# Patient Record
Sex: FEMALE | Race: WHITE | NOT HISPANIC OR LATINO | Employment: UNEMPLOYED | URBAN - METROPOLITAN AREA
[De-identification: names, ages, dates, MRNs, and addresses within clinical notes are randomized per-mention and may not be internally consistent; named-entity substitution may affect disease eponyms.]

---

## 2020-12-10 ENCOUNTER — TELEPHONE (OUTPATIENT)
Dept: OBSTETRICS AND GYNECOLOGY | Facility: CLINIC | Age: 40
End: 2020-12-10

## 2020-12-10 DIAGNOSIS — R10.32 ABDOMINAL PAIN, LLQ: Primary | ICD-10-CM

## 2020-12-10 NOTE — TELEPHONE ENCOUNTER
----- Message from Crista Robison sent at 12/10/2020  4:24 PM CST -----  Contact: pt  Type:  Sooner Apoointment Request    Caller is requesting a sooner appointment.  Caller declined first available appointment listed below.  Caller will not accept being placed on the waitlist and is requesting a message be sent to doctor.  Name of Caller:Margareth  When is the first available appointment?  Symptoms:pelvic pain  Would the patient rather a call back or a response via [facility]? call  Best Call Back Number:[phone]  Additional Information:

## 2020-12-11 ENCOUNTER — OFFICE VISIT (OUTPATIENT)
Dept: OBSTETRICS AND GYNECOLOGY | Facility: CLINIC | Age: 40
End: 2020-12-11
Payer: COMMERCIAL

## 2020-12-11 VITALS
BODY MASS INDEX: 23.56 KG/M2 | DIASTOLIC BLOOD PRESSURE: 60 MMHG | HEART RATE: 60 BPM | WEIGHT: 138 LBS | HEIGHT: 64 IN | SYSTOLIC BLOOD PRESSURE: 110 MMHG

## 2020-12-11 DIAGNOSIS — R10.31 ABDOMINAL PAIN, RLQ: ICD-10-CM

## 2020-12-11 DIAGNOSIS — N95.1 MENOPAUSAL SYMPTOMS: ICD-10-CM

## 2020-12-11 DIAGNOSIS — Z00.00 LABORATORY EXAM ORDERED AS PART OF ROUTINE GENERAL MEDICAL EXAMINATION: ICD-10-CM

## 2020-12-11 DIAGNOSIS — R39.89 BLADDER PAIN: Primary | ICD-10-CM

## 2020-12-11 LAB
AMORPH URATE CRY URNS QL MICRO: ABNORMAL
BACTERIA #/AREA URNS HPF: ABNORMAL /[HPF]
BILIRUB UR QL STRIP: NEGATIVE
CLARITY UR: CLEAR
COLOR UR: YELLOW
EPITHELIAL CELLS: ABNORMAL
GLUCOSE (UA): NEGATIVE MG/DL
KETONES UR QL STRIP: NEGATIVE MG/DL
LEUKOCYTE ESTERASE UR QL STRIP: NEGATIVE
MUCOUS THREADS URNS QL MICRO: NEGATIVE
NITRITE UR QL STRIP: NEGATIVE
OCCULT BLOOD: NEGATIVE
PH, URINE: 8 (ref 5–7.5)
PROT UR QL STRIP: NEGATIVE MG/DL
RBC/HPF: NEGATIVE
SP GR UR STRIP: 1.01 (ref 1–1.03)
UROBILINOGEN, URINE: NORMAL E.U./DL (ref 0–1)
WBC/HPF: NEGATIVE

## 2020-12-11 PROCEDURE — 99214 PR OFFICE/OUTPT VISIT, EST, LEVL IV, 30-39 MIN: ICD-10-PCS | Mod: 25,S$GLB,, | Performed by: OBSTETRICS & GYNECOLOGY

## 2020-12-11 PROCEDURE — 81002 PR URINALYSIS NONAUTO W/O SCOPE: ICD-10-PCS | Mod: S$GLB,,, | Performed by: OBSTETRICS & GYNECOLOGY

## 2020-12-11 PROCEDURE — 1125F PR PAIN SEVERITY QUANTIFIED, PAIN PRESENT: ICD-10-PCS | Mod: S$GLB,,, | Performed by: OBSTETRICS & GYNECOLOGY

## 2020-12-11 PROCEDURE — 81002 URINALYSIS NONAUTO W/O SCOPE: CPT | Mod: S$GLB,,, | Performed by: OBSTETRICS & GYNECOLOGY

## 2020-12-11 PROCEDURE — 3008F PR BODY MASS INDEX (BMI) DOCUMENTED: ICD-10-PCS | Mod: CPTII,S$GLB,, | Performed by: OBSTETRICS & GYNECOLOGY

## 2020-12-11 PROCEDURE — 3008F BODY MASS INDEX DOCD: CPT | Mod: CPTII,S$GLB,, | Performed by: OBSTETRICS & GYNECOLOGY

## 2020-12-11 PROCEDURE — 99214 OFFICE O/P EST MOD 30 MIN: CPT | Mod: 25,S$GLB,, | Performed by: OBSTETRICS & GYNECOLOGY

## 2020-12-11 PROCEDURE — 1125F AMNT PAIN NOTED PAIN PRSNT: CPT | Mod: S$GLB,,, | Performed by: OBSTETRICS & GYNECOLOGY

## 2020-12-11 RX ORDER — ALPRAZOLAM 0.5 MG/1
TABLET ORAL
COMMUNITY
Start: 2020-12-09 | End: 2022-02-22

## 2020-12-11 RX ORDER — CYCLOBENZAPRINE HCL 10 MG
TABLET ORAL
COMMUNITY
Start: 2020-10-30 | End: 2022-02-22

## 2020-12-11 RX ORDER — DULOXETIN HYDROCHLORIDE 60 MG/1
CAPSULE, DELAYED RELEASE ORAL
COMMUNITY
Start: 2020-10-30 | End: 2022-02-22

## 2020-12-11 RX ORDER — DOCUSATE SODIUM 100 MG/1
CAPSULE, LIQUID FILLED ORAL
COMMUNITY
End: 2022-02-22

## 2020-12-11 RX ORDER — HYDROCODONE BITARTRATE AND ACETAMINOPHEN 5; 325 MG/1; MG/1
1 TABLET ORAL EVERY 6 HOURS PRN
Qty: 20 TABLET | Refills: 0 | Status: SHIPPED | OUTPATIENT
Start: 2020-12-11 | End: 2022-02-22

## 2020-12-11 RX ORDER — NABUMETONE 500 MG/1
TABLET, FILM COATED ORAL
Qty: 30 TABLET | Refills: 2 | Status: SHIPPED | OUTPATIENT
Start: 2020-12-11 | End: 2022-02-22

## 2020-12-11 RX ORDER — ZOLPIDEM TARTRATE 5 MG/1
TABLET ORAL
COMMUNITY
Start: 2020-10-30 | End: 2022-02-22
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
## 2020-12-13 NOTE — PROGRESS NOTES
Please notify pt that her labwork only shows vaginal contamination but not evidence of bladder infection. Is she feeling any better with the Relafen and pain medication?

## 2020-12-14 ENCOUNTER — TELEPHONE (OUTPATIENT)
Dept: OBSTETRICS AND GYNECOLOGY | Facility: CLINIC | Age: 40
End: 2020-12-14

## 2020-12-14 DIAGNOSIS — R10.31 ABDOMINAL PAIN, RLQ: Primary | ICD-10-CM

## 2020-12-14 LAB
ABS NRBC COUNT: 0 X 10 3/UL (ref 0–0.01)
ABSOLUTE BASOPHIL: 0.05 X 10 3/UL (ref 0–0.22)
ABSOLUTE EOSINOPHIL: 0.09 X 10 3/UL (ref 0.04–0.54)
ABSOLUTE IMMATURE GRAN: 0.01 X 10 3/UL (ref 0–0.04)
ABSOLUTE LYMPHOCYTE: 1.07 X 10 3/UL (ref 0.86–4.75)
ABSOLUTE MONOCYTE: 0.39 X 10 3/UL (ref 0.22–1.08)
ALBUMIN SERPL-MCNC: 4.7 G/DL (ref 3.5–5.2)
ALBUMIN/GLOB SERPL ELPH: 2.2 {RATIO} (ref 1–2.7)
ALP ISOS SERPL LEV INH-CCNC: 26 U/L (ref 35–105)
ALT (SGPT): 14 U/L (ref 0–33)
ANION GAP SERPL CALC-SCNC: 8 MMOL/L (ref 8–17)
AST SERPL-CCNC: 19 U/L (ref 0–32)
BASOPHILS NFR BLD: 1 % (ref 0.2–1.2)
BILIRUBIN, TOTAL: 0.72 MG/DL (ref 0–1.2)
BUN/CREAT SERPL: 20.5 (ref 6–20)
CALCIUM SERPL-MCNC: 9 MG/DL (ref 8.6–10.2)
CARBON DIOXIDE, CO2: 26 MMOL/L (ref 22–29)
CHLORIDE: 107 MMOL/L (ref 98–107)
CHOLEST SERPL-MSCNC: 135 MG/DL (ref 100–200)
CREAT SERPL-MCNC: 0.79 MG/DL (ref 0.5–0.9)
EOSINOPHIL NFR BLD: 1.8 % (ref 0.7–7)
FSH: 3.77 MIU/ML
GFR ESTIMATION: 80.61
GLOBULIN: 2.1 G/DL (ref 1.5–4.5)
GLUCOSE: 83 MG/DL (ref 74–106)
HCT VFR BLD AUTO: 39.2 % (ref 37–47)
HDLC SERPL-MCNC: 63 MG/DL
HGB BLD-MCNC: 13 G/DL (ref 12–16)
IMMATURE GRANULOCYTES: 0.2 % (ref 0–0.5)
LDL/HDL RATIO: 1 (ref 1–3)
LDLC SERPL CALC-MCNC: 61.2 MG/DL (ref 0–100)
LH: 2.39 MIU/ML
LYMPHOCYTES NFR BLD: 21.4 % (ref 19.3–53.1)
MCH RBC QN AUTO: 32.1 PG (ref 27–32)
MCHC RBC AUTO-ENTMCNC: 33.2 G/DL (ref 32–36)
MCV RBC AUTO: 96.8 FL (ref 82–100)
MONOCYTES NFR BLD: 7.8 % (ref 4.7–12.5)
NEUTROPHILS # BLD AUTO: 3.4 X 10 3/UL (ref 2.15–7.56)
NEUTROPHILS NFR BLD: 67.8 %
NUCLEATED RED BLOOD CELLS: 0 /100 WBC (ref 0–0.2)
PLATELET # BLD AUTO: 243 X 10 3/UL (ref 135–400)
POTASSIUM: 4.4 MMOL/L (ref 3.5–5.1)
PROT SNV-MCNC: 6.8 G/DL (ref 6.4–8.3)
RBC # BLD AUTO: 4.05 X 10 6/UL (ref 4.2–5.4)
RDW-SD: 42.3 FL (ref 37–54)
SODIUM: 141 MMOL/L (ref 136–145)
T4, FREE: 1.28 NG/DL (ref 0.93–1.7)
TRIGL SERPL-MCNC: 54 MG/DL (ref 0–150)
TSH SERPL DL<=0.005 MIU/L-ACNC: 1.65 UIU/ML (ref 0.27–4.2)
UREA NITROGEN (BUN): 16.2 MG/DL (ref 6–20)
WBC # BLD: 5.01 X 10 3/UL (ref 4.3–10.8)

## 2020-12-14 NOTE — TELEPHONE ENCOUNTER
----- Message from Michelle Torres sent at 12/14/2020  2:51 PM CST -----  Contact: self  Requesting call back regarding pt states she's still in pain and hurting and wants to know if she can get an ultrasound. Also pt states the hydrocodone keeps her up at night and she cant rest. Please call back at [phone].

## 2020-12-15 ENCOUNTER — TELEPHONE (OUTPATIENT)
Dept: OBSTETRICS AND GYNECOLOGY | Facility: CLINIC | Age: 40
End: 2020-12-15

## 2020-12-15 ENCOUNTER — PROCEDURE VISIT (OUTPATIENT)
Dept: OBSTETRICS AND GYNECOLOGY | Facility: CLINIC | Age: 40
End: 2020-12-15
Payer: COMMERCIAL

## 2020-12-15 DIAGNOSIS — R10.32 ABDOMINAL PAIN, LLQ: ICD-10-CM

## 2020-12-15 PROCEDURE — 76830 PR  ECHOGRAPHY,TRANSVAGINAL: ICD-10-PCS | Mod: S$GLB,,, | Performed by: OBSTETRICS & GYNECOLOGY

## 2020-12-15 PROCEDURE — 76830 TRANSVAGINAL US NON-OB: CPT | Mod: S$GLB,,, | Performed by: OBSTETRICS & GYNECOLOGY

## 2020-12-15 NOTE — TELEPHONE ENCOUNTER
----- Message from Elsie Garvey MD sent at 12/14/2020  6:14 PM CST -----  Please notify patient of normal lab results. Hormone levels do not reflect menopause.

## 2020-12-15 NOTE — TELEPHONE ENCOUNTER
Spoke with patient. Two pt identifiers confirmed. Notified patient of results. Patient verbalized understanding.     Pt is asking for something different to be sent out for pain since the Norco keeps her up at night. Pt is aware that Dr Garvey is out of the office today and we'll call her back tomorrow regarding this.    ROSAS Corona

## 2020-12-16 DIAGNOSIS — R10.31 ABDOMINAL PAIN, RLQ: Primary | ICD-10-CM

## 2020-12-16 RX ORDER — TRAMADOL HYDROCHLORIDE 50 MG/1
50 TABLET ORAL EVERY 6 HOURS PRN
Qty: 20 TABLET | Refills: 0 | Status: SHIPPED | OUTPATIENT
Start: 2020-12-16 | End: 2021-12-16

## 2020-12-17 ENCOUNTER — TELEPHONE (OUTPATIENT)
Dept: OBSTETRICS AND GYNECOLOGY | Facility: CLINIC | Age: 40
End: 2020-12-17

## 2020-12-17 NOTE — TELEPHONE ENCOUNTER
----- Message from Elsie Garvey MD sent at 12/16/2020  6:05 PM CST -----  Please notify patient of normal ultrasound result and prescription sent

## 2020-12-22 ENCOUNTER — OFFICE VISIT (OUTPATIENT)
Dept: UROLOGY | Facility: CLINIC | Age: 40
End: 2020-12-22
Payer: COMMERCIAL

## 2020-12-22 VITALS
BODY MASS INDEX: 23.56 KG/M2 | RESPIRATION RATE: 18 BRPM | SYSTOLIC BLOOD PRESSURE: 130 MMHG | HEART RATE: 78 BPM | WEIGHT: 138 LBS | DIASTOLIC BLOOD PRESSURE: 76 MMHG | HEIGHT: 64 IN

## 2020-12-22 DIAGNOSIS — R35.0 URINARY FREQUENCY: ICD-10-CM

## 2020-12-22 DIAGNOSIS — R39.15 URINARY URGENCY: ICD-10-CM

## 2020-12-22 DIAGNOSIS — R39.89 BLADDER PAIN: Primary | ICD-10-CM

## 2020-12-22 LAB
CALCIUM BLD-MCNC: POSITIVE MG/DL
COV PREGNANCY STATUS: NORMAL
COVID-19 AB, IGM: NEGATIVE
PATIENT EMPLOYED IN HEALTHCARE: NO
PATIENT HAS SYMPTOMS FOR CONDITION OF INTEREST: NO
PATIENT HOSPITALIZED BC COND: NO
PATIENT IN CONGREGATE CARE: NO

## 2020-12-22 PROCEDURE — 3008F BODY MASS INDEX DOCD: CPT | Mod: CPTII,S$GLB,, | Performed by: NURSE PRACTITIONER

## 2020-12-22 PROCEDURE — 3008F PR BODY MASS INDEX (BMI) DOCUMENTED: ICD-10-PCS | Mod: CPTII,S$GLB,, | Performed by: NURSE PRACTITIONER

## 2020-12-22 PROCEDURE — 99204 PR OFFICE/OUTPT VISIT, NEW, LEVL IV, 45-59 MIN: ICD-10-PCS | Mod: S$GLB,,, | Performed by: NURSE PRACTITIONER

## 2020-12-22 PROCEDURE — 99204 OFFICE O/P NEW MOD 45 MIN: CPT | Mod: S$GLB,,, | Performed by: NURSE PRACTITIONER

## 2020-12-22 NOTE — PROGRESS NOTES
Subjective:       Patient ID: Margareth Echeverria is a 40 y.o. female.    Chief Complaint: Bladder Pain (New pt to est for constant bladder pain)      HPI: 40-year-old female, new patient, referred for bladder pain.  Patient states for approximately 1 month she has been having bladder pain/pressure and pelvic pain/pressure.  Patient denies pain or burning urination.  However patient states she does have urinary frequency and urgency.  States at times he will have decreased output.  Patient states her pain can be severe at times.  Patient states he will have pain with intercourse.  Patient states pain wakes her up at night.  Patient does have occasional leakage with cough or sneezing.  Patient has undergone a CT of the abdomen and pelvis which showed no acute abdominal abnormality.  Patient is ready had a pelvic exam which a she states is normal.  Patient also states she had a EGD which was normal.  Patient does have a history of irritable bowel.  She also has a history of endometriosis.  Patient denies any changes with food or stress.  No other urinary complaints.  All other health problems appear stable at this time.       Past Medical History:   Past Medical History:   Diagnosis Date    AV block     Fibromyalgia     IBS (irritable bowel syndrome)     Trigeminal neuralgia        Past Surgical Historical:   Past Surgical History:   Procedure Laterality Date    APPENDECTOMY      AUGMENTATION OF BREAST      x 2    CHOLECYSTECTOMY      FULGURATION OF ENDOMETRIOSIS  07/05/2016    chromopertubation    FULGURATION OF ENDOMETRIOSIS  10/05/2018    right ovarian cyst drainage and bilateral salpingectomy    INGUINAL HERNIA REPAIR Bilateral     LAPAROSCOPIC CHOLECYSTECTOMY      LAPAROSCOPY  12/08/2019    with exam under anesthesia    LAPAROSCOPY-ASSISTED VAGINAL HYSTERECTOMY  11/11/2019    with right oophorectomy        Medications:   Medication List with Changes/Refills   Current Medications    ALPRAZOLAM (XANAX)  0.5 MG TABLET        CYCLOBENZAPRINE (FLEXERIL) 10 MG TABLET    TK 1 T PO  TID    DOCUSATE SODIUM (COLACE) 100 MG CAPSULE    Stool Softener 100 mg capsule   TK 1 C PO Q 12 H    DULOXETINE (CYMBALTA) 60 MG CAPSULE    TK ONE C PO  BID    HYDROCODONE-ACETAMINOPHEN (NORCO) 5-325 MG PER TABLET    Take 1 tablet by mouth every 6 (six) hours as needed for Pain.    METHEN-M.BLUE-S.PHOS-PHSAL-HYO (URIBEL) 118-10-40.8-36 MG CAP    Take 1 capsule by mouth every 6 (six) hours as needed.    NABUMETONE (RELAFEN) 500 MG TABLET    Take 2 tablets by mouth and then in 8 hours start 1 tablet twice a day    TRAMADOL (ULTRAM) 50 MG TABLET    Take 1 tablet (50 mg total) by mouth every 6 (six) hours as needed for Pain.    ZOLPIDEM (AMBIEN) 5 MG TAB    TK 1 T PO  HS        Past Social History:   Social History     Socioeconomic History    Marital status:      Spouse name: Not on file    Number of children: Not on file    Years of education: Not on file    Highest education level: Not on file   Occupational History    Not on file   Social Needs    Financial resource strain: Not on file    Food insecurity     Worry: Not on file     Inability: Not on file    Transportation needs     Medical: Not on file     Non-medical: Not on file   Tobacco Use    Smoking status: Former Smoker     Types: Cigarettes     Quit date: 2011     Years since quittin.0    Smokeless tobacco: Never Used   Substance and Sexual Activity    Alcohol use: Yes     Frequency: 2-4 times a month    Drug use: Never    Sexual activity: Yes     Partners: Male     Birth control/protection: See Surgical Hx     Comment: Hyst   Lifestyle    Physical activity     Days per week: Not on file     Minutes per session: Not on file    Stress: Not on file   Relationships    Social connections     Talks on phone: Not on file     Gets together: Not on file     Attends Shinto service: Not on file     Active member of club or organization: Not on file      "Attends meetings of clubs or organizations: Not on file     Relationship status: Not on file   Other Topics Concern    Not on file   Social History Narrative    Not on file       Allergies:   Review of patient's allergies indicates:   Allergen Reactions    Tramadol Other (See Comments)     C/o "aggitated and edgy" with this med.        Family History:   Family History   Problem Relation Age of Onset    Lung cancer Paternal Grandmother     No Known Problems Father     No Known Problems Mother     Breast cancer Maternal Cousin         Review of Systems:  Review of Systems   Constitutional: Negative for activity change and appetite change.   HENT: Negative for congestion and dental problem.    Respiratory: Negative for chest tightness and shortness of breath.    Cardiovascular: Negative for chest pain.   Gastrointestinal: Negative for abdominal distention and abdominal pain.   Genitourinary: Positive for decreased urine volume, frequency, pelvic pain and urgency. Negative for difficulty urinating, dyspareunia, dysuria, enuresis, flank pain, genital sores and hematuria.   Musculoskeletal: Negative for back pain and neck pain.   Allergic/Immunologic: Negative for immunocompromised state.   Neurological: Negative for dizziness.   Hematological: Negative for adenopathy.   Psychiatric/Behavioral: Negative for agitation, behavioral problems and confusion.       Physical Exam:  Physical Exam  Vitals signs and nursing note reviewed.   Constitutional:       Appearance: She is well-developed.   HENT:      Head: Normocephalic.   Eyes:      Pupils: Pupils are equal, round, and reactive to light.   Neck:      Musculoskeletal: Normal range of motion and neck supple.   Cardiovascular:      Rate and Rhythm: Normal rate and regular rhythm.      Heart sounds: Normal heart sounds.   Pulmonary:      Effort: Pulmonary effort is normal.      Breath sounds: Normal breath sounds.   Abdominal:      General: Bowel sounds are normal.      " Palpations: Abdomen is soft.   Musculoskeletal: Normal range of motion.   Skin:     General: Skin is warm and dry.   Neurological:      Mental Status: She is alert and oriented to person, place, and time.   Psychiatric:         Behavior: Behavior normal.       Urinalysis:  Trace ketones, otherwise normal.    Assessment/Plan:   Bladder pain/frequency/urgency:  Patient very well may have interstitial cystitis.  Will provide patient with IC diet.  Will schedule patient for a cysto with hydrodistention and biopsy.    Follow-up to be arranged.  Problem List Items Addressed This Visit     None      Visit Diagnoses     Bladder pain    -  Primary    Relevant Orders    POCT Urinalysis (w/Micro Option)    Urinary frequency        Urinary urgency

## 2020-12-22 NOTE — PROGRESS NOTES
Patient seen chart reviewed case discussed with Alexander.  Patient sounds like she has classic IC.  Will start on IC diet and schedule cysto hydrodistension with biopsy

## 2020-12-28 ENCOUNTER — OUTSIDE PLACE OF SERVICE (OUTPATIENT)
Dept: UROLOGY | Facility: CLINIC | Age: 40
End: 2020-12-28
Payer: COMMERCIAL

## 2020-12-28 PROCEDURE — 52204 PR CYSTOURETHROSCOPY,BIOPSY: ICD-10-PCS | Mod: ,,, | Performed by: UROLOGY

## 2020-12-28 PROCEDURE — 52204 CYSTOSCOPY W/BIOPSY(S): CPT | Mod: ,,, | Performed by: UROLOGY

## 2020-12-30 ENCOUNTER — TELEPHONE (OUTPATIENT)
Dept: UROLOGY | Facility: CLINIC | Age: 40
End: 2020-12-30

## 2020-12-31 LAB — SPECIMEN TO PATHOLOGY: NORMAL

## 2021-01-05 ENCOUNTER — OFFICE VISIT (OUTPATIENT)
Dept: UROLOGY | Facility: CLINIC | Age: 41
End: 2021-01-05
Payer: COMMERCIAL

## 2021-01-05 VITALS — SYSTOLIC BLOOD PRESSURE: 131 MMHG | RESPIRATION RATE: 16 BRPM | DIASTOLIC BLOOD PRESSURE: 78 MMHG

## 2021-01-05 DIAGNOSIS — N30.10 INTERSTITIAL CYSTITIS: Primary | ICD-10-CM

## 2021-01-05 PROCEDURE — 99213 PR OFFICE/OUTPT VISIT, EST, LEVL III, 20-29 MIN: ICD-10-PCS | Mod: S$GLB,,, | Performed by: NURSE PRACTITIONER

## 2021-01-05 PROCEDURE — 99213 OFFICE O/P EST LOW 20 MIN: CPT | Mod: S$GLB,,, | Performed by: NURSE PRACTITIONER

## 2021-01-08 ENCOUNTER — TELEPHONE (OUTPATIENT)
Dept: UROLOGY | Facility: CLINIC | Age: 41
End: 2021-01-08

## 2021-05-12 ENCOUNTER — PATIENT MESSAGE (OUTPATIENT)
Dept: RESEARCH | Facility: HOSPITAL | Age: 41
End: 2021-05-12

## 2021-09-28 ENCOUNTER — OFFICE VISIT (OUTPATIENT)
Dept: OBSTETRICS AND GYNECOLOGY | Facility: CLINIC | Age: 41
End: 2021-09-28
Payer: COMMERCIAL

## 2021-09-28 VITALS
HEIGHT: 64 IN | HEART RATE: 92 BPM | SYSTOLIC BLOOD PRESSURE: 100 MMHG | DIASTOLIC BLOOD PRESSURE: 81 MMHG | WEIGHT: 135 LBS | BODY MASS INDEX: 23.05 KG/M2

## 2021-09-28 DIAGNOSIS — N60.02 BILATERAL BREAST CYSTS: Primary | ICD-10-CM

## 2021-09-28 DIAGNOSIS — N60.01 BILATERAL BREAST CYSTS: Primary | ICD-10-CM

## 2021-09-28 PROCEDURE — 3079F DIAST BP 80-89 MM HG: CPT | Mod: CPTII,S$GLB,, | Performed by: OBSTETRICS & GYNECOLOGY

## 2021-09-28 PROCEDURE — 99213 PR OFFICE/OUTPT VISIT, EST, LEVL III, 20-29 MIN: ICD-10-PCS | Mod: S$GLB,,, | Performed by: OBSTETRICS & GYNECOLOGY

## 2021-09-28 PROCEDURE — 3074F SYST BP LT 130 MM HG: CPT | Mod: CPTII,S$GLB,, | Performed by: OBSTETRICS & GYNECOLOGY

## 2021-09-28 PROCEDURE — 1159F MED LIST DOCD IN RCRD: CPT | Mod: CPTII,S$GLB,, | Performed by: OBSTETRICS & GYNECOLOGY

## 2021-09-28 PROCEDURE — 3079F PR MOST RECENT DIASTOLIC BLOOD PRESSURE 80-89 MM HG: ICD-10-PCS | Mod: CPTII,S$GLB,, | Performed by: OBSTETRICS & GYNECOLOGY

## 2021-09-28 PROCEDURE — 3074F PR MOST RECENT SYSTOLIC BLOOD PRESSURE < 130 MM HG: ICD-10-PCS | Mod: CPTII,S$GLB,, | Performed by: OBSTETRICS & GYNECOLOGY

## 2021-09-28 PROCEDURE — 3008F BODY MASS INDEX DOCD: CPT | Mod: CPTII,S$GLB,, | Performed by: OBSTETRICS & GYNECOLOGY

## 2021-09-28 PROCEDURE — 1159F PR MEDICATION LIST DOCUMENTED IN MEDICAL RECORD: ICD-10-PCS | Mod: CPTII,S$GLB,, | Performed by: OBSTETRICS & GYNECOLOGY

## 2021-09-28 PROCEDURE — 3008F PR BODY MASS INDEX (BMI) DOCUMENTED: ICD-10-PCS | Mod: CPTII,S$GLB,, | Performed by: OBSTETRICS & GYNECOLOGY

## 2021-09-28 PROCEDURE — 99213 OFFICE O/P EST LOW 20 MIN: CPT | Mod: S$GLB,,, | Performed by: OBSTETRICS & GYNECOLOGY

## 2021-09-28 RX ORDER — DULOXETIN HYDROCHLORIDE 30 MG/1
CAPSULE, DELAYED RELEASE ORAL
COMMUNITY
Start: 2021-07-01 | End: 2022-08-24

## 2021-09-28 RX ORDER — HYDROCODONE BITARTRATE AND ACETAMINOPHEN 10; 325 MG/1; MG/1
TABLET ORAL
COMMUNITY
Start: 2021-09-27 | End: 2022-03-03

## 2021-09-28 RX ORDER — CELECOXIB 200 MG/1
CAPSULE ORAL
COMMUNITY
Start: 2021-09-27 | End: 2022-02-22

## 2021-09-28 RX ORDER — NALDEMEDINE 0.2 MG/1
1 TABLET ORAL DAILY
COMMUNITY
Start: 2021-08-30 | End: 2022-02-22

## 2021-09-28 RX ORDER — CARIPRAZINE 1.5 MG/1
CAPSULE, GELATIN COATED ORAL
COMMUNITY
Start: 2021-08-30 | End: 2022-02-22

## 2022-02-22 ENCOUNTER — TELEPHONE (OUTPATIENT)
Dept: OBSTETRICS AND GYNECOLOGY | Facility: CLINIC | Age: 42
End: 2022-02-22

## 2022-02-22 ENCOUNTER — PROCEDURE VISIT (OUTPATIENT)
Dept: OBSTETRICS AND GYNECOLOGY | Facility: CLINIC | Age: 42
End: 2022-02-22
Payer: COMMERCIAL

## 2022-02-22 ENCOUNTER — OFFICE VISIT (OUTPATIENT)
Dept: OBSTETRICS AND GYNECOLOGY | Facility: CLINIC | Age: 42
End: 2022-02-22
Payer: COMMERCIAL

## 2022-02-22 VITALS
BODY MASS INDEX: 23.56 KG/M2 | SYSTOLIC BLOOD PRESSURE: 115 MMHG | DIASTOLIC BLOOD PRESSURE: 75 MMHG | HEIGHT: 64 IN | HEART RATE: 83 BPM | WEIGHT: 138 LBS

## 2022-02-22 DIAGNOSIS — R10.2 PELVIC PAIN: ICD-10-CM

## 2022-02-22 DIAGNOSIS — R39.89 BLADDER PAIN: ICD-10-CM

## 2022-02-22 DIAGNOSIS — Z12.31 SCREENING MAMMOGRAM, ENCOUNTER FOR: ICD-10-CM

## 2022-02-22 DIAGNOSIS — Z01.419 WELL WOMAN EXAM WITH ROUTINE GYNECOLOGICAL EXAM: Primary | ICD-10-CM

## 2022-02-22 DIAGNOSIS — N95.1 MENOPAUSAL SYMPTOMS: ICD-10-CM

## 2022-02-22 LAB
AMORPH URATE CRY URNS QL MICRO: NEGATIVE
BACTERIA #/AREA URNS HPF: ABNORMAL /[HPF]
BILIRUB UR QL STRIP: NEGATIVE
CLARITY UR: ABNORMAL
COLOR UR: YELLOW
E2: 139 PG/ML
EPITHELIAL CELLS: ABNORMAL
FREE TESTOSTERONE: 0.08 NG/DL (ref 0–1)
GLUCOSE (UA): NEGATIVE MG/DL
KETONES UR QL STRIP: ABNORMAL MG/DL
LEUKOCYTE ESTERASE UR QL STRIP: ABNORMAL
MUCOUS THREADS URNS QL MICRO: ABNORMAL
NITRITE UR QL STRIP: NEGATIVE
OCCULT BLOOD: NEGATIVE
PH, URINE: 7 (ref 5–7.5)
PROT UR QL STRIP: NEGATIVE MG/DL
RBC/HPF: ABNORMAL
SP GR UR STRIP: 1.01 (ref 1–1.03)
TSH SERPL DL<=0.005 MIU/L-ACNC: 1.1 UIU/ML (ref 0.27–4.2)
UROBILINOGEN, URINE: NORMAL E.U./DL (ref 0–1)
WBC/HPF: ABNORMAL

## 2022-02-22 PROCEDURE — 76830 TRANSVAGINAL US NON-OB: CPT | Mod: S$GLB,,, | Performed by: OBSTETRICS & GYNECOLOGY

## 2022-02-22 PROCEDURE — 1159F PR MEDICATION LIST DOCUMENTED IN MEDICAL RECORD: ICD-10-PCS | Mod: CPTII,S$GLB,, | Performed by: OBSTETRICS & GYNECOLOGY

## 2022-02-22 PROCEDURE — 3008F BODY MASS INDEX DOCD: CPT | Mod: CPTII,S$GLB,, | Performed by: OBSTETRICS & GYNECOLOGY

## 2022-02-22 PROCEDURE — 99396 PR PREVENTIVE VISIT,EST,40-64: ICD-10-PCS | Mod: 25,S$GLB,, | Performed by: OBSTETRICS & GYNECOLOGY

## 2022-02-22 PROCEDURE — 3008F PR BODY MASS INDEX (BMI) DOCUMENTED: ICD-10-PCS | Mod: CPTII,S$GLB,, | Performed by: OBSTETRICS & GYNECOLOGY

## 2022-02-22 PROCEDURE — 3074F PR MOST RECENT SYSTOLIC BLOOD PRESSURE < 130 MM HG: ICD-10-PCS | Mod: CPTII,S$GLB,, | Performed by: OBSTETRICS & GYNECOLOGY

## 2022-02-22 PROCEDURE — 3078F PR MOST RECENT DIASTOLIC BLOOD PRESSURE < 80 MM HG: ICD-10-PCS | Mod: CPTII,S$GLB,, | Performed by: OBSTETRICS & GYNECOLOGY

## 2022-02-22 PROCEDURE — 3078F DIAST BP <80 MM HG: CPT | Mod: CPTII,S$GLB,, | Performed by: OBSTETRICS & GYNECOLOGY

## 2022-02-22 PROCEDURE — 99396 PREV VISIT EST AGE 40-64: CPT | Mod: 25,S$GLB,, | Performed by: OBSTETRICS & GYNECOLOGY

## 2022-02-22 PROCEDURE — 3074F SYST BP LT 130 MM HG: CPT | Mod: CPTII,S$GLB,, | Performed by: OBSTETRICS & GYNECOLOGY

## 2022-02-22 PROCEDURE — 1159F MED LIST DOCD IN RCRD: CPT | Mod: CPTII,S$GLB,, | Performed by: OBSTETRICS & GYNECOLOGY

## 2022-02-22 PROCEDURE — 76830 US OB/GYN PROCEDURE (VIEWPOINT): ICD-10-PCS | Mod: S$GLB,,, | Performed by: OBSTETRICS & GYNECOLOGY

## 2022-02-22 RX ORDER — OMEPRAZOLE 40 MG/1
40 CAPSULE, DELAYED RELEASE ORAL DAILY
COMMUNITY
Start: 2021-11-23 | End: 2022-08-24

## 2022-02-22 RX ORDER — MECLIZINE HYDROCHLORIDE 25 MG/1
25 TABLET ORAL 3 TIMES DAILY PRN
COMMUNITY
Start: 2021-11-03 | End: 2022-08-24

## 2022-02-22 NOTE — PROGRESS NOTES
Margareth Echeverria is a 42 y.o. female  who presents for a well woman exam.  No issues, problems, or complaints.  She notes low libido, low abdominal pain with intercourse as well as vaginal dyspareunia.     Past Medical History:   Diagnosis Date    Adult hypophosphatasia     AV block     Fibromyalgia     IBS (irritable bowel syndrome)     IC (interstitial cystitis)     Menopausal symptoms     Trigeminal neuralgia     left side of face     Past Surgical History:   Procedure Laterality Date    APPENDECTOMY      AUGMENTATION OF BREAST      x 2    CYSTOSCOPY WITH HYDRODISTENSION AND BIOPSY OF BLADDER  2020    FULGURATION OF ENDOMETRIOSIS  2016    chromopertubation    INGUINAL HERNIA REPAIR Bilateral     LAPAROSCOPIC CHOLECYSTECTOMY      LAPAROSCOPY  2019    with exam under anesthesia    LAPAROSCOPY-ASSISTED VAGINAL HYSTERECTOMY  2019    with right oophorectomy    REMOVAL OF BREAST IMPLANT  2021     OB History    Para Term  AB Living   1 1       1   SAB IAB Ectopic Multiple Live Births                  # Outcome Date GA Lbr Jose Armando/2nd Weight Sex Delivery Anes PTL Lv   1 Para               Family History   Problem Relation Age of Onset    Lung cancer Paternal Grandmother     No Known Problems Father     No Known Problems Mother     Breast cancer Maternal Cousin     No Known Problems Sister     No Known Problems Brother     No Known Problems Maternal Grandmother     No Known Problems Maternal Grandfather     No Known Problems Paternal Grandfather      Social History     Tobacco Use    Smoking status: Former Smoker     Types: Cigarettes     Quit date: 2011     Years since quitting: 10.2    Smokeless tobacco: Never Used   Substance Use Topics    Alcohol use: Yes    Drug use: Yes     Types: Marijuana       Current Outpatient Medications:     DULoxetine (CYMBALTA) 30 MG capsule, , Disp: , Rfl:     HYDROcodone-acetaminophen (NORCO)  mg per  "tablet, , Disp: , Rfl:     meclizine (ANTIVERT) 25 mg tablet, Take 25 mg by mouth 3 (three) times daily as needed., Disp: , Rfl:     omeprazole (PRILOSEC) 40 MG capsule, Take 40 mg by mouth once daily., Disp: , Rfl:    Review of patient's allergies indicates:   Allergen Reactions    Tramadol Other (See Comments)     C/o "aggitated and edgy" with this med.        ROS:  GENERAL: Denies weight gain or weight loss. Feeling well overall.   SKIN: Denies rash or lesions.   HEAD: Denies head injury or headache.   NODES: Denies enlarged lymph nodes.   CHEST: Denies shortness of breath.   CARDIOVASCULAR: Denies abdominal pain, constipation, diarrhea, nausea, vomiting or rectal bleeding.   URINARY: Denies frequency, dysuria, hematuria, or burning on urination.  REPRODUCTIVE: See HPI.   BREASTS: Denies pain, lumps, or nipple discharge.   HEMATOLOGIC: Denies easy bruisability or excessive bleeding.   MUSCULOSKELETAL: Denies joint pain or swelling.   NEUROLOGIC: Denies syncope or weakness.   PSYCHIATRIC: Denies depression, anxiety or mood swings.    PHYSICAL EXAM:    /75   Pulse 83   Ht 5' 4" (1.626 m)   Wt 62.6 kg (138 lb)   BMI 23.69 kg/m²    Body mass index is 23.69 kg/m².     APPEARANCE: Well nourished, well developed, in no acute distress.  AFFECT: WNL, alert and oriented x 3  SKIN: No acne or hirsutism  NECK: Neck symmetric without masses or thyromegaly  NODES: No inguinal, cervical, axillary, or femoral lymph node enlargement  CHEST: Good respiratory effect  ABDOMEN: Soft.  No tenderness or masses.  No hepatosplenomegaly.  No hernias.  BREASTS: Symmetrical, no skin changes or visible lesions.  No palpable masses, nipple discharge bilaterally.  PELVIC: Normal external genitalia without lesions.  Normal hair distribution.  Adequate perineal body, normal urethral meatus.  Urethra and bladder without tenderness or masses. Vagina moist and well rugated without lesions or discharge.  No significant cystocele or " rectocele.  Bimanual exam shows adnexa without masses or tenderness.    EXTREMITIES: No edema.     Well woman exam with routine gynecological exam  -     Liquid-based pap smear, screening    Screening mammogram, encounter for  -     Mammo Digital Screening Bilat w/ Harry; Future    Menopausal symptoms  -     Estradiol; Future; Expected date: 03/01/2022  -     Testosterone, Free; Future; Expected date: 03/01/2022  -     TSH; Future; Expected date: 03/01/2022    Bladder pain  -     Urinalysis, Reflex to Urine Culture Urine, Clean Catch; Future; Expected date: 02/22/2022    Pelvic pain  -     US OB/GYN Procedure ([facility]); Future             Patient was counseled today on A.C.S. Pap guidelines and recommendations for yearly pelvic exams, mammograms and monthly self breast exams; to see her PCP for other health maintenance.     Follow up in 1 year

## 2022-02-23 ENCOUNTER — PATIENT MESSAGE (OUTPATIENT)
Dept: OBSTETRICS AND GYNECOLOGY | Facility: CLINIC | Age: 42
End: 2022-02-23
Payer: COMMERCIAL

## 2022-02-23 DIAGNOSIS — N95.1 MENOPAUSAL SYMPTOMS: Primary | ICD-10-CM

## 2022-02-23 RX ORDER — TESTOSTERONE
POWDER (GRAM) MISCELLANEOUS
Qty: 30 G | Refills: 5 | Status: SHIPPED | OUTPATIENT
Start: 2022-02-23 | End: 2022-08-23 | Stop reason: DRUGHIGH

## 2022-02-24 ENCOUNTER — PATIENT MESSAGE (OUTPATIENT)
Dept: OBSTETRICS AND GYNECOLOGY | Facility: CLINIC | Age: 42
End: 2022-02-24

## 2022-02-24 ENCOUNTER — TELEPHONE (OUTPATIENT)
Dept: OBSTETRICS AND GYNECOLOGY | Facility: CLINIC | Age: 42
End: 2022-02-24
Payer: COMMERCIAL

## 2022-02-24 ENCOUNTER — CLINICAL SUPPORT (OUTPATIENT)
Dept: OBSTETRICS AND GYNECOLOGY | Facility: CLINIC | Age: 42
End: 2022-02-24
Payer: COMMERCIAL

## 2022-02-24 DIAGNOSIS — R10.2 PELVIC PAIN: Primary | ICD-10-CM

## 2022-02-24 PROCEDURE — 96372 THER/PROPH/DIAG INJ SC/IM: CPT | Mod: S$GLB,,, | Performed by: OBSTETRICS & GYNECOLOGY

## 2022-02-24 PROCEDURE — 96372 PR INJECTION,THERAP/PROPH/DIAG2ST, IM OR SUBCUT: ICD-10-PCS | Mod: S$GLB,,, | Performed by: OBSTETRICS & GYNECOLOGY

## 2022-02-24 RX ORDER — KETOROLAC TROMETHAMINE 30 MG/ML
60 INJECTION, SOLUTION INTRAMUSCULAR; INTRAVENOUS
Status: DISCONTINUED | OUTPATIENT
Start: 2022-02-24 | End: 2022-02-24

## 2022-02-24 RX ORDER — KETOROLAC TROMETHAMINE 30 MG/ML
60 INJECTION, SOLUTION INTRAMUSCULAR; INTRAVENOUS
Status: COMPLETED | OUTPATIENT
Start: 2022-02-24 | End: 2022-02-24

## 2022-02-24 RX ADMIN — KETOROLAC TROMETHAMINE 60 MG: 30 INJECTION, SOLUTION INTRAMUSCULAR; INTRAVENOUS at 03:02

## 2022-03-02 ENCOUNTER — PATIENT MESSAGE (OUTPATIENT)
Dept: OBSTETRICS AND GYNECOLOGY | Facility: CLINIC | Age: 42
End: 2022-03-02
Payer: COMMERCIAL

## 2022-03-02 DIAGNOSIS — N83.202 LEFT OVARIAN CYST: Primary | ICD-10-CM

## 2022-03-03 ENCOUNTER — PATIENT MESSAGE (OUTPATIENT)
Dept: OBSTETRICS AND GYNECOLOGY | Facility: CLINIC | Age: 42
End: 2022-03-03
Payer: COMMERCIAL

## 2022-03-03 DIAGNOSIS — N83.202 LEFT OVARIAN CYST: Primary | ICD-10-CM

## 2022-03-03 RX ORDER — OXYCODONE AND ACETAMINOPHEN 5; 325 MG/1; MG/1
1 TABLET ORAL EVERY 4 HOURS PRN
Qty: 30 TABLET | Refills: 0 | Status: SHIPPED | OUTPATIENT
Start: 2022-03-03 | End: 2022-08-23

## 2022-03-07 ENCOUNTER — PROCEDURE VISIT (OUTPATIENT)
Dept: OBSTETRICS AND GYNECOLOGY | Facility: CLINIC | Age: 42
End: 2022-03-07
Payer: COMMERCIAL

## 2022-03-07 ENCOUNTER — OFFICE VISIT (OUTPATIENT)
Dept: OBSTETRICS AND GYNECOLOGY | Facility: CLINIC | Age: 42
End: 2022-03-07

## 2022-03-07 VITALS
WEIGHT: 137.38 LBS | SYSTOLIC BLOOD PRESSURE: 118 MMHG | HEART RATE: 81 BPM | BODY MASS INDEX: 23.58 KG/M2 | DIASTOLIC BLOOD PRESSURE: 72 MMHG

## 2022-03-07 DIAGNOSIS — N83.202 LEFT OVARIAN CYST: ICD-10-CM

## 2022-03-07 DIAGNOSIS — N83.202 LEFT OVARIAN CYST: Primary | ICD-10-CM

## 2022-03-07 PROCEDURE — 3078F DIAST BP <80 MM HG: CPT | Mod: CPTII,S$GLB,, | Performed by: OBSTETRICS & GYNECOLOGY

## 2022-03-07 PROCEDURE — 3074F SYST BP LT 130 MM HG: CPT | Mod: CPTII,S$GLB,, | Performed by: OBSTETRICS & GYNECOLOGY

## 2022-03-07 PROCEDURE — 1159F MED LIST DOCD IN RCRD: CPT | Mod: CPTII,S$GLB,, | Performed by: OBSTETRICS & GYNECOLOGY

## 2022-03-07 PROCEDURE — 3074F PR MOST RECENT SYSTOLIC BLOOD PRESSURE < 130 MM HG: ICD-10-PCS | Mod: CPTII,S$GLB,, | Performed by: OBSTETRICS & GYNECOLOGY

## 2022-03-07 PROCEDURE — 3078F PR MOST RECENT DIASTOLIC BLOOD PRESSURE < 80 MM HG: ICD-10-PCS | Mod: CPTII,S$GLB,, | Performed by: OBSTETRICS & GYNECOLOGY

## 2022-03-07 PROCEDURE — 1159F PR MEDICATION LIST DOCUMENTED IN MEDICAL RECORD: ICD-10-PCS | Mod: CPTII,S$GLB,, | Performed by: OBSTETRICS & GYNECOLOGY

## 2022-03-07 PROCEDURE — 3008F PR BODY MASS INDEX (BMI) DOCUMENTED: ICD-10-PCS | Mod: CPTII,S$GLB,, | Performed by: OBSTETRICS & GYNECOLOGY

## 2022-03-07 PROCEDURE — 76830 US OB/GYN PROCEDURE (VIEWPOINT): ICD-10-PCS | Mod: S$GLB,,, | Performed by: OBSTETRICS & GYNECOLOGY

## 2022-03-07 PROCEDURE — 99213 PR OFFICE/OUTPT VISIT, EST, LEVL III, 20-29 MIN: ICD-10-PCS | Mod: 25,S$GLB,, | Performed by: OBSTETRICS & GYNECOLOGY

## 2022-03-07 PROCEDURE — 99213 OFFICE O/P EST LOW 20 MIN: CPT | Mod: 25,S$GLB,, | Performed by: OBSTETRICS & GYNECOLOGY

## 2022-03-07 PROCEDURE — 76830 TRANSVAGINAL US NON-OB: CPT | Mod: S$GLB,,, | Performed by: OBSTETRICS & GYNECOLOGY

## 2022-03-07 PROCEDURE — 3008F BODY MASS INDEX DOCD: CPT | Mod: CPTII,S$GLB,, | Performed by: OBSTETRICS & GYNECOLOGY

## 2022-03-07 RX ORDER — DULOXETIN HYDROCHLORIDE 60 MG/1
CAPSULE, DELAYED RELEASE ORAL
COMMUNITY
End: 2022-08-23

## 2022-03-07 RX ORDER — HYDROCODONE BITARTRATE AND ACETAMINOPHEN 5; 325 MG/1; MG/1
TABLET ORAL
COMMUNITY
End: 2022-08-23

## 2022-03-07 NOTE — PROGRESS NOTES
Chief Complaint: follow up of left ovarian cyst     HPI:      Margareth Echeverria is a 42 y.o. female  with a left ovarian cyst who had pain at the end of last week but improved this weekend with rest. She still has some soreness and vaginal pressure.  No LMP recorded. Patient has had a hysterectomy.    Past Medical History:   Diagnosis Date    Adult hypophosphatasia     AV block     Fibromyalgia     IBS (irritable bowel syndrome)     IC (interstitial cystitis)     Menopausal symptoms     Trigeminal neuralgia     left side of face      Past Surgical History:   Procedure Laterality Date    APPENDECTOMY      AUGMENTATION OF BREAST      x 2    CYSTOSCOPY WITH HYDRODISTENSION AND BIOPSY OF BLADDER  2020    FULGURATION OF ENDOMETRIOSIS  2016    chromopertubation    INGUINAL HERNIA REPAIR Bilateral     LAPAROSCOPIC CHOLECYSTECTOMY      LAPAROSCOPY  2019    with exam under anesthesia    LAPAROSCOPY-ASSISTED VAGINAL HYSTERECTOMY  2019    with right oophorectomy    REMOVAL OF BREAST IMPLANT  2021      Social History     Tobacco Use    Smoking status: Former Smoker     Types: Cigarettes     Quit date: 2011     Years since quitting: 10.2    Smokeless tobacco: Never Used   Substance Use Topics    Alcohol use: Yes    Drug use: Yes     Types: Marijuana      Current Outpatient Medications on File Prior to Visit   Medication Sig Dispense Refill    DULoxetine (CYMBALTA) 30 MG capsule       DULoxetine (CYMBALTA) 60 MG capsule 1 cap(s)      meclizine (ANTIVERT) 25 mg tablet Take 25 mg by mouth 3 (three) times daily as needed.      omeprazole (PRILOSEC) 40 MG capsule Take 40 mg by mouth once daily.      oxyCODONE-acetaminophen (PERCOCET) 5-325 mg per tablet Take 1 tablet by mouth every 4 (four) hours as needed for Pain. 30 tablet 0    testosterone, bulk, Powd Apply 4 clicks 1 ml daily, please convert to compound 30 g 5    HYDROcodone-acetaminophen (NORCO) 5-325 mg per  "tablet 1 tab(s)       No current facility-administered medications on file prior to visit.      Review of patient's allergies indicates:   Allergen Reactions    Tramadol Other (See Comments)     C/o "aggitated and edgy" with this med.          ROS:     GENERAL: Denies weight gain or weight loss. Feeling well overall.   SKIN: Denies rash or lesions.   NODES: Denies enlarged lymph nodes.   CARDIOVASCULAR: Denies palpitations or chest pain.   ABDOMEN: Denies abdominal pain, constipation, diarrhea, nausea, vomiting or rectal bleeding.   URINARY: Denies frequency, dysuria, hematuria, or burning on urination.  REPRODUCTIVE: See HPI.   BREASTS: Denies pain, lumps, or nipple discharge.   HEMATOLOGIC: Denies easy bruisability or excessive bleeding with the exception of menstrual cycles.  PSYCHIATRIC: Denies depression, anxiety or mood swings.   Physical Exam:      /72   Pulse 81   Wt 62.3 kg (137 lb 6.4 oz)   BMI 23.58 kg/m²   Body mass index is 23.58 kg/m².     ABDOMEN: Soft.  No tenderness or masses. No hepatoslenomegaly. No hernias.   PELVIC: Normal external genitalia without lesions.  Normal hair distribution.  Adequate perineal body, normal urethral meatus.  Urethra and bladder without tenderness or masses. Vagina moist and well rugated without lesions or discharge.  Cervix pink, without lesions, discharge or tenderness.  No significant cystocele or rectocele.  Bimanual exam shows uterus to be normal size, regular, mobile and nontender.  Adnexa without masses or tenderness.        Assessment/Plan:     Left ovarian cyst  -     US OB/GYN Procedure ([facility]); Future        "

## 2022-03-09 ENCOUNTER — TELEPHONE (OUTPATIENT)
Dept: OBSTETRICS AND GYNECOLOGY | Facility: CLINIC | Age: 42
End: 2022-03-09
Payer: COMMERCIAL

## 2022-07-13 ENCOUNTER — PATIENT MESSAGE (OUTPATIENT)
Dept: OBSTETRICS AND GYNECOLOGY | Facility: CLINIC | Age: 42
End: 2022-07-13
Payer: COMMERCIAL

## 2022-07-13 DIAGNOSIS — N95.1 MENOPAUSAL SYMPTOMS: ICD-10-CM

## 2022-07-13 DIAGNOSIS — R53.83 FATIGUE, UNSPECIFIED TYPE: ICD-10-CM

## 2022-07-13 DIAGNOSIS — Z00.00 LABORATORY EXAM ORDERED AS PART OF ROUTINE GENERAL MEDICAL EXAMINATION: Primary | ICD-10-CM

## 2022-07-13 NOTE — TELEPHONE ENCOUNTER
Please give problem appt and orders sent to [facility] for fasting labs.

## 2022-07-15 ENCOUNTER — TELEPHONE (OUTPATIENT)
Dept: OBSTETRICS AND GYNECOLOGY | Facility: CLINIC | Age: 42
End: 2022-07-15
Payer: COMMERCIAL

## 2022-07-15 NOTE — TELEPHONE ENCOUNTER
Left message for pt after identifying herself via VM  That lab orders have been sent per her request and to please call and schedule an appt

## 2022-07-15 NOTE — TELEPHONE ENCOUNTER
----- Message from Jair Sykes sent at 7/14/2022  4:49 PM CDT -----  Contact: Patient  Type:  Patient Returning Call    Who Left Message for Patient:   OLGA          Call back #  for patient   [phone]

## 2022-08-17 LAB
ABS NRBC COUNT: 0 X 10 3/UL (ref 0–0.01)
ABSOLUTE BASOPHIL: 0.05 X 10 3/UL (ref 0–0.22)
ABSOLUTE EOSINOPHIL: 0.06 X 10 3/UL (ref 0.04–0.54)
ABSOLUTE IMMATURE GRAN: 0.01 X 10 3/UL (ref 0–0.04)
ABSOLUTE LYMPHOCYTE: 1.43 X 10 3/UL (ref 0.86–4.75)
ABSOLUTE MONOCYTE: 0.48 X 10 3/UL (ref 0.22–1.08)
ALBUMIN SERPL-MCNC: 5.2 G/DL (ref 3.5–5.2)
ALBUMIN/GLOB SERPL ELPH: 1.9 {RATIO} (ref 1–2.7)
ALP ISOS SERPL LEV INH-CCNC: 35 U/L (ref 35–105)
ALT (SGPT): 15 U/L (ref 0–33)
ANION GAP SERPL CALC-SCNC: 9 MMOL/L (ref 8–17)
AST SERPL-CCNC: 18 U/L (ref 0–32)
BASOPHILS NFR BLD: 0.9 % (ref 0.2–1.2)
BILIRUBIN, TOTAL: 0.71 MG/DL (ref 0–1.2)
BUN/CREAT SERPL: 20.6 (ref 6–20)
CALCIUM SERPL-MCNC: 10.3 MG/DL (ref 8.6–10.2)
CARBON DIOXIDE, CO2: 28 MMOL/L (ref 22–29)
CHLORIDE: 104 MMOL/L (ref 98–107)
CHOLEST SERPL-MSCNC: 149 MG/DL (ref 100–200)
CREAT SERPL-MCNC: 0.8 MG/DL (ref 0.5–0.9)
E2: 170 PG/ML
EOSINOPHIL NFR BLD: 1.1 % (ref 0.7–7)
FREE TESTOSTERONE: 0.14 NG/DL (ref 0–1)
GFR ESTIMATION: 78.66
GLOBULIN: 2.7 G/DL (ref 1.5–4.5)
GLUCOSE: 98 MG/DL (ref 74–106)
HCT VFR BLD AUTO: 43.2 % (ref 37–47)
HDLC SERPL-MCNC: 60 MG/DL
HGB BLD-MCNC: 15.4 G/DL (ref 12–16)
IMMATURE GRANULOCYTES: 0.2 % (ref 0–0.5)
LDL/HDL RATIO: 1.3 (ref 1–3)
LDLC SERPL CALC-MCNC: 76.2 MG/DL (ref 0–100)
LYMPHOCYTES NFR BLD: 25.5 % (ref 19.3–53.1)
MCH RBC QN AUTO: 32.3 PG (ref 27–32)
MCHC RBC AUTO-ENTMCNC: 35.6 G/DL (ref 32–36)
MCV RBC AUTO: 90.6 FL (ref 82–100)
MONOCYTES NFR BLD: 8.6 % (ref 4.7–12.5)
NEUTROPHILS # BLD AUTO: 3.58 X 10 3/UL (ref 2.15–7.56)
NEUTROPHILS NFR BLD: 63.7 % (ref 34–71.1)
NUCLEATED RED BLOOD CELLS: 0 /100 WBC (ref 0–0.2)
PLATELET # BLD AUTO: 297 X 10 3/UL (ref 135–400)
POTASSIUM: 5.4 MMOL/L (ref 3.5–5.1)
PROGEST SERPL-MCNC: 10.8 NG/ML
PROT SNV-MCNC: 7.9 G/DL (ref 6.4–8.3)
RBC # BLD AUTO: 4.77 X 10 6/UL (ref 4.2–5.4)
RDW-SD: 38.4 FL (ref 37–54)
SODIUM: 141 MMOL/L (ref 136–145)
T4, FREE: 1.14 NG/DL (ref 0.93–1.7)
TRIGL SERPL-MCNC: 64 MG/DL (ref 0–150)
TSH SERPL DL<=0.005 MIU/L-ACNC: 1.07 UIU/ML (ref 0.27–4.2)
UREA NITROGEN (BUN): 16.5 MG/DL (ref 6–20)
VITAMIN D (25OHD): 39.2 NG/ML
WBC # BLD: 5.61 X 10 3/UL (ref 4.3–10.8)

## 2022-08-19 ENCOUNTER — PATIENT MESSAGE (OUTPATIENT)
Dept: OBSTETRICS AND GYNECOLOGY | Facility: CLINIC | Age: 42
End: 2022-08-19
Payer: COMMERCIAL

## 2022-08-22 ENCOUNTER — TELEPHONE (OUTPATIENT)
Dept: OBSTETRICS AND GYNECOLOGY | Facility: CLINIC | Age: 42
End: 2022-08-22
Payer: COMMERCIAL

## 2022-08-22 NOTE — TELEPHONE ENCOUNTER
----- Message from Gabriella Buck sent at 8/19/2022  3:42 PM CDT -----  Regarding: Return Call  Type:  Patient Returning Call    Who Called:Margareth   Who Left Message for Patient: nurse   Does the patient know what this is regarding?: labs  Would the patient rather a call back or a response via [facility]? Call back  Best Call Back Number: [phone] (home)     Additional Information:      SYLVIA Hilliard

## 2022-08-23 ENCOUNTER — TELEPHONE (OUTPATIENT)
Dept: OBSTETRICS AND GYNECOLOGY | Facility: CLINIC | Age: 42
End: 2022-08-23
Payer: COMMERCIAL

## 2022-08-23 DIAGNOSIS — N95.1 MENOPAUSAL SYMPTOMS: Primary | ICD-10-CM

## 2022-08-23 RX ORDER — TESTOSTERONE
POWDER (GRAM) MISCELLANEOUS
Qty: 30 G | Refills: 5 | Status: SHIPPED | OUTPATIENT
Start: 2022-08-23 | End: 2022-08-24

## 2022-08-23 NOTE — TELEPHONE ENCOUNTER
----- Message from Gabriella Buck sent at 8/23/2022  3:52 PM CDT -----  Regarding: Result  Contact: patient  Type:  Test Results    Who Called: Margareth   Name of Test (Lab/Mammo/Etc):  lab   Date of Test:  last week   Ordering Provider:  Dr Garvey   Where the test [facility] performed[facility]   Would the patient rather a call back or a response via [facility]?  Call back   Best Call Back Number:  [phone] (home)     Additional Information:       Thanks,  SJ

## 2022-08-23 NOTE — TELEPHONE ENCOUNTER
Please notify pt that her testosterone level is low and that prescription for higher strength [facility]. Her estrogen and progesterone levels are good.

## 2022-12-05 ENCOUNTER — PATIENT MESSAGE (OUTPATIENT)
Dept: OBSTETRICS AND GYNECOLOGY | Facility: CLINIC | Age: 42
End: 2022-12-05
Payer: COMMERCIAL

## 2022-12-06 ENCOUNTER — PATIENT MESSAGE (OUTPATIENT)
Dept: OBSTETRICS AND GYNECOLOGY | Facility: CLINIC | Age: 42
End: 2022-12-06
Payer: COMMERCIAL

## 2022-12-07 ENCOUNTER — OFFICE VISIT (OUTPATIENT)
Dept: OBSTETRICS AND GYNECOLOGY | Facility: CLINIC | Age: 42
End: 2022-12-07
Payer: COMMERCIAL

## 2022-12-07 VITALS
SYSTOLIC BLOOD PRESSURE: 130 MMHG | WEIGHT: 122.63 LBS | DIASTOLIC BLOOD PRESSURE: 80 MMHG | HEART RATE: 80 BPM | BODY MASS INDEX: 20.93 KG/M2 | HEIGHT: 64 IN

## 2022-12-07 DIAGNOSIS — M54.50 ACUTE RIGHT-SIDED LOW BACK PAIN WITHOUT SCIATICA: Primary | ICD-10-CM

## 2022-12-07 DIAGNOSIS — R10.2 PELVIC PAIN: ICD-10-CM

## 2022-12-07 PROCEDURE — 3079F DIAST BP 80-89 MM HG: CPT | Mod: CPTII,S$GLB,, | Performed by: OBSTETRICS & GYNECOLOGY

## 2022-12-07 PROCEDURE — 1159F PR MEDICATION LIST DOCUMENTED IN MEDICAL RECORD: ICD-10-PCS | Mod: CPTII,S$GLB,, | Performed by: OBSTETRICS & GYNECOLOGY

## 2022-12-07 PROCEDURE — 3079F PR MOST RECENT DIASTOLIC BLOOD PRESSURE 80-89 MM HG: ICD-10-PCS | Mod: CPTII,S$GLB,, | Performed by: OBSTETRICS & GYNECOLOGY

## 2022-12-07 PROCEDURE — 3008F PR BODY MASS INDEX (BMI) DOCUMENTED: ICD-10-PCS | Mod: CPTII,S$GLB,, | Performed by: OBSTETRICS & GYNECOLOGY

## 2022-12-07 PROCEDURE — 3008F BODY MASS INDEX DOCD: CPT | Mod: CPTII,S$GLB,, | Performed by: OBSTETRICS & GYNECOLOGY

## 2022-12-07 PROCEDURE — 3075F PR MOST RECENT SYSTOLIC BLOOD PRESS GE 130-139MM HG: ICD-10-PCS | Mod: CPTII,S$GLB,, | Performed by: OBSTETRICS & GYNECOLOGY

## 2022-12-07 PROCEDURE — 99213 OFFICE O/P EST LOW 20 MIN: CPT | Mod: S$GLB,,, | Performed by: OBSTETRICS & GYNECOLOGY

## 2022-12-07 PROCEDURE — 3075F SYST BP GE 130 - 139MM HG: CPT | Mod: CPTII,S$GLB,, | Performed by: OBSTETRICS & GYNECOLOGY

## 2022-12-07 PROCEDURE — 1159F MED LIST DOCD IN RCRD: CPT | Mod: CPTII,S$GLB,, | Performed by: OBSTETRICS & GYNECOLOGY

## 2022-12-07 PROCEDURE — 99213 PR OFFICE/OUTPT VISIT, EST, LEVL III, 20-29 MIN: ICD-10-PCS | Mod: S$GLB,,, | Performed by: OBSTETRICS & GYNECOLOGY

## 2022-12-07 RX ORDER — PROMETHAZINE HYDROCHLORIDE 25 MG/1
TABLET ORAL
COMMUNITY
Start: 2022-11-29 | End: 2024-04-02

## 2022-12-07 RX ORDER — ONDANSETRON 4 MG/1
TABLET, FILM COATED ORAL
COMMUNITY
Start: 2022-11-23 | End: 2024-01-23

## 2022-12-07 RX ORDER — HYDROCODONE BITARTRATE AND ACETAMINOPHEN 10; 325 MG/1; MG/1
TABLET ORAL
COMMUNITY
Start: 2022-11-19 | End: 2023-01-03 | Stop reason: ALTCHOICE

## 2022-12-07 RX ORDER — NALDEMEDINE 0.2 MG/1
TABLET ORAL
COMMUNITY
Start: 2022-10-26 | End: 2023-12-13

## 2022-12-07 RX ORDER — ATOGEPANT 60 MG/1
TABLET ORAL
COMMUNITY
End: 2023-01-03

## 2022-12-07 RX ORDER — CYCLOBENZAPRINE HCL 10 MG
10 TABLET ORAL 3 TIMES DAILY PRN
Qty: 30 TABLET | Refills: 0 | Status: SHIPPED | OUTPATIENT
Start: 2022-12-07 | End: 2022-12-17

## 2022-12-07 RX ORDER — NABUMETONE 500 MG/1
500 TABLET, FILM COATED ORAL 2 TIMES DAILY
Qty: 28 TABLET | Refills: 0 | Status: SHIPPED | OUTPATIENT
Start: 2022-12-07 | End: 2023-04-14

## 2022-12-07 RX ORDER — GABAPENTIN 100 MG/1
CAPSULE ORAL
COMMUNITY
Start: 2022-11-17 | End: 2023-01-03

## 2022-12-07 RX ORDER — DULOXETIN HYDROCHLORIDE 60 MG/1
30 CAPSULE, DELAYED RELEASE ORAL
COMMUNITY
Start: 2022-11-30 | End: 2023-04-14

## 2022-12-07 RX ORDER — TIZANIDINE 2 MG/1
TABLET ORAL
COMMUNITY
Start: 2022-12-07 | End: 2023-10-13 | Stop reason: SDUPTHER

## 2022-12-07 NOTE — PROGRESS NOTES
"  Chief Complaint: right sided pain     HPI:      Margareth Echeverria is a 42 y.o. female  who presents complaining of RLQ pain where her ovary used to be.  No LMP recorded. Patient has had a hysterectomy. She states this pain starts in her back and wraps around to the front and to her right leg as well.  It is worse with any movement.  Had back imaging done earlier this year prior to this acute episode.    Past Medical History:   Diagnosis Date    Adult hypophosphatasia     AV block     Fibromyalgia     IBS (irritable bowel syndrome)     IC (interstitial cystitis)     Occipital neuralgia     Trigeminal neuralgia     left side of face      Past Surgical History:   Procedure Laterality Date    APPENDECTOMY      AUGMENTATION OF BREAST      x 2    CYSTOSCOPY WITH HYDRODISTENSION AND BIOPSY OF BLADDER  2020    FULGURATION OF ENDOMETRIOSIS  2016    chromopertubation    INGUINAL HERNIA REPAIR Bilateral     LAPAROSCOPIC CHOLECYSTECTOMY      LAPAROSCOPY  2019    with exam under anesthesia    LAPAROSCOPY-ASSISTED VAGINAL HYSTERECTOMY  2019    with right oophorectomy    REMOVAL OF BREAST IMPLANT  2021      Social History     Tobacco Use    Smoking status: Former     Packs/day: 0.50     Years: 10.00     Pack years: 5.00     Types: Cigarettes     Quit date: 2011     Years since quitting: 10.9     Passive exposure: Never    Smokeless tobacco: Never   Substance Use Topics    Alcohol use: Yes    Drug use: Yes     Types: Marijuana     Comment: "medical Marijuana"      Current Outpatient Medications on File Prior to Visit   Medication Sig Dispense Refill    atogepant (QULIPTA) 60 mg Tab       DULoxetine (CYMBALTA) 60 MG capsule       gabapentin (NEURONTIN) 100 MG capsule       HYDROcodone-acetaminophen (NORCO)  mg per tablet       ondansetron (ZOFRAN) 4 MG tablet TAKE 1 TABLET BY MOUTH EVERY 6 HOURS FOR 10 DAYS      promethazine (PHENERGAN) 25 MG tablet       SYMPROIC 0.2 mg Tab       " "tiZANidine (ZANAFLEX) 2 MG tablet        No current facility-administered medications on file prior to visit.      Review of patient's allergies indicates:   Allergen Reactions    Opioids - morphine analogues Hives    Tramadol Other (See Comments)     C/o "aggitated and edgy" with this med.          ROS:     GENERAL: Denies weight gain or weight loss. Feeling well overall.   SKIN: Denies rash or lesions.   NODES: Denies enlarged lymph nodes.   CARDIOVASCULAR: Denies palpitations or chest pain.   ABDOMEN: Denies abdominal pain, constipation, diarrhea, nausea, vomiting or rectal bleeding.   URINARY: Denies frequency, dysuria, hematuria, or burning on urination.  REPRODUCTIVE: See HPI.   BREASTS: Denies pain, lumps, or nipple discharge.   HEMATOLOGIC: Denies easy bruisability or excessive bleeding with the exception of menstrual cycles.  PSYCHIATRIC: Denies depression, anxiety or mood swings.   Physical Exam:      /80 (BP Location: Left arm, Patient Position: Sitting, BP Method: Medium (Manual))   Pulse 80   Ht 5' 4" (1.626 m)   Wt 55.6 kg (122 lb 9.6 oz)   BMI 21.04 kg/m²   Body mass index is 21.04 kg/m².     ABDOMEN: Soft.  No masses. No hepatoslenomegaly. No hernias. Tenderness in all areas. + tenderness on right side of back with palpation  PELVIC: Normal external genitalia without lesions.  Normal hair distribution.  Adequate perineal body, normal urethral meatus.  Urethra and bladder without tenderness or masses. Vagina moist and well rugated without lesions or discharge.  Cervix pink, without lesions, discharge or tenderness.  No significant cystocele or rectocele.  Bimanual exam shows uterus to be normal size, regular, mobile. Adnexal areas without masses +bilateral tenderness      Assessment/Plan:     Acute right-sided low back pain without sciatica  -     cyclobenzaprine (FLEXERIL) 10 MG tablet; Take 1 tablet (10 mg total) by mouth 3 (three) times daily as needed for Muscle spasms.  Dispense: 30 " tablet; Refill: 0  -     nabumetone (RELAFEN) 500 MG tablet; Take 1 tablet (500 mg total) by mouth 2 (two) times daily.  Dispense: 28 tablet; Refill: 0    Pelvic pain  -     cyclobenzaprine (FLEXERIL) 10 MG tablet; Take 1 tablet (10 mg total) by mouth 3 (three) times daily as needed for Muscle spasms.  Dispense: 30 tablet; Refill: 0  -     nabumetone (RELAFEN) 500 MG tablet; Take 1 tablet (500 mg total) by mouth 2 (two) times daily.  Dispense: 28 tablet; Refill: 0    Will obtain results of back imaging and discussed referral for back evaluation

## 2022-12-16 ENCOUNTER — PATIENT MESSAGE (OUTPATIENT)
Dept: OBSTETRICS AND GYNECOLOGY | Facility: CLINIC | Age: 42
End: 2022-12-16
Payer: COMMERCIAL

## 2022-12-21 ENCOUNTER — PATIENT MESSAGE (OUTPATIENT)
Dept: OBSTETRICS AND GYNECOLOGY | Facility: CLINIC | Age: 42
End: 2022-12-21
Payer: COMMERCIAL

## 2022-12-26 ENCOUNTER — PATIENT MESSAGE (OUTPATIENT)
Dept: OBSTETRICS AND GYNECOLOGY | Facility: CLINIC | Age: 42
End: 2022-12-26
Payer: COMMERCIAL

## 2022-12-26 DIAGNOSIS — N83.202 LEFT OVARIAN CYST: Primary | ICD-10-CM

## 2022-12-27 ENCOUNTER — PATIENT MESSAGE (OUTPATIENT)
Dept: OBSTETRICS AND GYNECOLOGY | Facility: CLINIC | Age: 42
End: 2022-12-27
Payer: COMMERCIAL

## 2022-12-27 DIAGNOSIS — R10.2 PELVIC PAIN: Primary | ICD-10-CM

## 2022-12-27 NOTE — TELEPHONE ENCOUNTER
Please notify pt that US shows a very small left ovarian cyst which measures 1.8 cm.  I would agree with her taking a pack of pills and repeating US. Please schedule. Order entered.

## 2023-01-03 ENCOUNTER — PATIENT MESSAGE (OUTPATIENT)
Dept: OBSTETRICS AND GYNECOLOGY | Facility: CLINIC | Age: 43
End: 2023-01-03
Payer: COMMERCIAL

## 2023-01-03 ENCOUNTER — PROCEDURE VISIT (OUTPATIENT)
Dept: OBSTETRICS AND GYNECOLOGY | Facility: CLINIC | Age: 43
End: 2023-01-03
Payer: COMMERCIAL

## 2023-01-03 DIAGNOSIS — N83.202 LEFT OVARIAN CYST: ICD-10-CM

## 2023-01-03 DIAGNOSIS — R10.2 PELVIC PAIN: Primary | ICD-10-CM

## 2023-01-03 DIAGNOSIS — R10.2 PELVIC PAIN: ICD-10-CM

## 2023-01-03 DIAGNOSIS — N83.202 LEFT OVARIAN CYST: Primary | ICD-10-CM

## 2023-01-03 PROCEDURE — 76830 US OB/GYN PROCEDURE (VIEWPOINT): ICD-10-PCS | Mod: S$GLB,,, | Performed by: OBSTETRICS & GYNECOLOGY

## 2023-01-03 PROCEDURE — 76830 TRANSVAGINAL US NON-OB: CPT | Mod: S$GLB,,, | Performed by: OBSTETRICS & GYNECOLOGY

## 2023-01-03 RX ORDER — OXYCODONE AND ACETAMINOPHEN 5; 325 MG/1; MG/1
1 TABLET ORAL EVERY 4 HOURS PRN
Qty: 12 TABLET | Refills: 0 | Status: SHIPPED | OUTPATIENT
Start: 2023-01-03 | End: 2023-04-14

## 2023-01-04 ENCOUNTER — PATIENT MESSAGE (OUTPATIENT)
Dept: OBSTETRICS AND GYNECOLOGY | Facility: CLINIC | Age: 43
End: 2023-01-04
Payer: COMMERCIAL

## 2023-01-04 NOTE — TELEPHONE ENCOUNTER
Discussed with patient that she would have to discuss pain management with Dr. Anaya for her upcoming surgery as he prescribes her medications

## 2023-01-09 ENCOUNTER — OFFICE VISIT (OUTPATIENT)
Dept: OBSTETRICS AND GYNECOLOGY | Facility: CLINIC | Age: 43
End: 2023-01-09
Payer: COMMERCIAL

## 2023-01-09 VITALS — WEIGHT: 122.63 LBS | BODY MASS INDEX: 21.04 KG/M2

## 2023-01-09 DIAGNOSIS — Z01.818 PREOP EXAMINATION: Primary | ICD-10-CM

## 2023-01-09 LAB
APPEARANCE, UA: CLEAR
BASOPHILS NFR BLD: 0.9 % (ref 0–3)
BILIRUB UR QL STRIP: NEGATIVE MG/DL
COLOR UR: NORMAL
EOSINOPHIL NFR BLD: 1.1 % (ref 1–3)
ERYTHROCYTE [DISTWIDTH] IN BLOOD BY AUTOMATED COUNT: 11.9 % (ref 12.5–18)
GLUCOSE (UA): NORMAL MG/DL
HCT VFR BLD AUTO: 40.5 % (ref 37–47)
HGB BLD-MCNC: 15.1 G/DL (ref 12–16)
HGB UR QL STRIP: NEGATIVE /UL
KETONES UR QL STRIP: NEGATIVE MG/DL
LEUKOCYTE ESTERASE UR QL STRIP: NEGATIVE /UL
LYMPHOCYTES NFR BLD: 20.8 % (ref 25–40)
MCH RBC QN AUTO: 34.2 PG (ref 27–31.2)
MCHC RBC AUTO-ENTMCNC: 37.3 G/DL (ref 31.8–35.4)
MCV RBC AUTO: 91.6 FL (ref 80–97)
MONOCYTES NFR BLD: 6 % (ref 1–15)
NEUTROPHILS # BLD AUTO: 4.01 10*3/UL (ref 1.8–7.7)
NEUTROPHILS NFR BLD: 70.8 % (ref 37–80)
NITRITE UR QL STRIP: NEGATIVE
NUCLEATED RED BLOOD CELLS: 0 %
PH UR STRIP: 6 PH (ref 5–9)
PLATELETS: 320 10*3/UL (ref 142–424)
PROT UR QL STRIP: NEGATIVE MG/DL
RBC # BLD AUTO: 4.42 10*6/UL (ref 4.2–5.4)
RPR: NON REACTIVE
SP GR UR STRIP: 1.01 (ref 1–1.03)
SPECIMEN COLLECTION METHOD, URINE: NORMAL
UROBILINOGEN UR STRIP-ACNC: NORMAL MG/DL
WBC # BLD: 5.7 10*3/UL (ref 4.6–10.2)

## 2023-01-09 PROCEDURE — 99499 UNLISTED E&M SERVICE: CPT | Mod: S$GLB,,, | Performed by: OBSTETRICS & GYNECOLOGY

## 2023-01-09 PROCEDURE — 1159F MED LIST DOCD IN RCRD: CPT | Mod: CPTII,S$GLB,, | Performed by: OBSTETRICS & GYNECOLOGY

## 2023-01-09 PROCEDURE — 99499 NO LOS: ICD-10-PCS | Mod: S$GLB,,, | Performed by: OBSTETRICS & GYNECOLOGY

## 2023-01-09 PROCEDURE — 3008F BODY MASS INDEX DOCD: CPT | Mod: CPTII,S$GLB,, | Performed by: OBSTETRICS & GYNECOLOGY

## 2023-01-09 PROCEDURE — 1159F PR MEDICATION LIST DOCUMENTED IN MEDICAL RECORD: ICD-10-PCS | Mod: CPTII,S$GLB,, | Performed by: OBSTETRICS & GYNECOLOGY

## 2023-01-09 PROCEDURE — 3008F PR BODY MASS INDEX (BMI) DOCUMENTED: ICD-10-PCS | Mod: CPTII,S$GLB,, | Performed by: OBSTETRICS & GYNECOLOGY

## 2023-01-09 NOTE — PROGRESS NOTES
"Margareth Echeverria is a 43 y.o. female   For preoperative appointment for laparoscopic left oophorectomy   on 23   with indication: recurrent ovarian cysts, pelvic pain    Past Medical History:   Diagnosis Date    Adult hypophosphatasia     AV block     Fibromyalgia     IBS (irritable bowel syndrome)     IC (interstitial cystitis)     Occipital neuralgia     Trigeminal neuralgia     left side of face       Past Surgical History:   Procedure Laterality Date    APPENDECTOMY      AUGMENTATION OF BREAST      x 2    CYSTOSCOPY WITH HYDRODISTENSION AND BIOPSY OF BLADDER  2020    FULGURATION OF ENDOMETRIOSIS  2016    chromopertubation    INGUINAL HERNIA REPAIR Bilateral     LAPAROSCOPIC CHOLECYSTECTOMY      LAPAROSCOPY  2019    with exam under anesthesia    LAPAROSCOPY-ASSISTED VAGINAL HYSTERECTOMY  2019    with right oophorectomy    REMOVAL OF BREAST IMPLANT  2021       Social History     Socioeconomic History    Marital status:    Tobacco Use    Smoking status: Former     Packs/day: 0.50     Years: 10.00     Pack years: 5.00     Types: Cigarettes     Quit date: 2011     Years since quittin.0     Passive exposure: Never    Smokeless tobacco: Never   Substance and Sexual Activity    Alcohol use: Yes    Drug use: Yes     Types: Marijuana     Comment: "medical Marijuana"    Sexual activity: Yes     Partners: Male     Birth control/protection: See Surgical Hx     Comment: Hyst         Current Outpatient Medications:     DULoxetine (CYMBALTA) 60 MG capsule, , Disp: , Rfl:     ondansetron (ZOFRAN) 4 MG tablet, TAKE 1 TABLET BY MOUTH EVERY 6 HOURS FOR 10 DAYS, Disp: , Rfl:     promethazine (PHENERGAN) 25 MG tablet, , Disp: , Rfl:     SYMPROIC 0.2 mg Tab, , Disp: , Rfl:     tiZANidine (ZANAFLEX) 2 MG tablet, , Disp: , Rfl:     nabumetone (RELAFEN) 500 MG tablet, Take 1 tablet (500 mg total) by mouth 2 (two) times daily. (Patient not taking: Reported on 2023), Disp: 28 " "tablet, Rfl: 0    oxyCODONE-acetaminophen (PERCOCET) 5-325 mg per tablet, Take 1 tablet by mouth every 4 (four) hours as needed for Pain. (Patient not taking: Reported on 1/9/2023), Disp: 12 tablet, Rfl: 0     Review of patient's allergies indicates:   Allergen Reactions    Opioids - morphine analogues Hives    Tramadol Other (See Comments)     C/o "aggitated and edgy" with this med.       ROS:  GENERAL: Denies weight gain or weight loss. Feeling well overall.   SKIN: Denies rash or lesions.   NODES: Denies enlarged lymph nodes.   CHEST: Denies shortness of breath.   CARDIOVASCULAR: Denies palpitations or chest pain.   ABDOMEN: Denies abdominal pain, constipation, diarrhea, nausea, vomiting or rectal bleeding.   URINARY: Denies frequency, dysuria, hematuria, or burning on urination.  REPRODUCTIVE: See HPI.   BREASTS: Denies pain, lumps, or nipple discharge.   HEMATOLOGIC: Denies easy bruisability or excessive bleeding with the exception of menstrual cycles.  PSYCHIATRIC: Denies depression, anxiety or mood swings.     PHYSICAL EXAM:  Wt 55.6 kg (122 lb 9.6 oz)   BMI 21.04 kg/m²    Body mass index is 21.04 kg/m².     APPEARANCE: Well nourished, well developed, in no acute distress.  AFFECT: WNL, alert and oriented x 3  SKIN: No acne or hirsutism  NECK: Neck symmetric without masses or thyromegaly  NODES: No inguinal, cervical, axillary, or femoral lymph node enlargement  CHEST: Good respiratory effect  ABDOMEN: Soft.  No tenderness or masses.  No hepatosplenomegaly.  No hernias.  BREASTS: Symmetrical, no skin changes or visible lesions.  No palpable masses, nipple discharge bilaterally.  PELVIC: Normal external genitalia without lesions.  Normal hair distribution.  Adequate perineal body, normal urethral meatus. Urethra and bladder without tenderness or masses.  Vagina moist and well rugated without lesions or discharge.  Cervix pink, without lesions, discharge or tenderness.  No significant cystocele or rectocele.  " Bimanual exam shows uterus to be normal size, regular, mobile and nontender.  Adnexa without masses or tenderness.    EXTREMITIES: No edema.     Preop examination    Will proceed with laparoscopic left oophorectomy 1/11/23

## 2023-01-11 ENCOUNTER — OUTSIDE PLACE OF SERVICE (OUTPATIENT)
Dept: OBSTETRICS AND GYNECOLOGY | Facility: CLINIC | Age: 43
End: 2023-01-11

## 2023-01-11 PROCEDURE — 58662 LAPAROSCOPY EXCISE LESIONS: CPT | Mod: ,,, | Performed by: OBSTETRICS & GYNECOLOGY

## 2023-01-11 PROCEDURE — 58662 PR LAP,FULGURATE/EXCISE LESIONS: ICD-10-PCS | Mod: ,,, | Performed by: OBSTETRICS & GYNECOLOGY

## 2023-01-12 ENCOUNTER — PATIENT MESSAGE (OUTPATIENT)
Dept: OBSTETRICS AND GYNECOLOGY | Facility: CLINIC | Age: 43
End: 2023-01-12
Payer: COMMERCIAL

## 2023-01-12 LAB
SPECIMEN TO PATHOLOGY: NORMAL
SPECIMEN TO PATHOLOGY: NORMAL

## 2023-01-13 ENCOUNTER — PATIENT MESSAGE (OUTPATIENT)
Dept: OBSTETRICS AND GYNECOLOGY | Facility: CLINIC | Age: 43
End: 2023-01-13

## 2023-01-13 ENCOUNTER — OFFICE VISIT (OUTPATIENT)
Dept: OBSTETRICS AND GYNECOLOGY | Facility: CLINIC | Age: 43
End: 2023-01-13
Payer: COMMERCIAL

## 2023-01-13 VITALS
BODY MASS INDEX: 21.49 KG/M2 | WEIGHT: 125.19 LBS | SYSTOLIC BLOOD PRESSURE: 119 MMHG | DIASTOLIC BLOOD PRESSURE: 77 MMHG

## 2023-01-13 DIAGNOSIS — N95.1 MENOPAUSAL SYMPTOMS: ICD-10-CM

## 2023-01-13 DIAGNOSIS — Z09 POSTOP CHECK: Primary | ICD-10-CM

## 2023-01-13 PROCEDURE — 1159F PR MEDICATION LIST DOCUMENTED IN MEDICAL RECORD: ICD-10-PCS | Mod: CPTII,S$GLB,, | Performed by: OBSTETRICS & GYNECOLOGY

## 2023-01-13 PROCEDURE — 3008F PR BODY MASS INDEX (BMI) DOCUMENTED: ICD-10-PCS | Mod: CPTII,S$GLB,, | Performed by: OBSTETRICS & GYNECOLOGY

## 2023-01-13 PROCEDURE — 3008F BODY MASS INDEX DOCD: CPT | Mod: CPTII,S$GLB,, | Performed by: OBSTETRICS & GYNECOLOGY

## 2023-01-13 PROCEDURE — 1159F MED LIST DOCD IN RCRD: CPT | Mod: CPTII,S$GLB,, | Performed by: OBSTETRICS & GYNECOLOGY

## 2023-01-13 PROCEDURE — 3074F PR MOST RECENT SYSTOLIC BLOOD PRESSURE < 130 MM HG: ICD-10-PCS | Mod: CPTII,S$GLB,, | Performed by: OBSTETRICS & GYNECOLOGY

## 2023-01-13 PROCEDURE — 3078F PR MOST RECENT DIASTOLIC BLOOD PRESSURE < 80 MM HG: ICD-10-PCS | Mod: CPTII,S$GLB,, | Performed by: OBSTETRICS & GYNECOLOGY

## 2023-01-13 PROCEDURE — 99499 UNLISTED E&M SERVICE: CPT | Mod: S$GLB,,, | Performed by: OBSTETRICS & GYNECOLOGY

## 2023-01-13 PROCEDURE — 3074F SYST BP LT 130 MM HG: CPT | Mod: CPTII,S$GLB,, | Performed by: OBSTETRICS & GYNECOLOGY

## 2023-01-13 PROCEDURE — 99499 NO LOS: ICD-10-PCS | Mod: S$GLB,,, | Performed by: OBSTETRICS & GYNECOLOGY

## 2023-01-13 PROCEDURE — 3078F DIAST BP <80 MM HG: CPT | Mod: CPTII,S$GLB,, | Performed by: OBSTETRICS & GYNECOLOGY

## 2023-01-13 RX ORDER — ESTRADIOL 0.1 MG/D
1 FILM, EXTENDED RELEASE TRANSDERMAL
Qty: 24 PATCH | Refills: 4 | Status: SHIPPED | OUTPATIENT
Start: 2023-01-16 | End: 2023-07-20

## 2023-01-13 NOTE — PROGRESS NOTES
CC: Postoperative visit    Margareth Echeverria is a 43 y.o. female  presents for a postoperative visit s/p laparoscopic left oophorectomy on 23.  She is having some redness and swelling in the hand and arm where she had the IV. She has applied heat and elevated and it is improving. She also had an irritated spot on the left labia.  She is feeling well on the weekly patch but it is leaving dark residue on her skin.       Pathology showed:  Benign left ovary and cyst    ROS:  GENERAL: No fever, chills, fatigability or weight loss.  VULVAR: No pain, no lesions and no itching.  VAGINAL: No relaxation, no itching, no discharge, no abnormal bleeding and no lesions.  ABDOMEN: No abdominal pain. Denies nausea. Denies vomiting. No diarrhea. No constipation  URINARY: No incontinence, no nocturia, no frequency and no dysuria.  CARDIOVASCULAR: No chest pain. No shortness of breath. No leg cramps.  NEUROLOGICAL: No headaches. No vision changes.    PHYSICAL EXAM:  /77   Wt 56.8 kg (125 lb 3.2 oz)   BMI 21.49 kg/m²    Body mass index is 21.49 kg/m².   Left hand and arm with no erythema and minimal swelling  INCISIONS: Intact with no erythema or drainage  PELVIC: Normal external genitalia without lesions.        Postop check    Menopausal symptoms  -     estradioL (VIVELLE-DOT) 0.1 mg/24 hr PTSW; Place 1 patch onto the skin twice a week.  Dispense: 24 patch; Refill: 4         Follow up next week for suture removal

## 2023-01-16 NOTE — PROGRESS NOTES
"CC: Postoperative visit    Margareth Echeverria is a 43 y.o. female  presents for a postoperative visit s/p laparoscopic left oophorectomy on 23.  She is is having tenderness near the left lower trocar incision. She denies any drainage or bleeding from it.     ROS:  GENERAL: No fever, chills, fatigability or weight loss.  VULVAR: No pain, no lesions and no itching.  VAGINAL: No relaxation, no itching, no discharge, no abnormal bleeding and no lesions.  ABDOMEN: No abdominal pain. Denies nausea. Denies vomiting. No diarrhea. No constipation  URINARY: No incontinence, no nocturia, no frequency and no dysuria.  CARDIOVASCULAR: No chest pain. No shortness of breath. No leg cramps.  NEUROLOGICAL: No headaches. No vision changes.    PHYSICAL EXAM:  /64   Pulse 75   Ht 5' 4" (1.626 m)   Wt 55.8 kg (123 lb)   BMI 21.11 kg/m²    Body mass index is 21.11 kg/m².     INCISION: Intact with no erythema or drainage  Sutures removed      Postop check        "

## 2023-01-17 ENCOUNTER — PATIENT MESSAGE (OUTPATIENT)
Dept: OBSTETRICS AND GYNECOLOGY | Facility: CLINIC | Age: 43
End: 2023-01-17
Payer: COMMERCIAL

## 2023-01-17 DIAGNOSIS — R10.2 PELVIC PAIN: Primary | ICD-10-CM

## 2023-01-17 LAB
AMORPH URATE CRY URNS QL MICRO: NEGATIVE
BACTERIA #/AREA URNS HPF: ABNORMAL /[HPF]
BILIRUB UR QL STRIP: NEGATIVE
CLARITY UR: CLEAR
COLOR UR: YELLOW
EPITHELIAL CELLS: ABNORMAL
GLUCOSE (UA): NEGATIVE MG/DL
KETONES UR QL STRIP: NEGATIVE MG/DL
LEUKOCYTE ESTERASE UR QL STRIP: NEGATIVE
MUCOUS THREADS URNS QL MICRO: ABNORMAL
NITRITE UR QL STRIP: NEGATIVE
OCCULT BLOOD: NEGATIVE
PH, URINE: 7 (ref 5–7.5)
PROT UR QL STRIP: NEGATIVE MG/DL
RBC/HPF: NEGATIVE
SP GR UR STRIP: 1.01 (ref 1–1.03)
UROBILINOGEN, URINE: NORMAL E.U./DL (ref 0–1)
WBC/HPF: NEGATIVE

## 2023-01-18 ENCOUNTER — OFFICE VISIT (OUTPATIENT)
Dept: OBSTETRICS AND GYNECOLOGY | Facility: CLINIC | Age: 43
End: 2023-01-18
Payer: COMMERCIAL

## 2023-01-18 VITALS
HEART RATE: 75 BPM | DIASTOLIC BLOOD PRESSURE: 64 MMHG | BODY MASS INDEX: 21 KG/M2 | SYSTOLIC BLOOD PRESSURE: 107 MMHG | WEIGHT: 123 LBS | HEIGHT: 64 IN

## 2023-01-18 DIAGNOSIS — Z09 POSTOP CHECK: Primary | ICD-10-CM

## 2023-01-18 PROCEDURE — 3074F PR MOST RECENT SYSTOLIC BLOOD PRESSURE < 130 MM HG: ICD-10-PCS | Mod: CPTII,S$GLB,, | Performed by: OBSTETRICS & GYNECOLOGY

## 2023-01-18 PROCEDURE — 99499 UNLISTED E&M SERVICE: CPT | Mod: S$GLB,,, | Performed by: OBSTETRICS & GYNECOLOGY

## 2023-01-18 PROCEDURE — 3074F SYST BP LT 130 MM HG: CPT | Mod: CPTII,S$GLB,, | Performed by: OBSTETRICS & GYNECOLOGY

## 2023-01-18 PROCEDURE — 3008F BODY MASS INDEX DOCD: CPT | Mod: CPTII,S$GLB,, | Performed by: OBSTETRICS & GYNECOLOGY

## 2023-01-18 PROCEDURE — 1159F PR MEDICATION LIST DOCUMENTED IN MEDICAL RECORD: ICD-10-PCS | Mod: CPTII,S$GLB,, | Performed by: OBSTETRICS & GYNECOLOGY

## 2023-01-18 PROCEDURE — 3078F PR MOST RECENT DIASTOLIC BLOOD PRESSURE < 80 MM HG: ICD-10-PCS | Mod: CPTII,S$GLB,, | Performed by: OBSTETRICS & GYNECOLOGY

## 2023-01-18 PROCEDURE — 3078F DIAST BP <80 MM HG: CPT | Mod: CPTII,S$GLB,, | Performed by: OBSTETRICS & GYNECOLOGY

## 2023-01-18 PROCEDURE — 1159F MED LIST DOCD IN RCRD: CPT | Mod: CPTII,S$GLB,, | Performed by: OBSTETRICS & GYNECOLOGY

## 2023-01-18 PROCEDURE — 3008F PR BODY MASS INDEX (BMI) DOCUMENTED: ICD-10-PCS | Mod: CPTII,S$GLB,, | Performed by: OBSTETRICS & GYNECOLOGY

## 2023-01-18 PROCEDURE — 99499 NO LOS: ICD-10-PCS | Mod: S$GLB,,, | Performed by: OBSTETRICS & GYNECOLOGY

## 2023-01-22 ENCOUNTER — PATIENT MESSAGE (OUTPATIENT)
Dept: OBSTETRICS AND GYNECOLOGY | Facility: CLINIC | Age: 43
End: 2023-01-22
Payer: COMMERCIAL

## 2023-04-10 ENCOUNTER — TELEPHONE (OUTPATIENT)
Dept: FAMILY MEDICINE | Facility: CLINIC | Age: 43
End: 2023-04-10
Payer: COMMERCIAL

## 2023-04-10 ENCOUNTER — PATIENT MESSAGE (OUTPATIENT)
Dept: FAMILY MEDICINE | Facility: CLINIC | Age: 43
End: 2023-04-10
Payer: COMMERCIAL

## 2023-04-10 NOTE — TELEPHONE ENCOUNTER
Referral received from Dr. Lawrence Staff. Attempted to contact patient to set up new patient appointment. No answer. Left detailed message for her to return call.  -Merrick Garner LPN

## 2023-04-12 LAB — BCS RECOMMENDATION EXT: NORMAL

## 2023-04-14 ENCOUNTER — OFFICE VISIT (OUTPATIENT)
Dept: FAMILY MEDICINE | Facility: CLINIC | Age: 43
End: 2023-04-14
Payer: COMMERCIAL

## 2023-04-14 VITALS
DIASTOLIC BLOOD PRESSURE: 59 MMHG | HEART RATE: 90 BPM | SYSTOLIC BLOOD PRESSURE: 127 MMHG | BODY MASS INDEX: 20.23 KG/M2 | HEIGHT: 64 IN | WEIGHT: 118.5 LBS | OXYGEN SATURATION: 99 %

## 2023-04-14 DIAGNOSIS — F90.0 ATTENTION DEFICIT HYPERACTIVITY DISORDER (ADHD), PREDOMINANTLY INATTENTIVE TYPE: ICD-10-CM

## 2023-04-14 DIAGNOSIS — Z00.00 PREVENTATIVE HEALTH CARE: Primary | ICD-10-CM

## 2023-04-14 DIAGNOSIS — E89.40 SURGICAL MENOPAUSE: ICD-10-CM

## 2023-04-14 PROCEDURE — 99386 PREV VISIT NEW AGE 40-64: CPT | Mod: S$GLB,,, | Performed by: FAMILY MEDICINE

## 2023-04-14 PROCEDURE — 3074F SYST BP LT 130 MM HG: CPT | Mod: CPTII,S$GLB,, | Performed by: FAMILY MEDICINE

## 2023-04-14 PROCEDURE — 3078F PR MOST RECENT DIASTOLIC BLOOD PRESSURE < 80 MM HG: ICD-10-PCS | Mod: CPTII,S$GLB,, | Performed by: FAMILY MEDICINE

## 2023-04-14 PROCEDURE — 1160F RVW MEDS BY RX/DR IN RCRD: CPT | Mod: CPTII,S$GLB,, | Performed by: FAMILY MEDICINE

## 2023-04-14 PROCEDURE — 3074F PR MOST RECENT SYSTOLIC BLOOD PRESSURE < 130 MM HG: ICD-10-PCS | Mod: CPTII,S$GLB,, | Performed by: FAMILY MEDICINE

## 2023-04-14 PROCEDURE — 1159F MED LIST DOCD IN RCRD: CPT | Mod: CPTII,S$GLB,, | Performed by: FAMILY MEDICINE

## 2023-04-14 PROCEDURE — 1160F PR REVIEW ALL MEDS BY PRESCRIBER/CLIN PHARMACIST DOCUMENTED: ICD-10-PCS | Mod: CPTII,S$GLB,, | Performed by: FAMILY MEDICINE

## 2023-04-14 PROCEDURE — 99386 PR PREVENTIVE VISIT,NEW,40-64: ICD-10-PCS | Mod: S$GLB,,, | Performed by: FAMILY MEDICINE

## 2023-04-14 PROCEDURE — 1159F PR MEDICATION LIST DOCUMENTED IN MEDICAL RECORD: ICD-10-PCS | Mod: CPTII,S$GLB,, | Performed by: FAMILY MEDICINE

## 2023-04-14 PROCEDURE — 3008F PR BODY MASS INDEX (BMI) DOCUMENTED: ICD-10-PCS | Mod: CPTII,S$GLB,, | Performed by: FAMILY MEDICINE

## 2023-04-14 PROCEDURE — 3078F DIAST BP <80 MM HG: CPT | Mod: CPTII,S$GLB,, | Performed by: FAMILY MEDICINE

## 2023-04-14 PROCEDURE — 3008F BODY MASS INDEX DOCD: CPT | Mod: CPTII,S$GLB,, | Performed by: FAMILY MEDICINE

## 2023-04-14 RX ORDER — LISDEXAMFETAMINE DIMESYLATE CAPSULES 20 MG/1
20 CAPSULE ORAL EVERY MORNING
Qty: 30 CAPSULE | Refills: 0 | Status: SHIPPED | OUTPATIENT
Start: 2023-04-14 | End: 2023-04-21 | Stop reason: SDUPTHER

## 2023-04-14 NOTE — PROGRESS NOTES
Subjective     Patient ID: Margareth Echeverria is a 43 y.o. female.    Chief Complaint: Establish Care (Patient is here to get established )    HPI    Here to est care  Hx ctd/fm, ic, trigeminal neuralgia and add  She is on vyvanse 10 mg for add and doing well x 1 mo  Utd mmg and hx hys  On HRT    Review of Systems   Constitutional:  Negative for chills, diaphoresis, fatigue, fever and unexpected weight change.   HENT:  Negative for nasal congestion, hearing loss, rhinorrhea, sinus pressure/congestion, sore throat, trouble swallowing and voice change.    Eyes:  Negative for pain and visual disturbance.   Respiratory:  Negative for cough, chest tightness, shortness of breath and wheezing.    Cardiovascular:  Negative for chest pain, palpitations and leg swelling.   Gastrointestinal:  Negative for abdominal pain, constipation, diarrhea, nausea and vomiting.   Genitourinary:  Negative for decreased urine volume, dysuria, flank pain, frequency, hematuria, pelvic pain, vaginal bleeding and vaginal discharge.   Musculoskeletal:  Negative for arthralgias, back pain, myalgias and neck pain.   Integumentary:  Negative for color change, pallor and rash.   Neurological:  Negative for dizziness, syncope, weakness, light-headedness and headaches.   Hematological:  Negative for adenopathy.   Psychiatric/Behavioral:  Negative for decreased concentration, dysphoric mood and sleep disturbance. The patient is not nervous/anxious.         Objective     Physical Exam  Vitals reviewed.   Constitutional:       General: She is not in acute distress.     Appearance: She is well-developed. She is not diaphoretic.   HENT:      Head: Normocephalic and atraumatic.      Nose: Nose normal.      Mouth/Throat:      Mouth: Mucous membranes are moist.      Pharynx: Oropharynx is clear.   Eyes:      Conjunctiva/sclera: Conjunctivae normal.      Pupils: Pupils are equal, round, and reactive to light.   Neck:      Thyroid: No thyromegaly.      Vascular:  No JVD.   Cardiovascular:      Rate and Rhythm: Normal rate and regular rhythm.      Pulses: Normal pulses.      Heart sounds: Normal heart sounds. No murmur heard.    No friction rub. No gallop.   Pulmonary:      Effort: Pulmonary effort is normal. No respiratory distress.      Breath sounds: Normal breath sounds. No stridor. No wheezing or rales.   Abdominal:      General: Bowel sounds are normal. There is no distension.      Palpations: Abdomen is soft.      Tenderness: There is no abdominal tenderness.   Musculoskeletal:         General: No swelling or tenderness.      Cervical back: Normal range of motion and neck supple.      Right lower leg: No edema.      Left lower leg: No edema.   Lymphadenopathy:      Cervical: No cervical adenopathy.   Skin:     General: Skin is warm and dry.      Coloration: Skin is not pale.      Findings: No erythema or rash.   Neurological:      Mental Status: She is alert and oriented to person, place, and time.   Psychiatric:         Mood and Affect: Mood normal.         Behavior: Behavior normal.          Assessment and Plan     Problem List Items Addressed This Visit          Psychiatric    Attention deficit hyperactivity disorder (ADHD), predominantly inattentive type    Relevant Medications    lisdexamfetamine (VYVANSE) 20 MG capsule     Other Visit Diagnoses       Preventative health care    -  Primary    Surgical menopause        Relevant Orders    Ambulatory referral/consult to Obstetrics / Gynecology            Preventative health care    Attention deficit hyperactivity disorder (ADHD), predominantly inattentive type  -     lisdexamfetamine (VYVANSE) 20 MG capsule; Take 1 capsule (20 mg total) by mouth every morning.  Dispense: 30 capsule; Refill: 0    Surgical menopause  -     Ambulatory referral/consult to Obstetrics / Gynecology; Future; Expected date: 04/21/2023

## 2023-04-20 ENCOUNTER — TELEPHONE (OUTPATIENT)
Dept: FAMILY MEDICINE | Facility: CLINIC | Age: 43
End: 2023-04-20
Payer: COMMERCIAL

## 2023-04-20 NOTE — TELEPHONE ENCOUNTER
----- Message from Belinda Joyce sent at 4/20/2023 12:22 PM CDT -----  Contact: Patient  Patient called to consult with nurse or staff regarding her prescription for lisdexamfetamine (VYVANSE) 20 MG capsule. She states [facility], [address] [place], [address], [phone] has the medication in stock and would like her prescription transferred there. She would like a call back regarding this and can be reached at [phone]. Thanks/MR

## 2023-04-21 ENCOUNTER — PATIENT MESSAGE (OUTPATIENT)
Dept: FAMILY MEDICINE | Facility: CLINIC | Age: 43
End: 2023-04-21
Payer: COMMERCIAL

## 2023-04-21 DIAGNOSIS — F90.0 ATTENTION DEFICIT HYPERACTIVITY DISORDER (ADHD), PREDOMINANTLY INATTENTIVE TYPE: ICD-10-CM

## 2023-04-21 RX ORDER — LISDEXAMFETAMINE DIMESYLATE CAPSULES 20 MG/1
20 CAPSULE ORAL EVERY MORNING
Qty: 30 CAPSULE | Refills: 0 | Status: SHIPPED | OUTPATIENT
Start: 2023-04-21 | End: 2023-05-12 | Stop reason: SDUPTHER

## 2023-04-21 NOTE — TELEPHONE ENCOUNTER
----- Message from Nicole Fraser MA sent at 4/20/2023  1:27 PM CDT -----  Regarding: FW: returning call  Contact: pt  Patient called and stated that [facility] in [facility] has her Vyvanse can you please send it there   ----- Message -----  From: Orly Muñoz  Sent: 4/20/2023   1:23 PM CDT  To: Kandi Louis ([facility]) Staff  Subject: returning call                                   Type:  Patient Returning Call    Who Called: Margareth Echeverria   Who Left Message for Patient: Nicole  Does the patient know what this is regarding?: unk  Would the patient rather a call back or a response via [facility]? Call back   Best Call Back Number: [phone]  Additional Information:

## 2023-04-27 ENCOUNTER — PATIENT OUTREACH (OUTPATIENT)
Dept: ADMINISTRATIVE | Facility: HOSPITAL | Age: 43
End: 2023-04-27
Payer: COMMERCIAL

## 2023-05-05 ENCOUNTER — PATIENT MESSAGE (OUTPATIENT)
Dept: FAMILY MEDICINE | Facility: CLINIC | Age: 43
End: 2023-05-05
Payer: COMMERCIAL

## 2023-05-09 DIAGNOSIS — J32.9 SINUSITIS, UNSPECIFIED CHRONICITY, UNSPECIFIED LOCATION: Primary | ICD-10-CM

## 2023-05-09 RX ORDER — AZITHROMYCIN 250 MG/1
TABLET, FILM COATED ORAL
Qty: 6 TABLET | Refills: 0 | Status: SHIPPED | OUTPATIENT
Start: 2023-05-09 | End: 2023-05-14

## 2023-05-09 RX ORDER — METHYLPREDNISOLONE 4 MG/1
TABLET ORAL
Qty: 1 EACH | Refills: 0 | Status: SHIPPED | OUTPATIENT
Start: 2023-05-09 | End: 2023-07-20

## 2023-05-12 DIAGNOSIS — F90.0 ATTENTION DEFICIT HYPERACTIVITY DISORDER (ADHD), PREDOMINANTLY INATTENTIVE TYPE: ICD-10-CM

## 2023-05-16 RX ORDER — LISDEXAMFETAMINE DIMESYLATE CAPSULES 20 MG/1
20 CAPSULE ORAL EVERY MORNING
Qty: 30 CAPSULE | Refills: 0 | Status: SHIPPED | OUTPATIENT
Start: 2023-05-16 | End: 2023-07-22 | Stop reason: SDUPTHER

## 2023-05-22 ENCOUNTER — PATIENT MESSAGE (OUTPATIENT)
Dept: OBSTETRICS AND GYNECOLOGY | Facility: CLINIC | Age: 43
End: 2023-05-22

## 2023-05-22 ENCOUNTER — OFFICE VISIT (OUTPATIENT)
Dept: OBSTETRICS AND GYNECOLOGY | Facility: CLINIC | Age: 43
End: 2023-05-22
Payer: COMMERCIAL

## 2023-05-22 VITALS
WEIGHT: 114 LBS | HEIGHT: 64 IN | SYSTOLIC BLOOD PRESSURE: 137 MMHG | BODY MASS INDEX: 19.46 KG/M2 | DIASTOLIC BLOOD PRESSURE: 76 MMHG | HEART RATE: 109 BPM

## 2023-05-22 DIAGNOSIS — R10.2 PELVIC PAIN: Primary | ICD-10-CM

## 2023-05-22 DIAGNOSIS — M53.3 CHRONIC SI JOINT PAIN: ICD-10-CM

## 2023-05-22 DIAGNOSIS — E89.40 SURGICAL MENOPAUSE: ICD-10-CM

## 2023-05-22 DIAGNOSIS — G89.29 CHRONIC SI JOINT PAIN: ICD-10-CM

## 2023-05-22 PROCEDURE — 99213 OFFICE O/P EST LOW 20 MIN: CPT | Mod: S$GLB,,, | Performed by: OBSTETRICS & GYNECOLOGY

## 2023-05-22 PROCEDURE — 1159F MED LIST DOCD IN RCRD: CPT | Mod: CPTII,S$GLB,, | Performed by: OBSTETRICS & GYNECOLOGY

## 2023-05-22 PROCEDURE — 3078F PR MOST RECENT DIASTOLIC BLOOD PRESSURE < 80 MM HG: ICD-10-PCS | Mod: CPTII,S$GLB,, | Performed by: OBSTETRICS & GYNECOLOGY

## 2023-05-22 PROCEDURE — 99213 PR OFFICE/OUTPT VISIT, EST, LEVL III, 20-29 MIN: ICD-10-PCS | Mod: S$GLB,,, | Performed by: OBSTETRICS & GYNECOLOGY

## 2023-05-22 PROCEDURE — 3075F SYST BP GE 130 - 139MM HG: CPT | Mod: CPTII,S$GLB,, | Performed by: OBSTETRICS & GYNECOLOGY

## 2023-05-22 PROCEDURE — 1159F PR MEDICATION LIST DOCUMENTED IN MEDICAL RECORD: ICD-10-PCS | Mod: CPTII,S$GLB,, | Performed by: OBSTETRICS & GYNECOLOGY

## 2023-05-22 PROCEDURE — 3008F PR BODY MASS INDEX (BMI) DOCUMENTED: ICD-10-PCS | Mod: CPTII,S$GLB,, | Performed by: OBSTETRICS & GYNECOLOGY

## 2023-05-22 PROCEDURE — 3075F PR MOST RECENT SYSTOLIC BLOOD PRESS GE 130-139MM HG: ICD-10-PCS | Mod: CPTII,S$GLB,, | Performed by: OBSTETRICS & GYNECOLOGY

## 2023-05-22 PROCEDURE — 3008F BODY MASS INDEX DOCD: CPT | Mod: CPTII,S$GLB,, | Performed by: OBSTETRICS & GYNECOLOGY

## 2023-05-22 PROCEDURE — 3078F DIAST BP <80 MM HG: CPT | Mod: CPTII,S$GLB,, | Performed by: OBSTETRICS & GYNECOLOGY

## 2023-05-22 RX ORDER — LEVOTHYROXINE, LIOTHYRONINE 19; 4.5 UG/1; UG/1
TABLET ORAL
COMMUNITY
Start: 2023-04-20 | End: 2023-07-20

## 2023-05-22 RX ORDER — DULOXETIN HYDROCHLORIDE 30 MG/1
CAPSULE, DELAYED RELEASE ORAL
COMMUNITY
Start: 2023-05-01 | End: 2023-07-20 | Stop reason: DRUGHIGH

## 2023-05-22 RX ORDER — PROGESTERONE 100 MG/1
CAPSULE ORAL
COMMUNITY
Start: 2023-04-20 | End: 2024-01-23

## 2023-05-22 RX ORDER — LEVOCETIRIZINE DIHYDROCHLORIDE 5 MG/1
TABLET, FILM COATED ORAL
COMMUNITY
Start: 2023-03-31 | End: 2024-01-23

## 2023-05-22 RX ORDER — PROGESTERONE 200 MG/1
CAPSULE ORAL
COMMUNITY
Start: 2023-04-10 | End: 2024-01-23

## 2023-05-22 RX ORDER — CYCLOBENZAPRINE HCL 5 MG
5 TABLET ORAL 3 TIMES DAILY PRN
Qty: 20 TABLET | Refills: 1 | Status: SHIPPED | OUTPATIENT
Start: 2023-05-22 | End: 2023-06-01

## 2023-05-22 RX ORDER — HYDROCODONE BITARTRATE AND ACETAMINOPHEN 10; 325 MG/1; MG/1
TABLET ORAL
COMMUNITY
Start: 2023-05-17 | End: 2023-08-16

## 2023-05-22 NOTE — PROGRESS NOTES
Subjective:      Patient ID: Margareth Echeverria is a 43 y.o. female.    Chief Complaint:  Pelvic Pain (Patient presents in office with pain in pelvic area that radiates to her back for 2weeks, light pink spotting. Denies intercourse due to pelvic pain. Hyst in January. )      History of Present Illness  Pelvic Pain  The patient's primary symptoms include pelvic pain. The current episode started in the past 7 days. The problem occurs daily. The problem has been waxing and waning. The pain is moderate. The problem affects both sides. She is not pregnant. Associated symptoms include back pain, flank pain and painful intercourse. Pertinent negatives include no abdominal pain, anorexia, chills, constipation, diarrhea, discolored urine, dysuria, fever, frequency, headaches, hematuria, nausea, rash, urgency or vomiting. The symptoms are aggravated by activity, bowel movements, heavy lifting, intercourse and tactile pressure. She has tried acetaminophen, heating pad, NSAIDs, oral narcotics and warm bath for the symptoms. The treatment provided no relief. She is not sexually active. No, her partner does not have an STD. She is postmenopausal. Her past medical history is significant for endometriosis, a gynecological surgery and ovarian cysts. There is no history of an abdominal surgery, a  section, an ectopic pregnancy, herpes simplex, menorrhagia, metrorrhagia, miscarriage, perineal abscess, PID, an STD, a terminated pregnancy or vaginosis.   Patient with severe pain and went to ED. Recent TLH for Endometriosis. Patient stopped hormone patch and now patient is getting pellets from [facility]. Pain began to get worse after getting pellets (pellets for 2 months and pain started 2 weeks ago)  Has not had sex in 2 months due to level of pain, did get better after surgery and now returned.  Patch helped slightly and patient was having cramps and felt she had too much of it and felt tired. Once stopped the patch,  "patient felt better. Patient was told levels were good enough to where did not need patch.  S/p Bilateral Oophorectomy as well due to pain, was told ovary was removed due to "cyst"    GYN & OB History  No LMP recorded. Patient has had a hysterectomy.   Date of Last Pap: No result found    OB History    Para Term  AB Living   1 1       1   SAB IAB Ectopic Multiple Live Births           1      # Outcome Date GA Lbr Jose Armando/2nd Weight Sex Delivery Anes PTL Lv   1 Para                Review of Systems  Review of Systems   Constitutional:  Negative for chills and fever.   Gastrointestinal:  Negative for abdominal pain, anorexia, constipation, diarrhea, nausea and vomiting.   Genitourinary:  Positive for flank pain and pelvic pain. Negative for dysuria, frequency, hematuria, menorrhagia and urgency.   Musculoskeletal:  Positive for back pain.   Integumentary:  Negative for rash.   Neurological:  Negative for headaches.        Objective:     Physical Exam:   Constitutional: She is oriented to person, place, and time. Vital signs are normal. She appears well-developed and well-nourished. She is cooperative.      Neck: No thyroid mass and no thyromegaly present.    Cardiovascular:  Normal rate and normal pulses.             Pulmonary/Chest: Effort normal. No respiratory distress. Right breast exhibits no nipple discharge, no skin change, no swelling and no mastectomy. Left breast exhibits no nipple discharge, no skin change, no swelling and no mastectomy. Breasts are asymmetrical.   Scar from implant removal.        Abdominal: Soft. She exhibits no distension. There is no abdominal tenderness. No hernia.     Genitourinary:    Vagina and rectum normal.   The external female genitalia was normal.   There is an absent right adnexa and an absent left adnexa. Cervix is absent.Uterus is absent.    Genitourinary Comments: Tenderness on exam. Patient with exam, bilateral SI joints and along pelvic floor.           "   Musculoskeletal: Moves all extremeties.       Neurological: She is alert and oriented to person, place, and time.    Skin: Skin is warm and dry. No rash noted.    Psychiatric: She has a normal mood and affect. Her speech is normal and behavior is normal. Judgment and thought content normal.       Assessment:     1. Surgical menopause    Endometriosis   Dyspareunia       Plan:     Patient with rare bone disorder with hypophosphatemia. Patient with regular bone scans.   Managed by Dr. Anaya. Patient had an allergic reaction to Strensiq. No other alternative available.   Plan on referral to Dr. Reddy.   Plan on PT referral.  Referral to pain management.

## 2023-05-22 NOTE — PROGRESS NOTES
Answers submitted by the patient for this visit:  Pelvic Pain Questionnaire (Submitted on 2023)  Chief Complaint: Pelvic pain  Onset: in the past 7 days  Frequency: daily  Progression since onset: waxing and waning  Pain severity: moderate  Affected side: both  Pregnant now?: No  abdominal pain: No  anorexia: No  back pain: Yes  chills: No  constipation: No  diarrhea: No  discolored urine: No  dysuria: No  fever: No  flank pain: Yes  frequency: No  headaches: No  hematuria: No  nausea: No  painful intercourse: Yes  rash: No  urgency: No  vomiting: No  Aggravated by: activity, bowel movements, heavy lifting, intercourse, tactile pressure  treatments tried: acetaminophen, heating pad, NSAIDs, oral narcotics, warm bath  Improvement on treatment: no relief  Sexual activity: not sexually active  Partner with STD symptoms: no  Menstrual history: postmenopausal  STD: No  abdominal surgery: No   section: No  Ectopic pregnancy: No  Endometriosis: Yes  herpes simplex: No  gynecological surgery: Yes  menorrhagia: No  metrorrhagia: No  miscarriage: No  ovarian cysts: Yes  perineal abscess: No  PID: No  terminated pregnancy: No  vaginosis: No

## 2023-05-23 ENCOUNTER — TELEPHONE (OUTPATIENT)
Dept: ADMINISTRATIVE | Facility: OTHER | Age: 43
End: 2023-05-23
Payer: COMMERCIAL

## 2023-05-23 NOTE — TELEPHONE ENCOUNTER
DAIANA for patient to contact our scheduling office to discuss scheduling appointment with Dr.Kelly Steve. Contact number provided, and portal message sent.

## 2023-05-24 ENCOUNTER — PATIENT MESSAGE (OUTPATIENT)
Dept: FAMILY MEDICINE | Facility: CLINIC | Age: 43
End: 2023-05-24
Payer: COMMERCIAL

## 2023-05-24 ENCOUNTER — PATIENT MESSAGE (OUTPATIENT)
Dept: OBSTETRICS AND GYNECOLOGY | Facility: CLINIC | Age: 43
End: 2023-05-24
Payer: COMMERCIAL

## 2023-05-24 NOTE — TELEPHONE ENCOUNTER
This patient would like for step-daughter to been see she would be a new patient. She wants her to see Dr. Chau

## 2023-06-07 ENCOUNTER — PATIENT MESSAGE (OUTPATIENT)
Dept: FAMILY MEDICINE | Facility: CLINIC | Age: 43
End: 2023-06-07
Payer: COMMERCIAL

## 2023-06-07 NOTE — PROGRESS NOTES
[facility]  New Patient H&P    Referring Provider: Nelda Thornton Md  [address] [facility]  [address]  [place] [address]    Chief Complaint:   Chief Complaint   Patient presents with    Pelvic Pain       History of Present Illness: Margareth Echeverria is a 43 y.o. female referred by Dr. Nelda Thornton for Pelvic pain.      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 and she has a history of chronic pelvic pain and LBP with prior hysterectomy and b/l oophorectomy and she was feeling pretty good in regards to her chronic pelvic pain until Mother's day where she developed rather sudden increase in her pelvic pain with no clear inciting event  Location: involves her entire pelvis, but seems to be primarily localized over her sacrum area  Radiation: She feels some radiation into her anterior thighs  Timing: constant  Quality: Aching, Throbbing, Pounding, Deep, Stabbing, and nagging and swelling  Exacerbating Factors: Bowel movements, valsalva, intercourse, sitting, standing prolonged  Alleviating Factors: nothing  Associated Symptoms: She has neuropathy in her legs/feet from prior to this incident. She feels weakness in her legs. She has chronic constipation, but has been having daily BMs. She does note recent weight loss (states about 30 lbs). She does note night sweats. She denies fevers, urinary incontinence/change in function and bowel incontinence/change in function    Nothing seems to be helping, she has tried heating, cold. She saw gynecology and was sent here and to pelvic floor rehab.     She denies aggravation with urination.     Severity: Currently: 8/10   Typical Range: 5-10/10     Exacerbation: 10/10     P = 8  E = 10  G = 10  Baseline PEG Score = 9.33  Current PEG Score: 9.33    Opioid Risk Score         Value Time User    Opioid Risk Score  7 6/8/2023 10:19 AM Katharine Steve MD             Previous Interventions:  - Procedures for neck and back pain,  but not for pelvic pain    Previous Therapies:  PT/OT: yes for back pain but not pelvic floor  Relevant Surgery: yes    - surgery for trigeminal neuralgia  Previous Medications:   - NSAIDS:   - Muscle Relaxants: Zanaflex 2mg   - TCAs:   - SNRIs: cymbalta  - Topicals: CBD  - Anticonvulsants:    - Opioids: hydrocodone    Current Pain Medications:  Cymbalta 30mg  Norco 10/325mg   Tizanidine 2 mg     Blood Thinners: No    Full Medication List:    Current Outpatient Medications:     DULoxetine (CYMBALTA) 30 MG capsule, , Disp: , Rfl:     HYDROcodone-acetaminophen (NORCO)  mg per tablet, , Disp: , Rfl:     levocetirizine (XYZAL) 5 MG tablet, , Disp: , Rfl:     lisdexamfetamine (VYVANSE) 20 MG capsule, Take 1 capsule (20 mg total) by mouth every morning., Disp: 30 capsule, Rfl: 0    NP THYROID 30 mg Tab, , Disp: , Rfl:     ondansetron (ZOFRAN) 4 MG tablet, TAKE 1 TABLET BY MOUTH EVERY 6 HOURS FOR 10 DAYS, Disp: , Rfl:     progesterone (PROMETRIUM) 100 MG capsule, , Disp: , Rfl:     progesterone (PROMETRIUM) 200 MG capsule, , Disp: , Rfl:     promethazine (PHENERGAN) 25 MG tablet, , Disp: , Rfl:     SYMPROIC 0.2 mg Tab, , Disp: , Rfl:     tiZANidine (ZANAFLEX) 2 MG tablet, , Disp: , Rfl:     diazePAM (VALIUM) 5 MG tablet, Take 1 tablet (5 mg total) by mouth once. 45 minutes to 1 hour prior to MRI for procedural anxiety for 1 dose, Disp: 1 tablet, Rfl: 0    duloxetine HCl (CYMBALTA ORAL), , Disp: , Rfl:     estradioL (VIVELLE-DOT) 0.1 mg/24 hr PTSW, Place 1 patch onto the skin twice a week. (Patient not taking: Reported on 5/22/2023), Disp: 24 patch, Rfl: 4    methylPREDNISolone (MEDROL DOSEPACK) 4 mg tablet, use as directed (Patient not taking: Reported on 5/22/2023), Disp: 1 each, Rfl: 0     Review of Systems:  ROS    Allergies:  Opioids - morphine analogues, Bee pollen, Hydrocodone, Morphine, and Tramadol     Medical History:   has a past medical history of Adult hypophosphatasia, AV block, Fibromyalgia, IBS  "(irritable bowel syndrome), IC (interstitial cystitis), Occipital neuralgia, and Trigeminal neuralgia.    Surgical History:   has a past surgical history that includes Inguinal hernia repair (Bilateral); Laparoscopic cholecystectomy; Augmentation of breast; Fulguration of endometriosis (07/05/2016); Laparoscopy-assisted vaginal hysterectomy (11/11/2019); Laparoscopy (12/08/2019); Appendectomy; Cystoscopy with hydrodistension and biopsy of bladder (12/28/2020); Removal of breast implant (04/2021); and Hysterectomy.    Family History:  family history includes Breast cancer in her maternal cousin; Lung cancer in her paternal grandmother; No Known Problems in her father and mother.    Social History:   reports that she quit smoking about 11 years ago. Her smoking use included cigarettes. She has a 5.00 pack-year smoking history. She has never been exposed to tobacco smoke. She has never used smokeless tobacco. She reports current alcohol use. She reports current drug use. Drug: Marijuana.    Physical Exam:  /70   Pulse 76   Resp 16   Ht 5' 4" (1.626 m)   Wt 53.5 kg (118 lb)   SpO2 99%   BMI 20.25 kg/m²   GEN: No acute distress. Calm, comfortable  HENT: Normocephalic, atraumatic, moist mucous membranes  EYE: Anicteric sclera, non-injected.   CV: Non-diaphoretic. Regular Rate. Radial Pulses 2+.  RESP: Breathing comfortably. Chest expansion symmetric.  EXT: No clubbing, cyanosis.   SKIN: Warm, & dry to palpation. No visible rashes or lesions of exposed skin.   PSYCH: Pleasant mood and appropriate affect. Recent and remote memory intact.   GAIT: Independent, normal ambulation  Lumbar Spine Exam:       Inspection: No erythema, bruising.       Palpation: (+) TTP of b/l SIJ > lumbar paraspinals b/l. (+) TTP of sacrum and coccyx         ROM:  Limited in flexion, extension, lateral bending.       (+) Facet loading bilaterally      (-) Straight Leg Raise bilaterally      (+) ISABELLA bilaterally      (+) SIJ Compression " Test bilaterally      (+) Gaenslan's Test bilaterally      (+) Thigh thrust/Posterior Pelvic Pain Provocation Test bilaterally      (+) Sacral thrust  Hip/Pelvis Exam:      Inspection: No gross deformity or apparent leg length discrepancy      Palpation: (+) TTP along inguinal ligaments b/l, pubic symphysis. (+) TTP right ischial bursa and b/l piriformis and gluteal muscles, (-) TTP to bilateral greater trochanteric bursas.       ROM:  No limitation or pain in internal rotation, external rotation b/l  Neurologic Exam:     Alert. Speech is fluent and appropriate.     Strength: 4/5 diffusely in the left leg, otherwise 5/5 throughout bilateral lower extremities     Sensation:  Grossly intact to light touch in bilateral lower extremities     Reflexes: 2+ in b/l patella, achilles     Tone: No abnormality appreciated in bilateral lower extremities     No Clonus     Downgoing toes on plantar stimulation     (-) Franco bilaterally                Imaging:  - X-ray lumbar spine 6/8/23:  Transitional anatomy with sacralization of L5.  Vertebral body heights maintained.  No spondylolisthesis.  Disc spaces maintained.  Mild facet arthropathy.  No acute osseous abnormality.  Bilateral SI joint degenerative changes.  Constipation suspected.    - X-ray pelvis 6/8/23:  Hip joint spaces maintained.  Likely transitional anatomy with L5 sacralization.  Symmetric degenerative findings of the bilateral SI joints.  Constipation findings noted.    - Pelvis CT w/o contrast 1/6/23:  The visualized gastrointestinal tract appears normal.  Urinary bladder is normal.  Inguinal regions are normal.  No masses, fluid collections or adenopathy seen.  1. There is a cystic area on the left side of the pelvis measuring about 5.8 cm. This has a simple cystic appearance to it. Statistically this most likely represents a ovarian cyst. The patient's uterus has apparently beenremoved.  The bony elements appear intact.  Impression:  1. Cyst on the left side  of the pelvis. Statistically most likely represents an ovarian cyst. Evaluation with pelvic ultrasound is recommended.    - Pelvic US 12/21/22:  Transabdominal and transvaginal pelvic ultrasound was performed by the sonographer. Static images are submitted. Uterus is not visualized consistent with patient's history of previous partial hysterectomy. Right  ovary is not visualized. Patient provides a history of previous right oophorectomy. Left ovary measures 3.4 x 3.9 x 2.9 cm in size. Multiple left ovarian follicles are visualized. Small left ovarian cyst is visualized measuring 1.8 cm in diameter. Left ovarian blood flow is present. Trace amount of pelvic free fluid is present adjacent to the vaginal cuff.  IMPRESSION:  1. Nonvisualization of the uterus and right ovary consistent with patient's surgical history.  2. Small 1.8 cm left ovarian cyst.  3. Trace amount of pelvic free fluid.    - MRI C-spine 9/26/22:  FINDINGS:  Reversal of the normal cervical lordotic curvature. The vertebral body heights and alignment are maintained throughout the cervical spine. No abnormal vertebral body marrow signal. Multilevel intervertebral disc desiccation.  Spinal cord is normal in caliber and demonstrates normal signal. The visualized portions of the cervicomedullary junction are unremarkable.  C2-C3: Normal intervertebral disc contour. No central canal or neural foraminal narrowing.  C3-C4: Intervertebral disc bulge narrows the ventral subarachnoid space. No central canal or neural foraminal narrowing.  C4-C5: Intervertebral disc bulge eccentric to the right side. There is narrowing of the ventral subarachnoid space. The central canal is borderline measuring 10 mm. Moderate right and mild left neural foraminal narrowing.   C5-C6: Intervertebral disc bulge with superimposed left paracentral disc protrusion. There is narrowing of the ventral subarachnoid space and left subarticular recess. Bilateral uncovertebral and facet  hypertrophy. Moderate bilateral neural foraminal narrowing. The central canal is narrowed to 8 mm.  C6-C7: Intervertebral disc bulge narrows the ventral subarachnoid space. The central canal is narrowed to 9 mm. No neural foraminal narrowing.  C7-T1: Normal intervertebral disc contour. No central canal or neural foraminal narrowing.  The paravertebral soft tissues are unremarkable.  IMPRESSION:  Multilevel cervical spondylosis most prominent at C4-7. The findings are not significantly changed when compared to the prior exam.    - MRI Lumbar spine 8/17/21:  FINDINGS:  Alignment: Normal.  Vertebral bodies: Normal height and marrow signal.  T12-L1: No significant spinal canal stenosis or neuroforaminal narrowing.  L1-L2: No significant spinal canal stenosis or neuroforaminal narrowing.  L2-L3: No significant spinal canal stenosis or neuroforaminal narrowing.  L3-L4: No significant spinal canal stenosis or neuroforaminal narrowing.  L4-L5: No significant spinal canal stenosis or neuroforaminal narrowing.  L5-S1: No significant spinal canal stenosis or neuroforaminal narrowing.  Spinal cord: The cord extends down to the L1 level. The cord has a normal size, configuration and signal pattern.    - MRI Thoracic spine w/o 6/15/20:  FINDINGS:  Vertebral bodies: Normal vertebral body height, alignment and marrow signal.  C7-T1: The disc is normal. The central canal and neural foramen appear normal. No displacement or compression of the neural elements are seen.  T1-T2: The disc has a normal contour. The central canal and neural foramen appear normal. No displacement or compression of the neural elements are seen.  T2-T3: The disc has a normal contour. The central canal and neural foramen appear normal. No displacement or compression of the neural elements are seen.  T3-T4: The disc has a normal contour. The central canal and neural foramen appear normal. No displacement or compression of the neural elements are seen.  T4-T5:  The disc has a normal contour. The central canal and neural foramen appear normal. No displacement or compression of the neural elements are seen.  T5-T6: The disc has a normal contour. The central canal and neural foramen appear normal. No displacement or compression of the neural elements are seen.  T6-T7: The disc has a normal contour. The central canal and neural foramen appear normal. No displacement or compression of the neural elements are seen.  T7-T8: The disc has a normal contour. The central canal and neural foramen appear normal. No displacement or compression of the neural elements are seen.  T8-T9: The disc has a normal contour. The central canal and neural foramen appear normal. No displacement or compression of the neural elements are seen.  T9-T10: The disc has a normal contour. The central canal and neural foramen appear normal. No displacement or compression of the neural elements are seen.  T10-T11: The disc has a normal contour. The central canal and neural foramen appear normal. No displacement or compression of the neural elements are seen.  T11-T12: The disc has a normal contour. The central canal and neural foramen appear normal. No displacement or compression of the neural elements are seen.  T12-L1: The disc has a normal contour. The central canal and neural foramen appear normal. No displacement or compression of the neural elements are seen.  Spinal cord: The cord has a normal size, configuration and signal pattern.  Additional findings: None seen.      Labs:  BMP  Lab Results   Component Value Date     08/17/2022    K 5.4 (H) 08/17/2022     08/17/2022    CO2 28 08/17/2022    BUN 16.5 08/17/2022    CREATININE 0.80 08/17/2022    CALCIUM 10.3 (H) 08/17/2022    ANIONGAP 9.0 08/17/2022     Lab Results   Component Value Date    AST 18 08/17/2022     Lab Results   Component Value Date     08/17/2022       Assessment:  Margareth Echeverria is a 43 y.o. female with the following  diagnoses based on history, exam, and imaging:    Problem List Items Addressed This Visit          Psychiatric    Situational anxiety    Relevant Medications    diazePAM (VALIUM) 5 MG tablet       GI    Pelvic pain    Relevant Orders    X-Ray Pelvis Complete min 3 views (Completed)    X-Ray Lumbar Spine AP And Lateral (Completed)       Orthopedic    Chronic SI joint pain    Relevant Orders    X-Ray Pelvis Complete min 3 views (Completed)    X-Ray Lumbar Spine AP And Lateral (Completed)    Ambulatory referral/consult to Physical/Occupational Therapy    Chronic bilateral low back pain without sciatica    Relevant Orders    Ambulatory referral/consult to Physical/Occupational Therapy    MRI Lumbar Spine Without Contrast     Other Visit Diagnoses       Endometriosis, unspecified    -  Primary    Relevant Orders    MRI Pelvis Without Contrast    Decreased range of motion of lumbar spine        Relevant Orders    Ambulatory referral/consult to Physical/Occupational Therapy    MRI Lumbar Spine Without Contrast    Coccydynia                This is a pleasant 43 y.o. lady presenting with:     - Chronic pelvic pain: Pain appears multifactorial and she has a complex history with prior hysterectomy for endometriosis, interstitial cystitis, b/l oophorectomy for ovarian cyst, prior bilateral inguinal hernia repair and hypophosphatasia. Unclear exactly what the cause of the pain is at this point as she has multiple areas of pain.   - Chronic bilateral low back pain: Pain appears primarily due to SIJ dysfunction on exam, but may have facet and myofascial contributions as well.   - History of fibromyalgia, but does not present with widespread pain today as localized to pelvis, so unclear if this diagnosis is accurate.   - Comorbidities: Depression. ADHD. Hypophosphatasia. H/o Trigeminal neuralgia. IBS w/ constipation. H/o AV block. Former smoker. Allergy to opioids.     Treatment Plan:   - PT/OT/HEP: Refer to PT for her SIJ pain and  back pain in addition to her pelvic floor therapy.   - Procedures:    - Consider b/l SIJ CSI if no relief with conservative measures   - Consider b/l superior hypogastric plexus block for chronic pelvic pain   - Consider ganglion impar and sacrococcygeal injection for coccydynia  - Medications: No changes recommended at this time.  - Imaging: Reviewed. Considering her h/o hypophosphatasia will order pelvic MRI to r/o sacral fracture. Considering her reported h/o weight loss, night sweats, will order lumbar MRI to r/o malignancy.   - Labs: Reviewed.  Medications are appropriately dosed for current hepatorenal function.    Follow Up: RTC in 2-3 months or sooner PRN    I spent greater than 60 minutes in total in todays visit including face-to-face time with the patient, and time reviewing records/imaging/labs, and documenting.       Katharine Steve M.D.  Interventional Pain Medicine / Physical Medicine & Rehabilitation

## 2023-06-08 ENCOUNTER — OFFICE VISIT (OUTPATIENT)
Dept: PAIN MEDICINE | Facility: CLINIC | Age: 43
End: 2023-06-08
Payer: COMMERCIAL

## 2023-06-08 VITALS
RESPIRATION RATE: 16 BRPM | BODY MASS INDEX: 20.14 KG/M2 | WEIGHT: 118 LBS | DIASTOLIC BLOOD PRESSURE: 70 MMHG | OXYGEN SATURATION: 99 % | SYSTOLIC BLOOD PRESSURE: 117 MMHG | HEIGHT: 64 IN | HEART RATE: 76 BPM

## 2023-06-08 DIAGNOSIS — F41.8 SITUATIONAL ANXIETY: ICD-10-CM

## 2023-06-08 DIAGNOSIS — G89.29 CHRONIC SI JOINT PAIN: ICD-10-CM

## 2023-06-08 DIAGNOSIS — M53.86 DECREASED RANGE OF MOTION OF LUMBAR SPINE: ICD-10-CM

## 2023-06-08 DIAGNOSIS — M53.3 COCCYDYNIA: ICD-10-CM

## 2023-06-08 DIAGNOSIS — R10.2 PELVIC PAIN: ICD-10-CM

## 2023-06-08 DIAGNOSIS — M53.3 CHRONIC SI JOINT PAIN: ICD-10-CM

## 2023-06-08 DIAGNOSIS — M54.50 CHRONIC BILATERAL LOW BACK PAIN WITHOUT SCIATICA: ICD-10-CM

## 2023-06-08 DIAGNOSIS — G89.29 CHRONIC BILATERAL LOW BACK PAIN WITHOUT SCIATICA: ICD-10-CM

## 2023-06-08 DIAGNOSIS — N80.9 ENDOMETRIOSIS, UNSPECIFIED: Primary | ICD-10-CM

## 2023-06-08 PROCEDURE — 3074F PR MOST RECENT SYSTOLIC BLOOD PRESSURE < 130 MM HG: ICD-10-PCS | Mod: CPTII,S$GLB,, | Performed by: PHYSICAL MEDICINE & REHABILITATION

## 2023-06-08 PROCEDURE — 3008F PR BODY MASS INDEX (BMI) DOCUMENTED: ICD-10-PCS | Mod: CPTII,S$GLB,, | Performed by: PHYSICAL MEDICINE & REHABILITATION

## 2023-06-08 PROCEDURE — 1159F PR MEDICATION LIST DOCUMENTED IN MEDICAL RECORD: ICD-10-PCS | Mod: CPTII,S$GLB,, | Performed by: PHYSICAL MEDICINE & REHABILITATION

## 2023-06-08 PROCEDURE — 1160F RVW MEDS BY RX/DR IN RCRD: CPT | Mod: CPTII,S$GLB,, | Performed by: PHYSICAL MEDICINE & REHABILITATION

## 2023-06-08 PROCEDURE — 3074F SYST BP LT 130 MM HG: CPT | Mod: CPTII,S$GLB,, | Performed by: PHYSICAL MEDICINE & REHABILITATION

## 2023-06-08 PROCEDURE — 3078F PR MOST RECENT DIASTOLIC BLOOD PRESSURE < 80 MM HG: ICD-10-PCS | Mod: CPTII,S$GLB,, | Performed by: PHYSICAL MEDICINE & REHABILITATION

## 2023-06-08 PROCEDURE — 3078F DIAST BP <80 MM HG: CPT | Mod: CPTII,S$GLB,, | Performed by: PHYSICAL MEDICINE & REHABILITATION

## 2023-06-08 PROCEDURE — 3008F BODY MASS INDEX DOCD: CPT | Mod: CPTII,S$GLB,, | Performed by: PHYSICAL MEDICINE & REHABILITATION

## 2023-06-08 PROCEDURE — 1160F PR REVIEW ALL MEDS BY PRESCRIBER/CLIN PHARMACIST DOCUMENTED: ICD-10-PCS | Mod: CPTII,S$GLB,, | Performed by: PHYSICAL MEDICINE & REHABILITATION

## 2023-06-08 PROCEDURE — 99205 OFFICE O/P NEW HI 60 MIN: CPT | Mod: S$GLB,,, | Performed by: PHYSICAL MEDICINE & REHABILITATION

## 2023-06-08 PROCEDURE — 1159F MED LIST DOCD IN RCRD: CPT | Mod: CPTII,S$GLB,, | Performed by: PHYSICAL MEDICINE & REHABILITATION

## 2023-06-08 PROCEDURE — 99205 PR OFFICE/OUTPT VISIT, NEW, LEVL V, 60-74 MIN: ICD-10-PCS | Mod: S$GLB,,, | Performed by: PHYSICAL MEDICINE & REHABILITATION

## 2023-06-08 RX ORDER — DIAZEPAM 5 MG/1
5 TABLET ORAL ONCE
Qty: 1 TABLET | Refills: 0 | Status: SHIPPED | OUTPATIENT
Start: 2023-06-08 | End: 2023-07-20

## 2023-06-12 ENCOUNTER — PATIENT MESSAGE (OUTPATIENT)
Dept: PAIN MEDICINE | Facility: CLINIC | Age: 43
End: 2023-06-12
Payer: COMMERCIAL

## 2023-06-12 ENCOUNTER — TELEPHONE (OUTPATIENT)
Dept: PAIN MEDICINE | Facility: CLINIC | Age: 43
End: 2023-06-12
Payer: COMMERCIAL

## 2023-06-14 ENCOUNTER — TELEPHONE (OUTPATIENT)
Dept: PAIN MEDICINE | Facility: CLINIC | Age: 43
End: 2023-06-14
Payer: COMMERCIAL

## 2023-06-14 DIAGNOSIS — G89.29 CHRONIC SI JOINT PAIN: Primary | ICD-10-CM

## 2023-06-14 DIAGNOSIS — M53.3 CHRONIC SI JOINT PAIN: Primary | ICD-10-CM

## 2023-06-14 RX ORDER — ETODOLAC 300 MG/1
300 CAPSULE ORAL 2 TIMES DAILY PRN
Qty: 60 CAPSULE | Refills: 0 | Status: SHIPPED | OUTPATIENT
Start: 2023-06-14 | End: 2023-07-12

## 2023-06-14 NOTE — TELEPHONE ENCOUNTER
Glasses Rx given. I am going to send her in etodolac 300 mg BID PRN pain and inflammation. Please advise her to review medication side effects on information sheet provided from pharmacy as we have not discussed together.

## 2023-06-14 NOTE — TELEPHONE ENCOUNTER
Spoke wtih patient,she is unable to participate in PT due to pain being too severe. Procedure scheduled for 6-23-23  Bilateral SIJ injections. Patient has requested somethin additional for pain relief.

## 2023-06-16 ENCOUNTER — PATIENT MESSAGE (OUTPATIENT)
Dept: PAIN MEDICINE | Facility: CLINIC | Age: 43
End: 2023-06-16
Payer: COMMERCIAL

## 2023-06-21 ENCOUNTER — PATIENT MESSAGE (OUTPATIENT)
Dept: PAIN MEDICINE | Facility: CLINIC | Age: 43
End: 2023-06-21
Payer: COMMERCIAL

## 2023-06-21 DIAGNOSIS — G89.29 CHRONIC BILATERAL LOW BACK PAIN WITHOUT SCIATICA: Primary | ICD-10-CM

## 2023-06-21 DIAGNOSIS — M54.50 CHRONIC BILATERAL LOW BACK PAIN WITHOUT SCIATICA: Primary | ICD-10-CM

## 2023-06-21 DIAGNOSIS — M51.36 DDD (DEGENERATIVE DISC DISEASE), LUMBAR: ICD-10-CM

## 2023-06-23 ENCOUNTER — OUTSIDE PLACE OF SERVICE (OUTPATIENT)
Dept: PAIN MEDICINE | Facility: CLINIC | Age: 43
End: 2023-06-23

## 2023-06-23 PROCEDURE — 27096 PR INJECTION,SACROILIAC JOINT: ICD-10-PCS | Mod: 50,,, | Performed by: PHYSICAL MEDICINE & REHABILITATION

## 2023-06-23 PROCEDURE — 27096 INJECT SACROILIAC JOINT: CPT | Mod: 50,,, | Performed by: PHYSICAL MEDICINE & REHABILITATION

## 2023-06-27 ENCOUNTER — PATIENT MESSAGE (OUTPATIENT)
Dept: PAIN MEDICINE | Facility: CLINIC | Age: 43
End: 2023-06-27
Payer: COMMERCIAL

## 2023-06-27 DIAGNOSIS — L53.9 ERYTHEMA AT INJECTION SITE: Primary | ICD-10-CM

## 2023-06-27 RX ORDER — MUPIROCIN 20 MG/G
OINTMENT TOPICAL 3 TIMES DAILY
Qty: 15 G | Refills: 0 | Status: SHIPPED | OUTPATIENT
Start: 2023-06-27 | End: 2023-07-20

## 2023-06-27 NOTE — TELEPHONE ENCOUNTER
Please let her know that I am going to send her in mupirocin to apply to the site three times daily and lets see her in clinic on Thursday at 8:30 AM.    Thanks,  KP

## 2023-06-29 ENCOUNTER — OFFICE VISIT (OUTPATIENT)
Dept: PAIN MEDICINE | Facility: CLINIC | Age: 43
End: 2023-06-29
Payer: COMMERCIAL

## 2023-06-29 VITALS
DIASTOLIC BLOOD PRESSURE: 78 MMHG | OXYGEN SATURATION: 98 % | BODY MASS INDEX: 19.63 KG/M2 | RESPIRATION RATE: 16 BRPM | SYSTOLIC BLOOD PRESSURE: 112 MMHG | WEIGHT: 115 LBS | HEIGHT: 64 IN | HEART RATE: 64 BPM

## 2023-06-29 DIAGNOSIS — G89.29 CHRONIC SI JOINT PAIN: ICD-10-CM

## 2023-06-29 DIAGNOSIS — M53.3 COCCYDYNIA: ICD-10-CM

## 2023-06-29 DIAGNOSIS — G89.29 CHRONIC BILATERAL LOW BACK PAIN WITHOUT SCIATICA: ICD-10-CM

## 2023-06-29 DIAGNOSIS — M53.3 CHRONIC SI JOINT PAIN: ICD-10-CM

## 2023-06-29 DIAGNOSIS — M77.41 METATARSALGIA OF BOTH FEET: Primary | ICD-10-CM

## 2023-06-29 DIAGNOSIS — M54.50 CHRONIC BILATERAL LOW BACK PAIN WITHOUT SCIATICA: ICD-10-CM

## 2023-06-29 DIAGNOSIS — R10.2 PELVIC PAIN: ICD-10-CM

## 2023-06-29 DIAGNOSIS — L53.9 ERYTHEMA AT INJECTION SITE: ICD-10-CM

## 2023-06-29 DIAGNOSIS — M77.42 METATARSALGIA OF BOTH FEET: Primary | ICD-10-CM

## 2023-06-29 PROCEDURE — 3008F BODY MASS INDEX DOCD: CPT | Mod: CPTII,S$GLB,, | Performed by: PHYSICAL MEDICINE & REHABILITATION

## 2023-06-29 PROCEDURE — 99214 PR OFFICE/OUTPT VISIT, EST, LEVL IV, 30-39 MIN: ICD-10-PCS | Mod: S$GLB,,, | Performed by: PHYSICAL MEDICINE & REHABILITATION

## 2023-06-29 PROCEDURE — 3074F SYST BP LT 130 MM HG: CPT | Mod: CPTII,S$GLB,, | Performed by: PHYSICAL MEDICINE & REHABILITATION

## 2023-06-29 PROCEDURE — 3078F DIAST BP <80 MM HG: CPT | Mod: CPTII,S$GLB,, | Performed by: PHYSICAL MEDICINE & REHABILITATION

## 2023-06-29 PROCEDURE — 1160F RVW MEDS BY RX/DR IN RCRD: CPT | Mod: CPTII,S$GLB,, | Performed by: PHYSICAL MEDICINE & REHABILITATION

## 2023-06-29 PROCEDURE — 1159F MED LIST DOCD IN RCRD: CPT | Mod: CPTII,S$GLB,, | Performed by: PHYSICAL MEDICINE & REHABILITATION

## 2023-06-29 PROCEDURE — 1160F PR REVIEW ALL MEDS BY PRESCRIBER/CLIN PHARMACIST DOCUMENTED: ICD-10-PCS | Mod: CPTII,S$GLB,, | Performed by: PHYSICAL MEDICINE & REHABILITATION

## 2023-06-29 PROCEDURE — 3008F PR BODY MASS INDEX (BMI) DOCUMENTED: ICD-10-PCS | Mod: CPTII,S$GLB,, | Performed by: PHYSICAL MEDICINE & REHABILITATION

## 2023-06-29 PROCEDURE — 99214 OFFICE O/P EST MOD 30 MIN: CPT | Mod: S$GLB,,, | Performed by: PHYSICAL MEDICINE & REHABILITATION

## 2023-06-29 PROCEDURE — 3074F PR MOST RECENT SYSTOLIC BLOOD PRESSURE < 130 MM HG: ICD-10-PCS | Mod: CPTII,S$GLB,, | Performed by: PHYSICAL MEDICINE & REHABILITATION

## 2023-06-29 PROCEDURE — 1159F PR MEDICATION LIST DOCUMENTED IN MEDICAL RECORD: ICD-10-PCS | Mod: CPTII,S$GLB,, | Performed by: PHYSICAL MEDICINE & REHABILITATION

## 2023-06-29 PROCEDURE — 3078F PR MOST RECENT DIASTOLIC BLOOD PRESSURE < 80 MM HG: ICD-10-PCS | Mod: CPTII,S$GLB,, | Performed by: PHYSICAL MEDICINE & REHABILITATION

## 2023-06-29 NOTE — PROGRESS NOTES
[facility]      Chief Complaint:   Chief Complaint   Patient presents with    Urticaria       History of Present Illness: Margareth Echeverria is a 43 y.o. female referred by Dr. Nelda Thornton for Pelvic pain.      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 and she has a history of chronic pelvic pain and LBP with prior hysterectomy and b/l oophorectomy and she was feeling pretty good in regards to her chronic pelvic pain until Mother's day where she developed rather sudden increase in her pelvic pain with no clear inciting event  Location: involves her entire pelvis, but seems to be primarily localized over her sacrum area  Radiation: She feels some radiation into her anterior thighs  Timing: constant  Quality: Aching, Throbbing, Pounding, Deep, Stabbing, and nagging and swelling  Exacerbating Factors: Bowel movements, valsalva, intercourse, sitting, standing prolonged  Alleviating Factors: nothing  Associated Symptoms: She has neuropathy in her legs/feet from prior to this incident. She feels weakness in her legs. She has chronic constipation, but has been having daily BMs. She does note recent weight loss (states about 30 lbs). She does note night sweats. She denies fevers, urinary incontinence/change in function and bowel incontinence/change in function    Nothing seems to be helping, she has tried heating, cold. She saw gynecology and was sent here and to pelvic floor rehab.     She denies aggravation with urination.     Severity: Currently: 8/10   Typical Range: 5-10/10     Exacerbation: 10/10     P = 8  E = 10  G = 10  Baseline PEG Score = 9.33        Interval History (06/29/2023):  Margareth Echeverria returns today for follow up.  At the last clinic visit, scheduled SIJ.    SIJ provided > 50% relief. However, the area of the needle insertion was slightly erythematous. She went swimming the day after the injection and suspects that may have contributed.  She rubbed her eczema cream on it and it seemed to help, but still slightly red. She denies fevers, chills, drainage.     Currently, the back pain is improved.  She has had some insomnia since the steroid injection. She does note some radiating pains into the legs that     Etodolac was sent in since last visit, and she finds that helps as well.     She is mostly noting bilateral feet pain today. The pain is primarily in the metatarsal area, but can radiate up her ankles at times. Pain seems pretty constant, but worse with walking. She states she has been diagnosed with neuropathy in the past, but this pain seems different.     Current Pain Scales:  Current: 5/10              Typical Range: 3-7/10    Current PEG Score: 4.33    Opioid Risk Score         Value Time User    Opioid Risk Score  7 6/8/2023 10:19 AM Katharine Steve MD             Previous Interventions:  - 6/23/23: B/L SIJ CSI w/ > 50% relief in pain and improved fxn.   - Procedures for neck and back pain, but not for pelvic pain    Previous Therapies:  PT/OT: yes for back pain but not pelvic floor  Relevant Surgery: yes    - surgery for trigeminal neuralgia  Previous Medications:   - NSAIDS:   - Muscle Relaxants: Zanaflex 2mg   - TCAs:   - SNRIs: cymbalta  - Topicals: CBD  - Anticonvulsants:    - Opioids: hydrocodone    Current Pain Medications:  Cymbalta 30mg  Norco 10/325mg   Tizanidine 2 mg     Blood Thinners: No    Full Medication List:    Current Outpatient Medications:     DULoxetine (CYMBALTA) 30 MG capsule, , Disp: , Rfl:     estradioL (VIVELLE-DOT) 0.1 mg/24 hr PTSW, Place 1 patch onto the skin twice a week., Disp: 24 patch, Rfl: 4    etodolac (LODINE) 300 MG Cap, Take 1 capsule (300 mg total) by mouth 2 (two) times daily as needed (pain and inflammation)., Disp: 60 capsule, Rfl: 0    HYDROcodone-acetaminophen (NORCO)  mg per tablet, , Disp: , Rfl:     levocetirizine (XYZAL) 5 MG tablet, , Disp: , Rfl:     mupirocin (BACTROBAN) 2 % ointment,  Apply topically 3 (three) times daily., Disp: 15 g, Rfl: 0    NP THYROID 30 mg Tab, , Disp: , Rfl:     ondansetron (ZOFRAN) 4 MG tablet, TAKE 1 TABLET BY MOUTH EVERY 6 HOURS FOR 10 DAYS, Disp: , Rfl:     progesterone (PROMETRIUM) 200 MG capsule, , Disp: , Rfl:     promethazine (PHENERGAN) 25 MG tablet, , Disp: , Rfl:     SYMPROIC 0.2 mg Tab, , Disp: , Rfl:     tiZANidine (ZANAFLEX) 2 MG tablet, , Disp: , Rfl:     diazePAM (VALIUM) 5 MG tablet, Take 1 tablet (5 mg total) by mouth once. 45 minutes to 1 hour prior to MRI for procedural anxiety for 1 dose, Disp: 1 tablet, Rfl: 0    duloxetine HCl (CYMBALTA ORAL), , Disp: , Rfl:     lisdexamfetamine (VYVANSE) 20 MG capsule, Take 1 capsule (20 mg total) by mouth every morning., Disp: 30 capsule, Rfl: 0    methylPREDNISolone (MEDROL DOSEPACK) 4 mg tablet, use as directed (Patient not taking: Reported on 5/22/2023), Disp: 1 each, Rfl: 0    progesterone (PROMETRIUM) 100 MG capsule, , Disp: , Rfl:      Review of Systems:  ROS    Allergies:  Opioids - morphine analogues, Bee pollen, Hydrocodone, Morphine, and Tramadol     Medical History:   has a past medical history of Adult hypophosphatasia, AV block, Fibromyalgia, IBS (irritable bowel syndrome), IC (interstitial cystitis), Occipital neuralgia, and Trigeminal neuralgia.    Surgical History:   has a past surgical history that includes Inguinal hernia repair (Bilateral); Laparoscopic cholecystectomy; Augmentation of breast; Fulguration of endometriosis (07/05/2016); Laparoscopy-assisted vaginal hysterectomy (11/11/2019); Laparoscopy (12/08/2019); Appendectomy; Cystoscopy with hydrodistension and biopsy of bladder (12/28/2020); Removal of breast implant (04/2021); and Hysterectomy.    Family History:  family history includes Breast cancer in her maternal cousin; Lung cancer in her paternal grandmother; No Known Problems in her father and mother.    Social History:   reports that she quit smoking about 11 years ago. Her smoking  "use included cigarettes. She has a 5.00 pack-year smoking history. She has never been exposed to tobacco smoke. She has never used smokeless tobacco. She reports current alcohol use. She reports current drug use. Drug: Marijuana.    Physical Exam:  /78   Pulse 64   Resp 16   Ht 5' 4" (1.626 m)   Wt 52.2 kg (115 lb)   SpO2 98%   BMI 19.74 kg/m²   GEN: No acute distress. Calm, comfortable  HENT: Normocephalic, atraumatic, moist mucous membranes  EYE: Anicteric sclera, non-injected.   CV: Non-diaphoretic. Regular Rate. Radial Pulses 2+.  RESP: Breathing comfortably. Chest expansion symmetric.  EXT: No clubbing, cyanosis.   SKIN: Warm, & dry to palpation. No visible rashes or lesions of exposed skin.   PSYCH: Pleasant mood and appropriate affect. Recent and remote memory intact.   GAIT: Independent, normal ambulation  Lumbar Spine Exam:       Inspection: No bruising, swelling. Slight erythema noted at bilateral injection sites.        Palpation: No calor to the area. (+) TTP of b/l SIJ > lumbar paraspinals b/l. (+) TTP of sacrum and coccyx         ROM:  Limited in flexion, extension, lateral bending.       (+) Facet loading bilaterally      (-) Straight Leg Raise bilaterally      (+) ISABELLA bilaterally      (+) SIJ Compression Test bilaterally      (+) Gaenslan's Test bilaterally      (+) Thigh thrust/Posterior Pelvic Pain Provocation Test bilaterally      (+) Sacral thrust  Hip/Pelvis Exam:      Inspection: No gross deformity or apparent leg length discrepancy      Palpation: (+) TTP along inguinal ligaments b/l, pubic symphysis. (+) TTP right ischial bursa and b/l piriformis and gluteal muscles, (-) TTP to bilateral greater trochanteric bursas.       ROM:  No limitation or pain in internal rotation, external rotation b/l  Ankle Exam:     Inspection: No swelling, bruising or deformity. Slight pes cavus b/l      Palpation: (+) TTP to bilateral metarsal area and right medial heel.      ROM: ADF to 15 degrees " b/l w/ knees flexed and 8 degrees b/l w/ knees extended     Provocative Tests:  (-) Anterior Drawer        (-) Talar Tilt   (+) Metatarsal squeeze on right, but neg on left     Dorsalis pedis pulse intact b/l  Neurologic Exam:     Alert. Speech is fluent and appropriate.     Strength: 4/5 diffusely in the left leg, otherwise 5/5 throughout bilateral lower extremities     Sensation:  Grossly intact to light touch in bilateral lower extremities     Reflexes: 2+ in b/l patella, achilles     Tone: No abnormality appreciated in bilateral lower extremities     No Clonus     Downgoing toes on plantar stimulation     (-) Franco bilaterally                Imaging:  - MRI L-spine 6/13/23:  Vertebral body alignment is preserved. Vertebral body heights are maintained. Bone marrow signal is within normal limits. The conus medullaris terminates normally at the level of L2. Lumbar nerve roots have normal appearance. Preserved intervertebral disc space height and signal.    T12-L1: No significant canal or foraminal stenosis.    L1-L2: No significant canal or foraminal stenosis.    L2-L3: No significant canal or foraminal stenosis.    L3-L4: No significant canal or foraminal stenosis.    L4-L5: Broad-based disc bulge, ligamentum flavum thickening, facet hypertrophy with at most mild canal stenosis. Mild effacement of the lateral recesses. No significant foraminal stenosis.    L5-S1: No significant canal or foraminal stenosis.        - MRI Pelvis w.o 6/13/23:  The femoral heads are well formed and seated within well formed acetabula. Bone marrow signal is normal. The sacroiliac joints are intact. There is no muscle atrophy or edema. There is no intrapelvic visceral abnormalities. Prior hysterectomy. There is no evidence of fluid collection. There is mild right hamstring origin tendinosis. The gluteus medius and minimus tendon insertions are intact. The hip adductors are intact. There is no evidence of iliopsoas or trochanteric  bursitis.    Impression:    Mild right hamstring origin tendinosis. Otherwise, negative pelvic MRI.      - X-ray lumbar spine 6/8/23:  Transitional anatomy with sacralization of L5.  Vertebral body heights maintained.  No spondylolisthesis.  Disc spaces maintained.  Mild facet arthropathy.  No acute osseous abnormality.  Bilateral SI joint degenerative changes.  Constipation suspected.    - X-ray pelvis 6/8/23:  Hip joint spaces maintained.  Likely transitional anatomy with L5 sacralization.  Symmetric degenerative findings of the bilateral SI joints.  Constipation findings noted.    - Pelvis CT w/o contrast 1/6/23:  The visualized gastrointestinal tract appears normal.  Urinary bladder is normal.  Inguinal regions are normal.  No masses, fluid collections or adenopathy seen.  1. There is a cystic area on the left side of the pelvis measuring about 5.8 cm. This has a simple cystic appearance to it. Statistically this most likely represents a ovarian cyst. The patient's uterus has apparently beenremoved.  The bony elements appear intact.  Impression:  1. Cyst on the left side of the pelvis. Statistically most likely represents an ovarian cyst. Evaluation with pelvic ultrasound is recommended.    - Pelvic US 12/21/22:  Transabdominal and transvaginal pelvic ultrasound was performed by the sonographer. Static images are submitted. Uterus is not visualized consistent with patient's history of previous partial hysterectomy. Right  ovary is not visualized. Patient provides a history of previous right oophorectomy. Left ovary measures 3.4 x 3.9 x 2.9 cm in size. Multiple left ovarian follicles are visualized. Small left ovarian cyst is visualized measuring 1.8 cm in diameter. Left ovarian blood flow is present. Trace amount of pelvic free fluid is present adjacent to the vaginal cuff.  IMPRESSION:  1. Nonvisualization of the uterus and right ovary consistent with patient's surgical history.  2. Small 1.8 cm left ovarian  cyst.  3. Trace amount of pelvic free fluid.    - MRI C-spine 9/26/22:  FINDINGS:  Reversal of the normal cervical lordotic curvature. The vertebral body heights and alignment are maintained throughout the cervical spine. No abnormal vertebral body marrow signal. Multilevel intervertebral disc desiccation.  Spinal cord is normal in caliber and demonstrates normal signal. The visualized portions of the cervicomedullary junction are unremarkable.  C2-C3: Normal intervertebral disc contour. No central canal or neural foraminal narrowing.  C3-C4: Intervertebral disc bulge narrows the ventral subarachnoid space. No central canal or neural foraminal narrowing.  C4-C5: Intervertebral disc bulge eccentric to the right side. There is narrowing of the ventral subarachnoid space. The central canal is borderline measuring 10 mm. Moderate right and mild left neural foraminal narrowing.   C5-C6: Intervertebral disc bulge with superimposed left paracentral disc protrusion. There is narrowing of the ventral subarachnoid space and left subarticular recess. Bilateral uncovertebral and facet hypertrophy. Moderate bilateral neural foraminal narrowing. The central canal is narrowed to 8 mm.  C6-C7: Intervertebral disc bulge narrows the ventral subarachnoid space. The central canal is narrowed to 9 mm. No neural foraminal narrowing.  C7-T1: Normal intervertebral disc contour. No central canal or neural foraminal narrowing.  The paravertebral soft tissues are unremarkable.  IMPRESSION:  Multilevel cervical spondylosis most prominent at C4-7. The findings are not significantly changed when compared to the prior exam.    - MRI Lumbar spine 8/17/21:  FINDINGS:  Alignment: Normal.  Vertebral bodies: Normal height and marrow signal.  T12-L1: No significant spinal canal stenosis or neuroforaminal narrowing.  L1-L2: No significant spinal canal stenosis or neuroforaminal narrowing.  L2-L3: No significant spinal canal stenosis or neuroforaminal  narrowing.  L3-L4: No significant spinal canal stenosis or neuroforaminal narrowing.  L4-L5: No significant spinal canal stenosis or neuroforaminal narrowing.  L5-S1: No significant spinal canal stenosis or neuroforaminal narrowing.  Spinal cord: The cord extends down to the L1 level. The cord has a normal size, configuration and signal pattern.    - MRI Thoracic spine w/o 6/15/20:  FINDINGS:  Vertebral bodies: Normal vertebral body height, alignment and marrow signal.  C7-T1: The disc is normal. The central canal and neural foramen appear normal. No displacement or compression of the neural elements are seen.  T1-T2: The disc has a normal contour. The central canal and neural foramen appear normal. No displacement or compression of the neural elements are seen.  T2-T3: The disc has a normal contour. The central canal and neural foramen appear normal. No displacement or compression of the neural elements are seen.  T3-T4: The disc has a normal contour. The central canal and neural foramen appear normal. No displacement or compression of the neural elements are seen.  T4-T5: The disc has a normal contour. The central canal and neural foramen appear normal. No displacement or compression of the neural elements are seen.  T5-T6: The disc has a normal contour. The central canal and neural foramen appear normal. No displacement or compression of the neural elements are seen.  T6-T7: The disc has a normal contour. The central canal and neural foramen appear normal. No displacement or compression of the neural elements are seen.  T7-T8: The disc has a normal contour. The central canal and neural foramen appear normal. No displacement or compression of the neural elements are seen.  T8-T9: The disc has a normal contour. The central canal and neural foramen appear normal. No displacement or compression of the neural elements are seen.  T9-T10: The disc has a normal contour. The central canal and neural foramen appear normal.  No displacement or compression of the neural elements are seen.  T10-T11: The disc has a normal contour. The central canal and neural foramen appear normal. No displacement or compression of the neural elements are seen.  T11-T12: The disc has a normal contour. The central canal and neural foramen appear normal. No displacement or compression of the neural elements are seen.  T12-L1: The disc has a normal contour. The central canal and neural foramen appear normal. No displacement or compression of the neural elements are seen.  Spinal cord: The cord has a normal size, configuration and signal pattern.  Additional findings: None seen.      Labs:  BMP  Lab Results   Component Value Date     08/17/2022    K 5.4 (H) 08/17/2022     08/17/2022    CO2 28 08/17/2022    BUN 16.5 08/17/2022    CREATININE 0.80 08/17/2022    CALCIUM 10.3 (H) 08/17/2022    ANIONGAP 9.0 08/17/2022     Lab Results   Component Value Date    AST 18 08/17/2022     Lab Results   Component Value Date     08/17/2022       Assessment:  Margareth Echeverria is a 43 y.o. female with the following diagnoses based on history, exam, and imaging:    Problem List Items Addressed This Visit          GI    Pelvic pain       Orthopedic    Chronic SI joint pain    Chronic bilateral low back pain without sciatica     Other Visit Diagnoses       Metatarsalgia of both feet    -  Primary    Relevant Orders    Ambulatory referral/consult to Podiatry    Erythema at injection site        Coccydynia              This is a pleasant 43 y.o. lady presenting with:     - Bilateral feet pain: Pain primarily over metatarsal areas. Metatarsalgia vs peripheral neuropathy vs radicular pain from lumbar spine.   - Will refer to podiatry for further evaluation.   - Chronic bilateral low back pain: Pain appears primarily due to SIJ dysfunction on exam, but may have facet and myofascial contributions as well.    - Back pain improved after bilateral SIJ CSI   - She does  note some radiation into the legs in L4/5 distribution and MRI with mild canal stenosis at L4-5 with facet arthropathy  - Chronic pelvic pain: Pain appears multifactorial and she has a complex history with prior hysterectomy for endometriosis, interstitial cystitis, b/l oophorectomy for ovarian cyst, prior bilateral inguinal hernia repair and hypophosphatasia. Unclear exactly what the cause of the pain is at this point as she has multiple areas of pain.   - History of fibromyalgia, but does not present with widespread pain today as localized to pelvis and low back and feet, so unclear if this diagnosis is accurate.   - Comorbidities: Depression. ADHD. Hypophosphatasia. H/o Trigeminal neuralgia. IBS w/ constipation. H/o AV block. Former smoker. Allergy to opioids.     Treatment Plan:   - PT/OT/HEP: Encouraged her to start PT for her SIJ pain and back pain in addition to her pelvic floor therapy.   - Procedures:    - Consider repeat b/l SIJ CSI if back pain returns/worsens  - Consider L4-5 IL ALEKSANDR for radicular leg pains   - Consider b/l superior hypogastric plexus block for chronic pelvic pain   - Consider ganglion impar and sacrococcygeal injection for coccydynia  - Medications:   - Cont etodolac PRN  - Cont mupirocin to area for 2-3 more days.   - Imaging: Reviewed.   - Labs: Reviewed.  Medications are appropriately dosed for current hepatorenal function.    Follow Up: RTC in 3 months or sooner PRHAI Steve M.D.  Interventional Pain Medicine / Physical Medicine & Rehabilitation

## 2023-07-03 ENCOUNTER — TELEPHONE (OUTPATIENT)
Dept: PAIN MEDICINE | Facility: CLINIC | Age: 43
End: 2023-07-03
Payer: COMMERCIAL

## 2023-07-12 DIAGNOSIS — G89.29 CHRONIC SI JOINT PAIN: ICD-10-CM

## 2023-07-12 DIAGNOSIS — M53.3 CHRONIC SI JOINT PAIN: ICD-10-CM

## 2023-07-12 RX ORDER — ETODOLAC 300 MG/1
300 CAPSULE ORAL 2 TIMES DAILY PRN
Qty: 60 CAPSULE | Refills: 0 | Status: SHIPPED | OUTPATIENT
Start: 2023-07-12 | End: 2024-01-02

## 2023-07-20 ENCOUNTER — PATIENT MESSAGE (OUTPATIENT)
Dept: FAMILY MEDICINE | Facility: CLINIC | Age: 43
End: 2023-07-20

## 2023-07-20 ENCOUNTER — OFFICE VISIT (OUTPATIENT)
Dept: FAMILY MEDICINE | Facility: CLINIC | Age: 43
End: 2023-07-20
Payer: COMMERCIAL

## 2023-07-20 VITALS
SYSTOLIC BLOOD PRESSURE: 107 MMHG | BODY MASS INDEX: 20.49 KG/M2 | OXYGEN SATURATION: 99 % | WEIGHT: 120 LBS | HEIGHT: 64 IN | HEART RATE: 98 BPM | DIASTOLIC BLOOD PRESSURE: 68 MMHG

## 2023-07-20 DIAGNOSIS — F32.A ANXIETY AND DEPRESSION: ICD-10-CM

## 2023-07-20 DIAGNOSIS — F41.9 ANXIETY AND DEPRESSION: ICD-10-CM

## 2023-07-20 DIAGNOSIS — F98.8 ATTENTION DEFICIT DISORDER, UNSPECIFIED HYPERACTIVITY PRESENCE: ICD-10-CM

## 2023-07-20 DIAGNOSIS — R53.83 FATIGUE, UNSPECIFIED TYPE: Primary | ICD-10-CM

## 2023-07-20 DIAGNOSIS — R79.89 ABNORMAL THYROID BLOOD TEST: ICD-10-CM

## 2023-07-20 PROCEDURE — 3074F SYST BP LT 130 MM HG: CPT | Mod: CPTII,S$GLB,, | Performed by: NURSE PRACTITIONER

## 2023-07-20 PROCEDURE — 3074F PR MOST RECENT SYSTOLIC BLOOD PRESSURE < 130 MM HG: ICD-10-PCS | Mod: CPTII,S$GLB,, | Performed by: NURSE PRACTITIONER

## 2023-07-20 PROCEDURE — 99214 PR OFFICE/OUTPT VISIT, EST, LEVL IV, 30-39 MIN: ICD-10-PCS | Mod: S$GLB,,, | Performed by: NURSE PRACTITIONER

## 2023-07-20 PROCEDURE — 3078F PR MOST RECENT DIASTOLIC BLOOD PRESSURE < 80 MM HG: ICD-10-PCS | Mod: CPTII,S$GLB,, | Performed by: NURSE PRACTITIONER

## 2023-07-20 PROCEDURE — 99214 OFFICE O/P EST MOD 30 MIN: CPT | Mod: S$GLB,,, | Performed by: NURSE PRACTITIONER

## 2023-07-20 PROCEDURE — 3078F DIAST BP <80 MM HG: CPT | Mod: CPTII,S$GLB,, | Performed by: NURSE PRACTITIONER

## 2023-07-20 PROCEDURE — 3008F PR BODY MASS INDEX (BMI) DOCUMENTED: ICD-10-PCS | Mod: CPTII,S$GLB,, | Performed by: NURSE PRACTITIONER

## 2023-07-20 PROCEDURE — 3008F BODY MASS INDEX DOCD: CPT | Mod: CPTII,S$GLB,, | Performed by: NURSE PRACTITIONER

## 2023-07-20 RX ORDER — LEVOTHYROXINE, LIOTHYRONINE 38; 9 UG/1; UG/1
60 TABLET ORAL DAILY
COMMUNITY
Start: 2023-06-30 | End: 2023-09-13 | Stop reason: SDUPTHER

## 2023-07-20 RX ORDER — DULOXETIN HYDROCHLORIDE 60 MG/1
60 CAPSULE, DELAYED RELEASE ORAL DAILY
Qty: 30 CAPSULE | Refills: 11 | Status: SHIPPED | OUTPATIENT
Start: 2023-07-20 | End: 2024-01-23

## 2023-07-20 RX ORDER — SPIRONOLACTONE 50 MG/1
50 TABLET, FILM COATED ORAL DAILY
COMMUNITY
Start: 2023-07-14 | End: 2023-09-13 | Stop reason: SDUPTHER

## 2023-07-20 RX ORDER — DESONIDE 0.5 MG/G
15 CREAM TOPICAL DAILY
COMMUNITY
Start: 2023-06-28 | End: 2024-01-23

## 2023-07-20 NOTE — PROGRESS NOTES
Patient ID: Margareth Echeverria  MRN: [id number]    Chief Complaint: Other (Vyvanse refills)      Allergies: Patient is allergic to opioids - morphine analogues, bee pollen, morphine, and tramadol.     Smoking status:  Social History     Tobacco Use   Smoking Status Former    Packs/day: 0.50    Years: 10.00    Pack years: 5.00    Types: Cigarettes    Quit date: 2011    Years since quittin.6    Passive exposure: Never   Smokeless Tobacco Never       Problem List:  Patient Active Problem List   Diagnosis    Attention deficit hyperactivity disorder (ADHD), predominantly inattentive type    Pelvic pain    Chronic SI joint pain    Situational anxiety    Chronic bilateral low back pain without sciatica        Current Medications:  Current Outpatient Medications   Medication Instructions    desonide (DESOWEN) 15 g, Topical (Top), Daily    DULoxetine (CYMBALTA) 60 mg, Oral, Daily    etodolac (LODINE) 300 mg, Oral, 2 times daily PRN    HYDROcodone-acetaminophen (NORCO)  mg per tablet No dose, route, or frequency recorded.    levocetirizine (XYZAL) 5 MG tablet No dose, route, or frequency recorded.    lisdexamfetamine (VYVANSE) 20 mg, Oral, Every morning    NP THYROID 60 mg, Oral, Daily    ondansetron (ZOFRAN) 4 MG tablet TAKE 1 TABLET BY MOUTH EVERY 6 HOURS FOR 10 DAYS    progesterone (PROMETRIUM) 100 MG capsule No dose, route, or frequency recorded.    progesterone (PROMETRIUM) 200 MG capsule No dose, route, or frequency recorded.    promethazine (PHENERGAN) 25 MG tablet No dose, route, or frequency recorded.    spironolactone (ALDACTONE) 50 mg, Oral, Daily    SYMPROIC 0.2 mg Tab No dose, route, or frequency recorded.    tiZANidine (ZANAFLEX) 2 MG tablet No dose, route, or frequency recorded.       History of Present Illness:  The patient is a 43 y.o. White female who presents to clinic for evaluation and management with a chief complaint of Other (Vyvanse refills)     HPI Margareth is returning for f/up for ADD ,  "depression and anxiety. She is also seeing another provider for HRT . She has had a complete hysterectomy with her last ovary removed recently. She started taking [place] thyroid 60 mg recently and she has noticed feeling more anxious. She is asking my opinion of whether or not she should continue this medication . She is also reporting some returning sx of depression such as lack of motivation , feeling tired and feels she has social anxiety.     Review of Systems   Constitutional:  Positive for fatigue.   HENT: Negative.     Eyes: Negative.    Respiratory: Negative.     Cardiovascular: Negative.    Gastrointestinal: Negative.    Endocrine: Negative.    Genitourinary: Negative.    Musculoskeletal:  Positive for arthralgias, back pain and neck pain.   Integumentary:  Negative.   Allergic/Immunologic: Negative.    Neurological: Negative.    Psychiatric/Behavioral:  Positive for dysphoric mood. The patient is nervous/anxious.       Visit Vitals  /68 (BP Location: Left arm, Patient Position: Sitting, BP Method: Medium (Automatic))   Pulse 98   Ht 5' 4" (1.626 m)   Wt 54.4 kg (120 lb)   SpO2 99%   BMI 20.60 kg/m²       Physical Exam  Vitals and nursing note reviewed.   Constitutional:       Appearance: Normal appearance.   HENT:      Head: Normocephalic.   Eyes:      Conjunctiva/sclera: Conjunctivae normal.   Cardiovascular:      Rate and Rhythm: Normal rate and regular rhythm.   Pulmonary:      Effort: Pulmonary effort is normal.      Breath sounds: Normal breath sounds.   Musculoskeletal:         General: Normal range of motion.      Cervical back: Normal range of motion.   Skin:     General: Skin is warm and dry.   Neurological:      General: No focal deficit present.      Mental Status: She is alert.   Psychiatric:         Mood and Affect: Mood normal.         Behavior: Behavior normal.        Assessment & Plan:  1. Fatigue, unspecified type  -     Comprehensive Metabolic Panel; Future; Expected date: " 07/20/2023  -     CBC auto differential; Future; Expected date: 07/20/2023    2. Abnormal thyroid blood test  -     Lipid Panel; Future; Expected date: 07/20/2023  -     TSH w/reflex to Free T4; Future; Expected date: 07/20/2023  -     Thyroid peroxidase antibody; Future; Expected date: 07/20/2023  I have advised pt to stop the Lt3, which is replacement for T3 as this can increase anxiety. She has requested we check her labs and see if she has any abnormal thyroid levels or hashimotos. She would like to see provider for annual well ness exam and we will have her check labs a few days prior to apt.     3. Anxiety and depression  -     DULoxetine (CYMBALTA) 60 MG capsule; Take 1 capsule (60 mg total) by mouth once daily.  Dispense: 30 capsule; Refill: 11  I have sent in a new rx for an increased dose of cymbalta. She has a lot of back and neck pain , chronic in nature and this may be the better option to both treat the pain and the anxiety and depression. She is  to return in 4 weeks for assessment   4. Attention deficit disorder, unspecified hyperactivity presence     Pt seen today for ADHD followup doing well on current regimen, will refill today. We have discussed and they understand that this is a controlled substance and as such should be kept in a secure place, only taken as directed, and never shared with others.  I have reviewed the . Will see patient for followup in 1 month .     Future Appointments   Date Time Provider Department Center   9/28/2023  8:00 AM Katharine Steve MD [facility]     Follow up in about 4 weeks (around 8/17/2023).      Natividad Galeas NP    Lab Frequency Next Occurrence   Ambulatory referral/consult to Physical/Occupational Therapy Once 06/15/2023   Ambulatory referral/consult to Podiatry Once 07/06/2023

## 2023-07-21 ENCOUNTER — PATIENT MESSAGE (OUTPATIENT)
Dept: PAIN MEDICINE | Facility: CLINIC | Age: 43
End: 2023-07-21
Payer: COMMERCIAL

## 2023-07-22 DIAGNOSIS — F90.0 ATTENTION DEFICIT HYPERACTIVITY DISORDER (ADHD), PREDOMINANTLY INATTENTIVE TYPE: ICD-10-CM

## 2023-07-24 RX ORDER — LISDEXAMFETAMINE DIMESYLATE CAPSULES 20 MG/1
20 CAPSULE ORAL EVERY MORNING
Qty: 30 CAPSULE | Refills: 0 | Status: SHIPPED | OUTPATIENT
Start: 2023-07-24 | End: 2023-09-09 | Stop reason: SDUPTHER

## 2023-08-15 NOTE — PROGRESS NOTES
[facility]      Chief Complaint:   Chief Complaint   Patient presents with    Back Pain       History of Present Illness: Margareth Echeverria is a 43 y.o. female referred by Dr. Nelda Thornton for Pelvic pain.      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 and she has a history of chronic pelvic pain and LBP with prior hysterectomy and b/l oophorectomy and she was feeling pretty good in regards to her chronic pelvic pain until Mother's day where she developed rather sudden increase in her pelvic pain with no clear inciting event  Location: involves her entire pelvis, but seems to be primarily localized over her sacrum area  Radiation: She feels some radiation into her anterior thighs  Timing: constant  Quality: Aching, Throbbing, Pounding, Deep, Stabbing, and nagging and swelling  Exacerbating Factors: Bowel movements, valsalva, intercourse, sitting, standing prolonged  Alleviating Factors: nothing  Associated Symptoms: She has neuropathy in her legs/feet from prior to this incident. She feels weakness in her legs. She has chronic constipation, but has been having daily BMs. She does note recent weight loss (states about 30 lbs). She does note night sweats. She denies fevers, urinary incontinence/change in function and bowel incontinence/change in function    Nothing seems to be helping, she has tried heating, cold. She saw gynecology and was sent here and to pelvic floor rehab.     She denies aggravation with urination.     Severity: Currently: 8/10   Typical Range: 5-10/10     Exacerbation: 10/10     P = 8  E = 10  G = 10  Baseline PEG Score = 9.33    Interval History (06/29/2023):  Margareth Echeverria returns today for follow up.  At the last clinic visit, scheduled SIJ.    SIJ provided > 50% relief. However, the area of the needle insertion was slightly erythematous. She went swimming the day after the injection and suspects that may have contributed. She  rubbed her eczema cream on it and it seemed to help, but still slightly red. She denies fevers, chills, drainage.     Currently, the back pain is improved.  She has had some insomnia since the steroid injection. She does note some radiating pains into the legs that     Etodolac was sent in since last visit, and she finds that helps as well.     She is mostly noting bilateral feet pain today. The pain is primarily in the metatarsal area, but can radiate up her ankles at times. Pain seems pretty constant, but worse with walking. She states she has been diagnosed with neuropathy in the past, but this pain seems different.     Current Pain Scales:  Current: 5/10              Typical Range: 3-7/10      Interval History (08/16/2023):  Margareth Echeverria returns today for follow up.  At the last clinic visit, encouraged her to start PT which has not been done due to back pain improved and dealing with stress from her current pain medicine physician.     Currently, the lower back pain is improved.  She is mostly having HA pain over the left side of her scalp. Pain radiates from the occiput to her parietal region and is constant and has been present for days. Stress seems to be aggravating it. She states she has occipital neuralgia and typically gets injections of her occipital nerves which helps her HA. Pain has been worsening because she has been under a lot of stress. Her current physician managing her pain medicine, Dr. Anaya, has discharged her from clinic for no reason due to her having a procedure (SIJ CSI) with this clinic which helped her. She did nothing to breach her pain contract, but she has been having more difficulty with sleeping and worsening pain with all the stress he has been putting her through. She is prescribed hydrocodone/APAP 10/325 mg QID PRN and states she was not needing to take it as often until he discharged her out of the blue, just because she had her procedure done with this clinic instead of  with the interventional pain physicians at the clinic that Dr. Anaya works.     She also notes continued bilateral feet pain, but now pain worse over bilateral heels and radiates anteriorly and worst 1st in AM.     Current Pain Scales:  Current: 9/10              Typical Range: 7-10/10     Current PEG Score: 9    Opioid Risk Score         Value Time User    Opioid Risk Score  7 6/8/2023 10:19 AM Katharine Steve MD             Previous Interventions:  - 6/23/23: B/L SIJ CSI w/ > 50% relief in pain and improved fxn.   - Procedures for neck and back pain, but not for pelvic pain    Previous Therapies:  PT/OT: yes for back pain but not pelvic floor  Relevant Surgery: yes    - surgery for trigeminal neuralgia  Previous Medications:   - NSAIDS:   - Muscle Relaxants: Zanaflex 2mg   - TCAs:   - SNRIs: cymbalta  - Topicals: CBD  - Anticonvulsants:    - Opioids: hydrocodone    Current Pain Medications:  Cymbalta 30mg  Norco 10/325mg   Tizanidine 2 mg     Blood Thinners: No    Full Medication List:    Current Outpatient Medications:     desonide (DESOWEN) 0.05 % cream, Apply 15 g topically once daily., Disp: , Rfl:     DULoxetine (CYMBALTA) 60 MG capsule, Take 1 capsule (60 mg total) by mouth once daily., Disp: 30 capsule, Rfl: 11    etodolac (LODINE) 300 MG Cap, TAKE 1 CAPSULE (300 MG TOTAL) BY MOUTH 2 (TWO) TIMES DAILY AS NEEDED (PAIN AND INFLAMMATION)., Disp: 60 capsule, Rfl: 0    HYDROcodone-acetaminophen (NORCO) 7.5-325 mg per tablet, Take 1 tablet by mouth every 4 to 6 hours as needed for Pain., Disp: 150 tablet, Rfl: 0    levocetirizine (XYZAL) 5 MG tablet, , Disp: , Rfl:     lisdexamfetamine (VYVANSE) 20 MG capsule, Take 1 capsule (20 mg total) by mouth every morning., Disp: 30 capsule, Rfl: 0    NP THYROID 60 mg Tab, Take 60 mg by mouth once daily., Disp: , Rfl:     ondansetron (ZOFRAN) 4 MG tablet, TAKE 1 TABLET BY MOUTH EVERY 6 HOURS FOR 10 DAYS, Disp: , Rfl:     progesterone (PROMETRIUM) 100 MG capsule, , Disp: ,  "Rfl:     progesterone (PROMETRIUM) 200 MG capsule, , Disp: , Rfl:     promethazine (PHENERGAN) 25 MG tablet, , Disp: , Rfl:     spironolactone (ALDACTONE) 50 MG tablet, Take 50 mg by mouth once daily., Disp: , Rfl:     SYMPROIC 0.2 mg Tab, , Disp: , Rfl:     tiZANidine (ZANAFLEX) 2 MG tablet, , Disp: , Rfl:      Review of Systems:  ROS    Allergies:  Opioids - morphine analogues, Bee pollen, Morphine, and Tramadol     Medical History:   has a past medical history of Adult hypophosphatasia, AV block, Fibromyalgia, IBS (irritable bowel syndrome), IC (interstitial cystitis), Occipital neuralgia, and Trigeminal neuralgia.    Surgical History:   has a past surgical history that includes Inguinal hernia repair (Bilateral); Laparoscopic cholecystectomy; Augmentation of breast; Fulguration of endometriosis (07/05/2016); Laparoscopy-assisted vaginal hysterectomy (11/11/2019); Laparoscopy (12/08/2019); Appendectomy; Cystoscopy with hydrodistension and biopsy of bladder (12/28/2020); Removal of breast implant (04/2021); and Hysterectomy.    Family History:  family history includes Breast cancer in her maternal cousin; Lung cancer in her paternal grandmother; No Known Problems in her father and mother.    Social History:   reports that she quit smoking about 11 years ago. Her smoking use included cigarettes. She started smoking about 21 years ago. She has a 5.0 pack-year smoking history. She has never been exposed to tobacco smoke. She has never used smokeless tobacco. She reports current alcohol use. She reports current drug use. Drug: Marijuana.    Physical Exam:  /77   Pulse 74   Resp 18   Ht 5' 4" (1.626 m)   Wt 55 kg (121 lb 4.8 oz)   SpO2 99%   BMI 20.82 kg/m²   GEN: No acute distress. Calm, comfortable  HENT: Normocephalic, atraumatic, moist mucous membranes  EYE: Anicteric sclera, non-injected.   CV: Non-diaphoretic. Regular Rate. Radial Pulses 2+.  RESP: Breathing comfortably. Chest expansion " symmetric.  EXT: No clubbing, cyanosis.   SKIN: Warm, & dry to palpation. No visible rashes or lesions of exposed skin.   PSYCH: Pleasant mood and appropriate affect. Recent and remote memory intact.   GAIT: Independent, normal ambulation  Neck Exam:       Inspection: No erythema, bruising.       Palpation: (+) TTP of left cervical paraspinals and over SHANELLE and DANY      ROM: No significant Limitation in flexion, extension, lateral bending or rotation      Provocative Maneuvers:  (-) Spurling's bilaterally  Lumbar Spine Exam:       Inspection: No bruising, swelling. Slight erythema noted at bilateral injection sites.        Palpation: No calor to the area. (+) TTP of b/l SIJ > lumbar paraspinals b/l. (+) TTP of sacrum and coccyx         ROM:  Limited in flexion, extension, lateral bending.       (+) Facet loading bilaterally      (-) Straight Leg Raise bilaterally      (+) ISABELLA bilaterally      (+) SIJ Compression Test bilaterally      (+) Gaenslan's Test bilaterally      (+) Thigh thrust/Posterior Pelvic Pain Provocation Test bilaterally      (+) Sacral thrust  Hip/Pelvis Exam:      Inspection: No gross deformity or apparent leg length discrepancy      Palpation: (+) TTP along inguinal ligaments b/l, pubic symphysis. (+) TTP right ischial bursa and b/l piriformis and gluteal muscles, (-) TTP to bilateral greater trochanteric bursas.       ROM:  No limitation or pain in internal rotation, external rotation b/l  Ankle Exam:     Inspection: No swelling, bruising or deformity. Slight pes cavus b/l      Palpation: (+) TTP to bilateral metarsal area and right medial heel.      ROM: ADF to 15 degrees b/l w/ knees flexed and 8 degrees b/l w/ knees extended     Provocative Tests:  (-) Anterior Drawer        (-) Talar Tilt   (+) Metatarsal squeeze on right, but neg on left     Dorsalis pedis pulse intact b/l  Neurologic Exam:     Alert. Speech is fluent and appropriate.     Strength: 4/5 diffusely in the left leg, otherwise  5/5 throughout bilateral upper and lower extremities     Sensation:  Grossly intact to light touch in bilateral upper and lower extremities     Reflexes: 2+ in b/l BR, biceps, triceps, patella, achilles     Tone: No abnormality appreciated in bilateral lower extremities     No Clonus     Downgoing toes on plantar stimulation     (-) Franco bilaterally                Imaging:  - MRI L-spine 6/13/23:  Vertebral body alignment is preserved. Vertebral body heights are maintained. Bone marrow signal is within normal limits. The conus medullaris terminates normally at the level of L2. Lumbar nerve roots have normal appearance. Preserved intervertebral disc space height and signal.    T12-L1: No significant canal or foraminal stenosis.    L1-L2: No significant canal or foraminal stenosis.    L2-L3: No significant canal or foraminal stenosis.    L3-L4: No significant canal or foraminal stenosis.    L4-L5: Broad-based disc bulge, ligamentum flavum thickening, facet hypertrophy with at most mild canal stenosis. Mild effacement of the lateral recesses. No significant foraminal stenosis.    L5-S1: No significant canal or foraminal stenosis.        - MRI Pelvis w.o 6/13/23:  The femoral heads are well formed and seated within well formed acetabula. Bone marrow signal is normal. The sacroiliac joints are intact. There is no muscle atrophy or edema. There is no intrapelvic visceral abnormalities. Prior hysterectomy. There is no evidence of fluid collection. There is mild right hamstring origin tendinosis. The gluteus medius and minimus tendon insertions are intact. The hip adductors are intact. There is no evidence of iliopsoas or trochanteric bursitis.    Impression:    Mild right hamstring origin tendinosis. Otherwise, negative pelvic MRI.      - X-ray lumbar spine 6/8/23:  Transitional anatomy with sacralization of L5.  Vertebral body heights maintained.  No spondylolisthesis.  Disc spaces maintained.  Mild facet arthropathy.   No acute osseous abnormality.  Bilateral SI joint degenerative changes.  Constipation suspected.    - X-ray pelvis 6/8/23:  Hip joint spaces maintained.  Likely transitional anatomy with L5 sacralization.  Symmetric degenerative findings of the bilateral SI joints.  Constipation findings noted.    - Pelvis CT w/o contrast 1/6/23:  The visualized gastrointestinal tract appears normal.  Urinary bladder is normal.  Inguinal regions are normal.  No masses, fluid collections or adenopathy seen.  1. There is a cystic area on the left side of the pelvis measuring about 5.8 cm. This has a simple cystic appearance to it. Statistically this most likely represents a ovarian cyst. The patient's uterus has apparently beenremoved.  The bony elements appear intact.  Impression:  1. Cyst on the left side of the pelvis. Statistically most likely represents an ovarian cyst. Evaluation with pelvic ultrasound is recommended.    - Pelvic US 12/21/22:  Transabdominal and transvaginal pelvic ultrasound was performed by the sonographer. Static images are submitted. Uterus is not visualized consistent with patient's history of previous partial hysterectomy. Right  ovary is not visualized. Patient provides a history of previous right oophorectomy. Left ovary measures 3.4 x 3.9 x 2.9 cm in size. Multiple left ovarian follicles are visualized. Small left ovarian cyst is visualized measuring 1.8 cm in diameter. Left ovarian blood flow is present. Trace amount of pelvic free fluid is present adjacent to the vaginal cuff.  IMPRESSION:  1. Nonvisualization of the uterus and right ovary consistent with patient's surgical history.  2. Small 1.8 cm left ovarian cyst.  3. Trace amount of pelvic free fluid.    - MRI C-spine 9/26/22:  FINDINGS:  Reversal of the normal cervical lordotic curvature. The vertebral body heights and alignment are maintained throughout the cervical spine. No abnormal vertebral body marrow signal. Multilevel intervertebral disc  desiccation.  Spinal cord is normal in caliber and demonstrates normal signal. The visualized portions of the cervicomedullary junction are unremarkable.  C2-C3: Normal intervertebral disc contour. No central canal or neural foraminal narrowing.  C3-C4: Intervertebral disc bulge narrows the ventral subarachnoid space. No central canal or neural foraminal narrowing.  C4-C5: Intervertebral disc bulge eccentric to the right side. There is narrowing of the ventral subarachnoid space. The central canal is borderline measuring 10 mm. Moderate right and mild left neural foraminal narrowing.   C5-C6: Intervertebral disc bulge with superimposed left paracentral disc protrusion. There is narrowing of the ventral subarachnoid space and left subarticular recess. Bilateral uncovertebral and facet hypertrophy. Moderate bilateral neural foraminal narrowing. The central canal is narrowed to 8 mm.  C6-C7: Intervertebral disc bulge narrows the ventral subarachnoid space. The central canal is narrowed to 9 mm. No neural foraminal narrowing.  C7-T1: Normal intervertebral disc contour. No central canal or neural foraminal narrowing.  The paravertebral soft tissues are unremarkable.  IMPRESSION:  Multilevel cervical spondylosis most prominent at C4-7. The findings are not significantly changed when compared to the prior exam.    - MRI Lumbar spine 8/17/21:  FINDINGS:  Alignment: Normal.  Vertebral bodies: Normal height and marrow signal.  T12-L1: No significant spinal canal stenosis or neuroforaminal narrowing.  L1-L2: No significant spinal canal stenosis or neuroforaminal narrowing.  L2-L3: No significant spinal canal stenosis or neuroforaminal narrowing.  L3-L4: No significant spinal canal stenosis or neuroforaminal narrowing.  L4-L5: No significant spinal canal stenosis or neuroforaminal narrowing.  L5-S1: No significant spinal canal stenosis or neuroforaminal narrowing.  Spinal cord: The cord extends down to the L1 level. The cord  has a normal size, configuration and signal pattern.    - MRI Thoracic spine w/o 6/15/20:  FINDINGS:  Vertebral bodies: Normal vertebral body height, alignment and marrow signal.  C7-T1: The disc is normal. The central canal and neural foramen appear normal. No displacement or compression of the neural elements are seen.  T1-T2: The disc has a normal contour. The central canal and neural foramen appear normal. No displacement or compression of the neural elements are seen.  T2-T3: The disc has a normal contour. The central canal and neural foramen appear normal. No displacement or compression of the neural elements are seen.  T3-T4: The disc has a normal contour. The central canal and neural foramen appear normal. No displacement or compression of the neural elements are seen.  T4-T5: The disc has a normal contour. The central canal and neural foramen appear normal. No displacement or compression of the neural elements are seen.  T5-T6: The disc has a normal contour. The central canal and neural foramen appear normal. No displacement or compression of the neural elements are seen.  T6-T7: The disc has a normal contour. The central canal and neural foramen appear normal. No displacement or compression of the neural elements are seen.  T7-T8: The disc has a normal contour. The central canal and neural foramen appear normal. No displacement or compression of the neural elements are seen.  T8-T9: The disc has a normal contour. The central canal and neural foramen appear normal. No displacement or compression of the neural elements are seen.  T9-T10: The disc has a normal contour. The central canal and neural foramen appear normal. No displacement or compression of the neural elements are seen.  T10-T11: The disc has a normal contour. The central canal and neural foramen appear normal. No displacement or compression of the neural elements are seen.  T11-T12: The disc has a normal contour. The central canal and neural  foramen appear normal. No displacement or compression of the neural elements are seen.  T12-L1: The disc has a normal contour. The central canal and neural foramen appear normal. No displacement or compression of the neural elements are seen.  Spinal cord: The cord has a normal size, configuration and signal pattern.  Additional findings: None seen.      Labs:  BMP  Lab Results   Component Value Date     08/16/2023    K 5.4 (H) 08/16/2023     08/16/2023    CO2 29 08/16/2023    BUN 22.2 (H) 08/16/2023    CREATININE 0.74 08/16/2023    CALCIUM 10.0 08/16/2023    ANIONGAP 9.0 08/16/2023     Lab Results   Component Value Date    AST 16 08/16/2023     Lab Results   Component Value Date     08/16/2023       Assessment:  Margareth Echeverria is a 43 y.o. female with the following diagnoses based on history, exam, and imaging:    Problem List Items Addressed This Visit    None  Visit Diagnoses       Chronic pain syndrome    -  Primary    Relevant Medications    HYDROcodone-acetaminophen (NORCO) 7.5-325 mg per tablet    Other Relevant Orders    C-Reactive Protein    Sedimentation rate    POCT Urine Drug Screen Pain Management    Pain Clinic Drug Screen    Adult hypophosphatasia        Relevant Orders    Ambulatory referral/consult to Rheumatology    C-Reactive Protein    Sedimentation rate    Polyarthralgia        Relevant Orders    Ambulatory referral/consult to Rheumatology    C-Reactive Protein    Sedimentation rate    Long term (current) use of opiate analgesic        Relevant Medications    HYDROcodone-acetaminophen (NORCO) 7.5-325 mg per tablet    Plantar fasciitis        Occipital neuralgia of left side        Relevant Orders    Nerve Block    Nerve Block            This is a pleasant 43 y.o. lady presenting with:     - Neck pain and cervicogenic HA. Appears to have occipital neuralgia on left.   - Bilateral feet pain: Pain primarily over metatarsal areas. Metatarsalgia vs peripheral neuropathy vs  radicular pain from lumbar spine.   - Referred to podiatry for further evaluation.   - Chronic bilateral low back pain: Pain appears primarily due to SIJ dysfunction on exam, but may have facet and myofascial contributions as well.    - Back pain improved after bilateral SIJ CSI   - She does note some radiation into the legs in L4/5 distribution and MRI with mild canal stenosis at L4-5 with facet arthropathy  - Chronic pelvic pain: Pain appears multifactorial and she has a complex history with prior hysterectomy for endometriosis, interstitial cystitis, b/l oophorectomy for ovarian cyst, prior bilateral inguinal hernia repair and hypophosphatasia. Unclear exactly what the cause of the pain is at this point as she has multiple areas of pain.    - SIJ CSI did help some of the anterior pelvis pain as well  - History of fibromyalgia.   - History of familial hypophosphatasia and h/o polyarthralgia that was   - Comorbidities: Depression. ADHD. Hypophosphatasia. H/o Trigeminal neuralgia. IBS w/ constipation. H/o AV block. Former smoker. Allergy to opioids.     Treatment Plan:   - PT/OT/HEP: Encouraged her to start PT for her SIJ pain and back pain in addition to her pelvic floor therapy.   - Procedures: Performed left DANY and SHANELLE blocks today in clinic with US guidance w/ 4 mg/mL dexamethasone and lido 1%   - Consider repeat b/l SIJ CSI if back pain returns/worsens  - Consider L4-5 IL ALEKSANDR for radicular leg pains   - Consider b/l superior hypogastric plexus block for chronic pelvic pain   - Consider ganglion impar and sacrococcygeal injection for coccydynia  - Medications:   - Cont etodolac PRN  - I discussed with the patient that our clinic will take over management of her hydrocodone. However, I did discuss with patient, that we taper and discontinue chronic opioids and focus our pain management treatments to physical/occupational therapy, interventional procedures, non-narcotic medications etc; as I do not believe the  risks currently outweigh the potential benefits. I discussed that opioids are not currently associated with improved functional outcomes, and about the risks/side effects of long term use of opioids including: dependence, tolerance, addiction, respiratory depression, somnolence, immune and endocrine dysfunction.  Short courses for acute flares are appropriate in some instances.   - Our plan will be to taper the dose 10-20% per month as possible, though this rate will increase necessarily as overall dose decreases.    - We will taper next refill to Hydrocodone 7.5/325 mg 5x/d PRN . This is a decrease from 40 MME to 37.5 MME   - Pain contract signed 08/16/2023    -  reviewed  - Imaging: Reviewed.   - Labs: Reviewed.  Medications are appropriately dosed for current hepatorenal function.   - ESR/CRP   - UDS today  - Consult rheumatology regarding hypophosphatasia    Follow Up: RTC in 1 month or sooner PRN      Katharine Steve M.D.  Interventional Pain Medicine / Physical Medicine & Rehabilitation

## 2023-08-16 ENCOUNTER — OFFICE VISIT (OUTPATIENT)
Dept: PAIN MEDICINE | Facility: CLINIC | Age: 43
End: 2023-08-16
Payer: COMMERCIAL

## 2023-08-16 VITALS
SYSTOLIC BLOOD PRESSURE: 123 MMHG | RESPIRATION RATE: 18 BRPM | WEIGHT: 121.31 LBS | BODY MASS INDEX: 20.71 KG/M2 | DIASTOLIC BLOOD PRESSURE: 77 MMHG | HEART RATE: 74 BPM | OXYGEN SATURATION: 99 % | HEIGHT: 64 IN

## 2023-08-16 DIAGNOSIS — M54.81 OCCIPITAL NEURALGIA OF LEFT SIDE: ICD-10-CM

## 2023-08-16 DIAGNOSIS — Z79.891 LONG TERM (CURRENT) USE OF OPIATE ANALGESIC: ICD-10-CM

## 2023-08-16 DIAGNOSIS — E83.39 ADULT HYPOPHOSPHATASIA: ICD-10-CM

## 2023-08-16 DIAGNOSIS — M72.2 PLANTAR FASCIITIS: ICD-10-CM

## 2023-08-16 DIAGNOSIS — M25.50 POLYARTHRALGIA: ICD-10-CM

## 2023-08-16 DIAGNOSIS — G89.4 CHRONIC PAIN SYNDROME: Primary | ICD-10-CM

## 2023-08-16 LAB
ABS NRBC COUNT: 0 X 10 3/UL (ref 0–0.01)
ABSOLUTE BASOPHIL: 0.04 X 10 3/UL (ref 0–0.22)
ABSOLUTE EOSINOPHIL: 0.1 X 10 3/UL (ref 0.04–0.54)
ABSOLUTE IMMATURE GRAN: 0.01 X 10 3/UL (ref 0–0.04)
ABSOLUTE LYMPHOCYTE: 1.63 X 10 3/UL (ref 0.86–4.75)
ABSOLUTE MONOCYTE: 0.41 X 10 3/UL (ref 0.22–1.08)
ALBUMIN SERPL-MCNC: 4.8 G/DL (ref 3.5–5.2)
ALBUMIN/GLOB SERPL ELPH: 1.9 {RATIO} (ref 1–2.7)
ALP ISOS SERPL LEV INH-CCNC: 34 U/L (ref 35–105)
ALT (SGPT): 12 U/L (ref 0–33)
ANION GAP SERPL CALC-SCNC: 9 MMOL/L (ref 8–17)
AST SERPL-CCNC: 16 U/L (ref 0–32)
BASOPHILS NFR BLD: 0.8 % (ref 0.2–1.2)
BILIRUBIN, TOTAL: 0.47 MG/DL (ref 0–1.2)
BUN/CREAT SERPL: 30 (ref 6–20)
CALCIUM SERPL-MCNC: 10 MG/DL (ref 8.6–10.2)
CARBON DIOXIDE, CO2: 29 MMOL/L (ref 22–29)
CHLORIDE: 103 MMOL/L (ref 98–107)
CHOLEST SERPL-MSCNC: 139 MG/DL (ref 100–200)
CREAT SERPL-MCNC: 0.74 MG/DL (ref 0.5–0.9)
EOSINOPHIL NFR BLD: 2 % (ref 0.7–7)
GFR ESTIMATION: 102.89 ML/MIN/1.73M2
GLOBULIN: 2.5 G/DL (ref 1.5–4.5)
GLUCOSE: 92 MG/DL (ref 74–106)
HCT VFR BLD AUTO: 40.3 % (ref 37–47)
HDLC SERPL-MCNC: 59 MG/DL
HGB BLD-MCNC: 14.3 G/DL (ref 12–16)
IMMATURE GRANULOCYTES: 0.2 % (ref 0–0.5)
LDL/HDL RATIO: 1.1 (ref 1–3)
LDLC SERPL CALC-MCNC: 65.6 MG/DL (ref 0–100)
LYMPHOCYTES NFR BLD: 32.3 % (ref 19.3–53.1)
MCH RBC QN AUTO: 32.6 PG (ref 27–32)
MCHC RBC AUTO-ENTMCNC: 35.5 G/DL (ref 32–36)
MCV RBC AUTO: 91.8 FL (ref 82–100)
MONOCYTES NFR BLD: 8.1 % (ref 4.7–12.5)
NEUTROPHILS # BLD AUTO: 2.85 X 10 3/UL (ref 2.15–7.56)
NEUTROPHILS NFR BLD: 56.6 % (ref 34–71.1)
NUCLEATED RED BLOOD CELLS: 0 /100 WBC (ref 0–0.2)
PLATELET # BLD AUTO: 263 X 10 3/UL (ref 135–400)
POTASSIUM: 5.4 MMOL/L (ref 3.5–5.1)
PROT SNV-MCNC: 7.3 G/DL (ref 6.4–8.3)
RBC # BLD AUTO: 4.39 X 10 6/UL (ref 4.2–5.4)
RDW-SD: 39.3 FL (ref 37–54)
SODIUM: 141 MMOL/L (ref 136–145)
THYROPEROXIDASE AB SERPL-ACNC: <9 IU/ML (ref 0–34)
TRIGL SERPL-MCNC: 72 MG/DL (ref 0–150)
TSH W/REFLEX TO FT4: 1.9 UIU/ML (ref 0.27–4.2)
UREA NITROGEN (BUN): 22.2 MG/DL (ref 6–20)
WBC # BLD: 5.04 X 10 3/UL (ref 4.3–10.8)

## 2023-08-16 PROCEDURE — 3008F PR BODY MASS INDEX (BMI) DOCUMENTED: ICD-10-PCS | Mod: CPTII,S$GLB,, | Performed by: PHYSICAL MEDICINE & REHABILITATION

## 2023-08-16 PROCEDURE — 1159F MED LIST DOCD IN RCRD: CPT | Mod: CPTII,S$GLB,, | Performed by: PHYSICAL MEDICINE & REHABILITATION

## 2023-08-16 PROCEDURE — 1160F PR REVIEW ALL MEDS BY PRESCRIBER/CLIN PHARMACIST DOCUMENTED: ICD-10-PCS | Mod: CPTII,S$GLB,, | Performed by: PHYSICAL MEDICINE & REHABILITATION

## 2023-08-16 PROCEDURE — 99215 OFFICE O/P EST HI 40 MIN: CPT | Mod: 25,S$GLB,, | Performed by: PHYSICAL MEDICINE & REHABILITATION

## 2023-08-16 PROCEDURE — 64450 NERVE BLOCK: ICD-10-PCS | Mod: LT,S$GLB,, | Performed by: PHYSICAL MEDICINE & REHABILITATION

## 2023-08-16 PROCEDURE — 1160F RVW MEDS BY RX/DR IN RCRD: CPT | Mod: CPTII,S$GLB,, | Performed by: PHYSICAL MEDICINE & REHABILITATION

## 2023-08-16 PROCEDURE — 3008F BODY MASS INDEX DOCD: CPT | Mod: CPTII,S$GLB,, | Performed by: PHYSICAL MEDICINE & REHABILITATION

## 2023-08-16 PROCEDURE — 3078F DIAST BP <80 MM HG: CPT | Mod: CPTII,S$GLB,, | Performed by: PHYSICAL MEDICINE & REHABILITATION

## 2023-08-16 PROCEDURE — 64405 NJX AA&/STRD GR OCPL NRV: CPT | Mod: 59,LT,S$GLB, | Performed by: PHYSICAL MEDICINE & REHABILITATION

## 2023-08-16 PROCEDURE — 64450 NJX AA&/STRD OTHER PN/BRANCH: CPT | Mod: LT,S$GLB,, | Performed by: PHYSICAL MEDICINE & REHABILITATION

## 2023-08-16 PROCEDURE — 3074F SYST BP LT 130 MM HG: CPT | Mod: CPTII,S$GLB,, | Performed by: PHYSICAL MEDICINE & REHABILITATION

## 2023-08-16 PROCEDURE — 99215 PR OFFICE/OUTPT VISIT, EST, LEVL V, 40-54 MIN: ICD-10-PCS | Mod: 25,S$GLB,, | Performed by: PHYSICAL MEDICINE & REHABILITATION

## 2023-08-16 PROCEDURE — 1159F PR MEDICATION LIST DOCUMENTED IN MEDICAL RECORD: ICD-10-PCS | Mod: CPTII,S$GLB,, | Performed by: PHYSICAL MEDICINE & REHABILITATION

## 2023-08-16 PROCEDURE — 3074F PR MOST RECENT SYSTOLIC BLOOD PRESSURE < 130 MM HG: ICD-10-PCS | Mod: CPTII,S$GLB,, | Performed by: PHYSICAL MEDICINE & REHABILITATION

## 2023-08-16 PROCEDURE — 3078F PR MOST RECENT DIASTOLIC BLOOD PRESSURE < 80 MM HG: ICD-10-PCS | Mod: CPTII,S$GLB,, | Performed by: PHYSICAL MEDICINE & REHABILITATION

## 2023-08-16 PROCEDURE — 64405 NERVE BLOCK: ICD-10-PCS | Mod: 59,LT,S$GLB, | Performed by: PHYSICAL MEDICINE & REHABILITATION

## 2023-08-16 RX ORDER — HYDROCODONE BITARTRATE AND ACETAMINOPHEN 7.5; 325 MG/1; MG/1
1 TABLET ORAL
Qty: 150 TABLET | Refills: 0 | Status: SHIPPED | OUTPATIENT
Start: 2023-08-16 | End: 2023-08-16 | Stop reason: SDUPTHER

## 2023-08-16 RX ORDER — HYDROCODONE BITARTRATE AND ACETAMINOPHEN 7.5; 325 MG/1; MG/1
1 TABLET ORAL
Qty: 150 TABLET | Refills: 0 | Status: SHIPPED | OUTPATIENT
Start: 2023-08-16 | End: 2023-09-13 | Stop reason: SDUPTHER

## 2023-08-16 NOTE — PROCEDURES
"Nerve Block    Date/Time: 8/16/2023 2:30 PM    Performed by: Katharine Steve MD  Authorized by: Katharine Steve MD  Consent Done: Yes  Time out: Immediately prior to procedure a "time out" was called to verify the correct patient, procedure, equipment, support staff and site/side marked as required.  Indications: pain relief  Body area: head  Nerve: greater occipital  Laterality: left    Patient sedated: no  : Dexamethasone 4 mg/mL.  Preparation: Patient was prepped and draped in the usual sterile fashion.  Patient position: sitting  Needle size: 25 G  Location technique: ultrasound guidance  Local Anesthetic: lidocaine 1% without epinephrine  Anesthetic total: 1 mL  Outcome: pain improved  Patient tolerance: Patient tolerated the procedure well with no immediate complications        " Pull a muscle looking for papers in a box and now upper back and neck hurst badly  Can move head to right, but not to the left  Please advise whether to come in or try something else  Tried tylenol, didn't work, [phone]

## 2023-08-16 NOTE — PROCEDURES
"Nerve Block    Date/Time: 8/16/2023 2:30 PM    Performed by: Katharine Steve MD  Authorized by: Katharine Steve MD  Consent Done: Yes  Time out: Immediately prior to procedure a "time out" was called to verify the correct patient, procedure, equipment, support staff and site/side marked as required.  Indications: pain relief  Body area: head  Nerve: lesser occipital  Laterality: left    Patient sedated: no  : Dexamethasone 4 mg/mL.  Preparation: Patient was prepped and draped in the usual sterile fashion.  Patient position: sitting  Needle size: 25 G  Location technique: ultrasound guidance  Local Anesthetic: lidocaine 1% without epinephrine  Anesthetic total: 1 mL  Outcome: pain improved  Patient tolerance: Patient tolerated the procedure well with no immediate complications        "

## 2023-08-17 ENCOUNTER — CLINICAL SUPPORT (OUTPATIENT)
Dept: OBSTETRICS AND GYNECOLOGY | Facility: CLINIC | Age: 43
End: 2023-08-17
Payer: COMMERCIAL

## 2023-08-17 DIAGNOSIS — Z01.89 ROUTINE LAB DRAW: Primary | ICD-10-CM

## 2023-08-17 LAB
CRP QUANTITATIVE: < 0.29 MG/DL (ref 0–0.3)
ERYTHROCYTE [SEDIMENTATION RATE] IN BLOOD: 7 MM/HR (ref 0–20)

## 2023-08-22 ENCOUNTER — PATIENT MESSAGE (OUTPATIENT)
Dept: PAIN MEDICINE | Facility: CLINIC | Age: 43
End: 2023-08-22
Payer: COMMERCIAL

## 2023-08-23 NOTE — PROGRESS NOTES
UDS does not appear consistent with prescribed medication. Oxazepam and THC metabolite was present in urine. UDS collected on 8/17/23. She has not been prescribed oxazepam, but was prescribed valium and oxazepam is a metabolite of diazepam, but this was prescribed and filled on 6/8/23 x 1 for MRI which was obtained on 6/13/23. She also had a procedure on 6/23/23 and received sedation. Though, this is about 2 months since medication was taken and would not be expected to be present. Will discuss results with patient at next visit and continue taper.

## 2023-09-09 DIAGNOSIS — F90.0 ATTENTION DEFICIT HYPERACTIVITY DISORDER (ADHD), PREDOMINANTLY INATTENTIVE TYPE: ICD-10-CM

## 2023-09-11 RX ORDER — LISDEXAMFETAMINE DIMESYLATE CAPSULES 20 MG/1
20 CAPSULE ORAL EVERY MORNING
Qty: 30 CAPSULE | Refills: 0 | Status: SHIPPED | OUTPATIENT
Start: 2023-09-11 | End: 2023-10-11 | Stop reason: SDUPTHER

## 2023-09-13 ENCOUNTER — OFFICE VISIT (OUTPATIENT)
Dept: PAIN MEDICINE | Facility: CLINIC | Age: 43
End: 2023-09-13
Payer: COMMERCIAL

## 2023-09-13 ENCOUNTER — CLINICAL SUPPORT (OUTPATIENT)
Dept: OBSTETRICS AND GYNECOLOGY | Facility: CLINIC | Age: 43
End: 2023-09-13
Payer: COMMERCIAL

## 2023-09-13 VITALS
SYSTOLIC BLOOD PRESSURE: 122 MMHG | BODY MASS INDEX: 20.49 KG/M2 | WEIGHT: 120 LBS | HEIGHT: 64 IN | DIASTOLIC BLOOD PRESSURE: 79 MMHG | HEART RATE: 78 BPM

## 2023-09-13 DIAGNOSIS — G89.4 CHRONIC PAIN SYNDROME: ICD-10-CM

## 2023-09-13 DIAGNOSIS — Z01.89 ROUTINE LAB DRAW: Primary | ICD-10-CM

## 2023-09-13 DIAGNOSIS — Z79.891 LONG TERM (CURRENT) USE OF OPIATE ANALGESIC: ICD-10-CM

## 2023-09-13 PROCEDURE — 1160F RVW MEDS BY RX/DR IN RCRD: CPT | Mod: CPTII,S$GLB,, | Performed by: PHYSICAL MEDICINE & REHABILITATION

## 2023-09-13 PROCEDURE — 3074F SYST BP LT 130 MM HG: CPT | Mod: CPTII,S$GLB,, | Performed by: PHYSICAL MEDICINE & REHABILITATION

## 2023-09-13 PROCEDURE — 1160F PR REVIEW ALL MEDS BY PRESCRIBER/CLIN PHARMACIST DOCUMENTED: ICD-10-PCS | Mod: CPTII,S$GLB,, | Performed by: PHYSICAL MEDICINE & REHABILITATION

## 2023-09-13 PROCEDURE — 99215 OFFICE O/P EST HI 40 MIN: CPT | Mod: S$GLB,,, | Performed by: PHYSICAL MEDICINE & REHABILITATION

## 2023-09-13 PROCEDURE — 3078F DIAST BP <80 MM HG: CPT | Mod: CPTII,S$GLB,, | Performed by: PHYSICAL MEDICINE & REHABILITATION

## 2023-09-13 PROCEDURE — 3008F BODY MASS INDEX DOCD: CPT | Mod: CPTII,S$GLB,, | Performed by: PHYSICAL MEDICINE & REHABILITATION

## 2023-09-13 PROCEDURE — 3008F PR BODY MASS INDEX (BMI) DOCUMENTED: ICD-10-PCS | Mod: CPTII,S$GLB,, | Performed by: PHYSICAL MEDICINE & REHABILITATION

## 2023-09-13 PROCEDURE — 1159F MED LIST DOCD IN RCRD: CPT | Mod: CPTII,S$GLB,, | Performed by: PHYSICAL MEDICINE & REHABILITATION

## 2023-09-13 PROCEDURE — 3078F PR MOST RECENT DIASTOLIC BLOOD PRESSURE < 80 MM HG: ICD-10-PCS | Mod: CPTII,S$GLB,, | Performed by: PHYSICAL MEDICINE & REHABILITATION

## 2023-09-13 PROCEDURE — 1159F PR MEDICATION LIST DOCUMENTED IN MEDICAL RECORD: ICD-10-PCS | Mod: CPTII,S$GLB,, | Performed by: PHYSICAL MEDICINE & REHABILITATION

## 2023-09-13 PROCEDURE — 3074F PR MOST RECENT SYSTOLIC BLOOD PRESSURE < 130 MM HG: ICD-10-PCS | Mod: CPTII,S$GLB,, | Performed by: PHYSICAL MEDICINE & REHABILITATION

## 2023-09-13 PROCEDURE — 99215 PR OFFICE/OUTPT VISIT, EST, LEVL V, 40-54 MIN: ICD-10-PCS | Mod: S$GLB,,, | Performed by: PHYSICAL MEDICINE & REHABILITATION

## 2023-09-13 RX ORDER — HYDROCODONE BITARTRATE AND ACETAMINOPHEN 7.5; 325 MG/1; MG/1
1 TABLET ORAL
Qty: 150 TABLET | Refills: 0 | Status: SHIPPED | OUTPATIENT
Start: 2023-09-15 | End: 2023-09-15 | Stop reason: SDUPTHER

## 2023-09-13 NOTE — PROGRESS NOTES
[facility]      Chief Complaint:   Chief Complaint   Patient presents with    Chronic Pain Syndrome       History of Present Illness: Margareth Echeverria is a 43 y.o. female referred by Dr. Nelda Thornton for Pelvic pain.      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 and she has a history of chronic pelvic pain and LBP with prior hysterectomy and b/l oophorectomy and she was feeling pretty good in regards to her chronic pelvic pain until Mother's day where she developed rather sudden increase in her pelvic pain with no clear inciting event  Location: involves her entire pelvis, but seems to be primarily localized over her sacrum area  Radiation: She feels some radiation into her anterior thighs  Timing: constant  Quality: Aching, Throbbing, Pounding, Deep, Stabbing, and nagging and swelling  Exacerbating Factors: Bowel movements, valsalva, intercourse, sitting, standing prolonged  Alleviating Factors: nothing  Associated Symptoms: She has neuropathy in her legs/feet from prior to this incident. She feels weakness in her legs. She has chronic constipation, but has been having daily BMs. She does note recent weight loss (states about 30 lbs). She does note night sweats. She denies fevers, urinary incontinence/change in function and bowel incontinence/change in function    Nothing seems to be helping, she has tried heating, cold. She saw gynecology and was sent here and to pelvic floor rehab.     She denies aggravation with urination.     Severity: Currently: 8/10   Typical Range: 5-10/10     Exacerbation: 10/10     P = 8  E = 10  G = 10  Baseline PEG Score = 9.33    Interval History (06/29/2023):  Margareth Echeverria returns today for follow up.  At the last clinic visit, scheduled SIJ.    SIJ provided > 50% relief. However, the area of the needle insertion was slightly erythematous. She went swimming the day after the injection and suspects that may have  contributed. She rubbed her eczema cream on it and it seemed to help, but still slightly red. She denies fevers, chills, drainage.     Currently, the back pain is improved.  She has had some insomnia since the steroid injection. She does note some radiating pains into the legs that     Etodolac was sent in since last visit, and she finds that helps as well.     She is mostly noting bilateral feet pain today. The pain is primarily in the metatarsal area, but can radiate up her ankles at times. Pain seems pretty constant, but worse with walking. She states she has been diagnosed with neuropathy in the past, but this pain seems different.     Current Pain Scales:  Current: 5/10              Typical Range: 3-7/10      Interval History (08/16/2023):  Margareth Echeverria returns today for follow up.  At the last clinic visit, encouraged her to start PT which has not been done due to back pain improved and dealing with stress from her current pain medicine physician.     Currently, the lower back pain is improved.  She is mostly having HA pain over the left side of her scalp. Pain radiates from the occiput to her parietal region and is constant and has been present for days. Stress seems to be aggravating it. She states she has occipital neuralgia and typically gets injections of her occipital nerves which helps her HA. Pain has been worsening because she has been under a lot of stress. Her current physician managing her pain medicine, Dr. Anaya, has discharged her from clinic for no reason due to her having a procedure (SIJ CSI) with this clinic which helped her. She did nothing to breach her pain contract, but she has been having more difficulty with sleeping and worsening pain with all the stress he has been putting her through. She is prescribed hydrocodone/APAP 10/325 mg QID PRN and states she was not needing to take it as often until he discharged her out of the blue, just because she had her procedure done with this  clinic instead of with the interventional pain physicians at the clinic that Dr. Anaya works.     She also notes continued bilateral feet pain, but now pain worse over bilateral heels and radiates anteriorly and worst 1st in AM.     Current Pain Scales:  Current: 9/10              Typical Range: 7-10/10     Interval History (09/13/2023):  Margareth Echeverria returns today for follow up.  At the last clinic visit, patient received left DANY and SHANELLE block in clinic with US guidance w/ 4 mg/mL dexamethasone and lido 1% which provided nearly complete relief.     Currently, her chronic pain is improved.      Patient is currently taking Norco and Erodolac 300mg which provides 75% relief. Patient states that although the Erodolac 300mg helps, it does cause an occasional headache when she takes close to her hydocodone. She is currently anxious about decreasing her dose further and would like to go one more month prior to continuing her taper.     Current Pain Scales:  Current: 5/10              Typical Range: 6-7/10     Current PEG Score: 4.33    Opioid Risk Score         Value Time User    Opioid Risk Score  7 6/8/2023 10:19 AM Katharine Steve MD           Previous Interventions:  - 8/16/23: left DANY and SHANELLE block in clinic with US guidance w/ 4 mg/mL dexamethasone and lido 1% which provided nearly complete relief.   - 6/23/23: B/L SIJ CSI w/ > 50% relief in pain and improved fxn.   - Procedures for neck and back pain, but not for pelvic pain    Previous Therapies:  PT/OT: yes for back pain but not pelvic floor  Relevant Surgery: yes    - surgery for trigeminal neuralgia  Previous Medications:   - NSAIDS:   - Muscle Relaxants: Zanaflex 2mg   - TCAs:   - SNRIs: cymbalta  - Topicals: CBD  - Anticonvulsants:    - Opioids: hydrocodone    Current Pain Medications:  Cymbalta 60mg  Norco 7.5/325mg   Tizanidine 2 mg   Etodolac    Blood Thinners: No    Full Medication List:    Current Outpatient Medications:     desonide (DESOWEN)  0.05 % cream, Apply 15 g topically once daily., Disp: , Rfl:     DULoxetine (CYMBALTA) 60 MG capsule, Take 1 capsule (60 mg total) by mouth once daily., Disp: 30 capsule, Rfl: 11    levocetirizine (XYZAL) 5 MG tablet, , Disp: , Rfl:     lisdexamfetamine (VYVANSE) 20 MG capsule, Take 1 capsule (20 mg total) by mouth every morning., Disp: 30 capsule, Rfl: 0    ondansetron (ZOFRAN) 4 MG tablet, TAKE 1 TABLET BY MOUTH EVERY 6 HOURS FOR 10 DAYS, Disp: , Rfl:     progesterone (PROMETRIUM) 100 MG capsule, , Disp: , Rfl:     progesterone (PROMETRIUM) 200 MG capsule, , Disp: , Rfl:     promethazine (PHENERGAN) 25 MG tablet, , Disp: , Rfl:     SYMPROIC 0.2 mg Tab, , Disp: , Rfl:     tiZANidine (ZANAFLEX) 2 MG tablet, , Disp: , Rfl:     etodolac (LODINE) 300 MG Cap, TAKE 1 CAPSULE (300 MG TOTAL) BY MOUTH 2 (TWO) TIMES DAILY AS NEEDED (PAIN AND INFLAMMATION)., Disp: 60 capsule, Rfl: 0    [START ON 9/15/2023] HYDROcodone-acetaminophen (NORCO) 7.5-325 mg per tablet, Take 1 tablet by mouth every 4 to 6 hours as needed for Pain., Disp: 150 tablet, Rfl: 0     Review of Systems:  ROS    Allergies:  Opioids - morphine analogues, Bee pollen, Morphine, and Tramadol     Medical History:   has a past medical history of ADHD (attention deficit hyperactivity disorder), Adult hypophosphatasia, AV block, Fibromyalgia, IBS (irritable bowel syndrome), IC (interstitial cystitis), Occipital neuralgia, and Trigeminal neuralgia.    Surgical History:   has a past surgical history that includes Inguinal hernia repair (Bilateral); Laparoscopic cholecystectomy; Augmentation of breast; Fulguration of endometriosis (07/05/2016); Laparoscopy-assisted vaginal hysterectomy (11/11/2019); Laparoscopy (12/08/2019); Appendectomy; Cystoscopy with hydrodistension and biopsy of bladder (12/28/2020); Removal of breast implant (04/2021); and Hysterectomy.    Family History:  family history includes Breast cancer in her maternal cousin; Lung cancer in her paternal  "grandmother; No Known Problems in her father and mother.    Social History:   reports that she quit smoking about 11 years ago. Her smoking use included cigarettes. She started smoking about 21 years ago. She has a 5.0 pack-year smoking history. She has never been exposed to tobacco smoke. She has never used smokeless tobacco. She reports current alcohol use. She reports current drug use. Drug: Marijuana.    Physical Exam:  /79   Pulse 78   Ht 5' 4" (1.626 m)   Wt 54.4 kg (120 lb)   BMI 20.60 kg/m²   GEN: No acute distress. Calm, comfortable  HENT: Normocephalic, atraumatic, moist mucous membranes  EYE: Anicteric sclera, non-injected.   CV: Non-diaphoretic. Regular Rate. Radial Pulses 2+.  RESP: Breathing comfortably. Chest expansion symmetric.  EXT: No clubbing, cyanosis.   SKIN: Warm, & dry to palpation. No visible rashes or lesions of exposed skin.   PSYCH: Pleasant mood and appropriate affect. Recent and remote memory intact.   GAIT: Independent, normal ambulation  Neck Exam:       Inspection: No erythema, bruising.       Palpation: (+) TTP of left cervical paraspinals and over SHANELLE and DANY      ROM: No significant Limitation in flexion, extension, lateral bending or rotation      Provocative Maneuvers:  (-) Spurling's bilaterally  Lumbar Spine Exam:       Inspection: No bruising, swelling. Slight erythema noted at bilateral injection sites.        Palpation: No calor to the area. (+) TTP of b/l SIJ > lumbar paraspinals b/l. (+) TTP of sacrum and coccyx         ROM:  Limited in flexion, extension, lateral bending.       (+) Facet loading bilaterally      (-) Straight Leg Raise bilaterally      (+) ISABELLA bilaterally      (+) SIJ Compression Test bilaterally      (+) Gaenslan's Test bilaterally      (+) Thigh thrust/Posterior Pelvic Pain Provocation Test bilaterally      (+) Sacral thrust  Hip/Pelvis Exam:      Inspection: No gross deformity or apparent leg length discrepancy      Palpation: (+) TTP along " inguinal ligaments b/l, pubic symphysis. (+) TTP right ischial bursa and b/l piriformis and gluteal muscles, (-) TTP to bilateral greater trochanteric bursas.       ROM:  No limitation or pain in internal rotation, external rotation b/l  Ankle Exam:     Inspection: No swelling, bruising or deformity. Slight pes cavus b/l      Palpation: (+) TTP to bilateral metarsal area and right medial heel.      ROM: ADF to 15 degrees b/l w/ knees flexed and 8 degrees b/l w/ knees extended     Provocative Tests:  (-) Anterior Drawer        (-) Talar Tilt   (+) Metatarsal squeeze on right, but neg on left     Dorsalis pedis pulse intact b/l  Neurologic Exam:     Alert. Speech is fluent and appropriate.     Strength: 4/5 diffusely in the left leg, otherwise 5/5 throughout bilateral upper and lower extremities     Sensation:  Grossly intact to light touch in bilateral upper and lower extremities     Reflexes: 2+ in b/l BR, biceps, triceps, patella, achilles     Tone: No abnormality appreciated in bilateral lower extremities     No Clonus     Downgoing toes on plantar stimulation     (-) Franco bilaterally                Imaging:  - MRI L-spine 6/13/23:  Vertebral body alignment is preserved. Vertebral body heights are maintained. Bone marrow signal is within normal limits. The conus medullaris terminates normally at the level of L2. Lumbar nerve roots have normal appearance. Preserved intervertebral disc space height and signal.    T12-L1: No significant canal or foraminal stenosis.    L1-L2: No significant canal or foraminal stenosis.    L2-L3: No significant canal or foraminal stenosis.    L3-L4: No significant canal or foraminal stenosis.    L4-L5: Broad-based disc bulge, ligamentum flavum thickening, facet hypertrophy with at most mild canal stenosis. Mild effacement of the lateral recesses. No significant foraminal stenosis.    L5-S1: No significant canal or foraminal stenosis.        - MRI Pelvis w.o 6/13/23:  The femoral  heads are well formed and seated within well formed acetabula. Bone marrow signal is normal. The sacroiliac joints are intact. There is no muscle atrophy or edema. There is no intrapelvic visceral abnormalities. Prior hysterectomy. There is no evidence of fluid collection. There is mild right hamstring origin tendinosis. The gluteus medius and minimus tendon insertions are intact. The hip adductors are intact. There is no evidence of iliopsoas or trochanteric bursitis.    Impression:    Mild right hamstring origin tendinosis. Otherwise, negative pelvic MRI.      - X-ray lumbar spine 6/8/23:  Transitional anatomy with sacralization of L5.  Vertebral body heights maintained.  No spondylolisthesis.  Disc spaces maintained.  Mild facet arthropathy.  No acute osseous abnormality.  Bilateral SI joint degenerative changes.  Constipation suspected.    - X-ray pelvis 6/8/23:  Hip joint spaces maintained.  Likely transitional anatomy with L5 sacralization.  Symmetric degenerative findings of the bilateral SI joints.  Constipation findings noted.    - Pelvis CT w/o contrast 1/6/23:  The visualized gastrointestinal tract appears normal.  Urinary bladder is normal.  Inguinal regions are normal.  No masses, fluid collections or adenopathy seen.  1. There is a cystic area on the left side of the pelvis measuring about 5.8 cm. This has a simple cystic appearance to it. Statistically this most likely represents a ovarian cyst. The patient's uterus has apparently beenremoved.  The bony elements appear intact.  Impression:  1. Cyst on the left side of the pelvis. Statistically most likely represents an ovarian cyst. Evaluation with pelvic ultrasound is recommended.    - Pelvic US 12/21/22:  Transabdominal and transvaginal pelvic ultrasound was performed by the sonographer. Static images are submitted. Uterus is not visualized consistent with patient's history of previous partial hysterectomy. Right  ovary is not visualized. Patient  provides a history of previous right oophorectomy. Left ovary measures 3.4 x 3.9 x 2.9 cm in size. Multiple left ovarian follicles are visualized. Small left ovarian cyst is visualized measuring 1.8 cm in diameter. Left ovarian blood flow is present. Trace amount of pelvic free fluid is present adjacent to the vaginal cuff.  IMPRESSION:  1. Nonvisualization of the uterus and right ovary consistent with patient's surgical history.  2. Small 1.8 cm left ovarian cyst.  3. Trace amount of pelvic free fluid.    - MRI C-spine 9/26/22:  FINDINGS:  Reversal of the normal cervical lordotic curvature. The vertebral body heights and alignment are maintained throughout the cervical spine. No abnormal vertebral body marrow signal. Multilevel intervertebral disc desiccation.  Spinal cord is normal in caliber and demonstrates normal signal. The visualized portions of the cervicomedullary junction are unremarkable.  C2-C3: Normal intervertebral disc contour. No central canal or neural foraminal narrowing.  C3-C4: Intervertebral disc bulge narrows the ventral subarachnoid space. No central canal or neural foraminal narrowing.  C4-C5: Intervertebral disc bulge eccentric to the right side. There is narrowing of the ventral subarachnoid space. The central canal is borderline measuring 10 mm. Moderate right and mild left neural foraminal narrowing.   C5-C6: Intervertebral disc bulge with superimposed left paracentral disc protrusion. There is narrowing of the ventral subarachnoid space and left subarticular recess. Bilateral uncovertebral and facet hypertrophy. Moderate bilateral neural foraminal narrowing. The central canal is narrowed to 8 mm.  C6-C7: Intervertebral disc bulge narrows the ventral subarachnoid space. The central canal is narrowed to 9 mm. No neural foraminal narrowing.  C7-T1: Normal intervertebral disc contour. No central canal or neural foraminal narrowing.  The paravertebral soft tissues are  unremarkable.  IMPRESSION:  Multilevel cervical spondylosis most prominent at C4-7. The findings are not significantly changed when compared to the prior exam.    - MRI Lumbar spine 8/17/21:  FINDINGS:  Alignment: Normal.  Vertebral bodies: Normal height and marrow signal.  T12-L1: No significant spinal canal stenosis or neuroforaminal narrowing.  L1-L2: No significant spinal canal stenosis or neuroforaminal narrowing.  L2-L3: No significant spinal canal stenosis or neuroforaminal narrowing.  L3-L4: No significant spinal canal stenosis or neuroforaminal narrowing.  L4-L5: No significant spinal canal stenosis or neuroforaminal narrowing.  L5-S1: No significant spinal canal stenosis or neuroforaminal narrowing.  Spinal cord: The cord extends down to the L1 level. The cord has a normal size, configuration and signal pattern.    - MRI Thoracic spine w/o 6/15/20:  FINDINGS:  Vertebral bodies: Normal vertebral body height, alignment and marrow signal.  C7-T1: The disc is normal. The central canal and neural foramen appear normal. No displacement or compression of the neural elements are seen.  T1-T2: The disc has a normal contour. The central canal and neural foramen appear normal. No displacement or compression of the neural elements are seen.  T2-T3: The disc has a normal contour. The central canal and neural foramen appear normal. No displacement or compression of the neural elements are seen.  T3-T4: The disc has a normal contour. The central canal and neural foramen appear normal. No displacement or compression of the neural elements are seen.  T4-T5: The disc has a normal contour. The central canal and neural foramen appear normal. No displacement or compression of the neural elements are seen.  T5-T6: The disc has a normal contour. The central canal and neural foramen appear normal. No displacement or compression of the neural elements are seen.  T6-T7: The disc has a normal contour. The central canal and neural  foramen appear normal. No displacement or compression of the neural elements are seen.  T7-T8: The disc has a normal contour. The central canal and neural foramen appear normal. No displacement or compression of the neural elements are seen.  T8-T9: The disc has a normal contour. The central canal and neural foramen appear normal. No displacement or compression of the neural elements are seen.  T9-T10: The disc has a normal contour. The central canal and neural foramen appear normal. No displacement or compression of the neural elements are seen.  T10-T11: The disc has a normal contour. The central canal and neural foramen appear normal. No displacement or compression of the neural elements are seen.  T11-T12: The disc has a normal contour. The central canal and neural foramen appear normal. No displacement or compression of the neural elements are seen.  T12-L1: The disc has a normal contour. The central canal and neural foramen appear normal. No displacement or compression of the neural elements are seen.  Spinal cord: The cord has a normal size, configuration and signal pattern.  Additional findings: None seen.      Labs:  BMP  Lab Results   Component Value Date     08/16/2023    K 5.4 (H) 08/16/2023     08/16/2023    CO2 29 08/16/2023    BUN 22.2 (H) 08/16/2023    CREATININE 0.74 08/16/2023    CALCIUM 10.0 08/16/2023    ANIONGAP 9.0 08/16/2023     Lab Results   Component Value Date    AST 16 08/16/2023     Lab Results   Component Value Date     08/16/2023       Assessment:  Mragareth Echeverria is a 43 y.o. female with the following diagnoses based on history, exam, and imaging:    Problem List Items Addressed This Visit    None  Visit Diagnoses       Chronic pain syndrome        Relevant Medications    HYDROcodone-acetaminophen (NORCO) 7.5-325 mg per tablet (Start on 9/15/2023)    Other Relevant Orders    POCT Urine Drug Screen Pain Management    Pain Clinic Drug Screen    Long term (current) use  of opiate analgesic        Relevant Medications    HYDROcodone-acetaminophen (NORCO) 7.5-325 mg per tablet (Start on 9/15/2023)    Other Relevant Orders    POCT Urine Drug Screen Pain Management    Pain Clinic Drug Screen              This is a pleasant 43 y.o. lady presenting with:     - Neck pain and cervicogenic HA. Appears to have occipital neuralgia on left which improved after occipital nerve blocks.   - Bilateral feet pain: Pain primarily over metatarsal areas. Metatarsalgia vs peripheral neuropathy vs radicular pain from lumbar spine.   - Referred to podiatry for further evaluation, pending   - Chronic bilateral low back pain: Pain appears primarily due to SIJ dysfunction on exam, but may have facet and myofascial contributions as well.    - Back pain improved after bilateral SIJ CSI   - She does note some radiation into the legs in L4/5 distribution and MRI with mild canal stenosis at L4-5 with facet arthropathy  - Chronic pelvic pain: Pain appears multifactorial and she has a complex history with prior hysterectomy for endometriosis, interstitial cystitis, b/l oophorectomy for ovarian cyst, prior bilateral inguinal hernia repair and hypophosphatasia. Unclear exactly what the cause of the pain is at this point as she has multiple areas of pain.    - SIJ CSI did help some of the anterior pelvis pain as well  - History of fibromyalgia.   - History of familial hypophosphatasia and h/o polyarthralgia that was   - Comorbidities: Depression. ADHD. Hypophosphatasia. H/o Trigeminal neuralgia. IBS w/ constipation. H/o AV block. Former smoker. Allergy to opioids.     Treatment Plan:   - PT/OT/HEP: Encouraged her to start PT for her SIJ pain and back pain in addition to her pelvic floor therapy.   - Procedures:   - Can repeat left DANY and SHANELLE blocks today in clinic with US guidance w/ 4 mg/mL dexamethasone and lido 1% PRN   - Consider repeat b/l SIJ CSI if back pain returns/worsens  - Consider L4-5 IL ALEKSANDR for  radicular leg pains   - Consider b/l superior hypogastric plexus block for chronic pelvic pain   - Consider ganglion impar and sacrococcygeal injection for coccydynia  - Medications:   - Cont etodolac PRN   - Our plan will be to taper the dose of her opioids by 10-20% per month as possible, though this rate will increase necessarily as overall dose decreases.    - Cont Norco 7.5/325 mg 5x/day. This is 37.5 MME. Plan for decrease to Norco 5/325 mg q 4 hrs PRN which will be a decrease from 37.5 MME to 30 MME.    - Pain contract signed 08/16/2023    -  reviewed  - Imaging: Reviewed.   - Labs: Reviewed.  Medications are appropriately dosed for current hepatorenal function.   - UDS today  - Consulted rheumatology regarding hypophosphatasia, pending    Follow Up: RTC in 2 months or sooner PRN    I spent greater than 40 minutes in total in todays visit including face-to-face time with the patient, and time reviewing records/imaging/labs, and documenting.     Katharine Steve M.D.  Interventional Pain Medicine / Physical Medicine & Rehabilitation

## 2023-09-15 ENCOUNTER — PATIENT MESSAGE (OUTPATIENT)
Dept: FAMILY MEDICINE | Facility: CLINIC | Age: 43
End: 2023-09-15
Payer: COMMERCIAL

## 2023-09-15 ENCOUNTER — PATIENT MESSAGE (OUTPATIENT)
Dept: PAIN MEDICINE | Facility: CLINIC | Age: 43
End: 2023-09-15
Payer: COMMERCIAL

## 2023-09-15 DIAGNOSIS — Z79.891 LONG TERM (CURRENT) USE OF OPIATE ANALGESIC: ICD-10-CM

## 2023-09-15 DIAGNOSIS — G89.4 CHRONIC PAIN SYNDROME: ICD-10-CM

## 2023-09-15 RX ORDER — HYDROCODONE BITARTRATE AND ACETAMINOPHEN 7.5; 325 MG/1; MG/1
1 TABLET ORAL
Qty: 150 TABLET | Refills: 0 | OUTPATIENT
Start: 2023-09-15

## 2023-09-15 RX ORDER — HYDROCODONE BITARTRATE AND ACETAMINOPHEN 7.5; 325 MG/1; MG/1
1 TABLET ORAL
Qty: 150 TABLET | Refills: 0 | Status: SHIPPED | OUTPATIENT
Start: 2023-09-15 | End: 2023-10-13

## 2023-09-15 NOTE — TELEPHONE ENCOUNTER
----- Message from Nadege Bernadette sent at 9/15/2023  2:56 PM CDT -----  Contact: self  Type:  RX Refill Request  Who Called: Margareth Echeverria  Refill or New Rx:refill  RX Name and Strength:HYDROcodone-acetaminophen (NORCO) 7.5-325 mg per tablet 150 tablet 0 9/15/2023 -  Sig: Take 1 tablet by mouth every 4 to 6 hours as needed for Pain.  Route: Oral  How is the patient currently taking it? (ex. 1XDay):1 every 4 to 6 hours  Is this a 30 day or 90 day RX:150  Preferred Pharmacy with phone number:  [facility]/pharmacy #[id number] - [place], [place] - [address]  [address]  [place] [address]  Phone: [phone] Fax: [phone]  Local or Mail Order:local  Ordering Provider: Katharine Steve  Would the patient rather a call back or a response via [facility]? call  Best Call Back Number:[phone]  Additional Information: elba

## 2023-09-21 ENCOUNTER — OFFICE VISIT (OUTPATIENT)
Dept: FAMILY MEDICINE | Facility: CLINIC | Age: 43
End: 2023-09-21
Payer: COMMERCIAL

## 2023-09-21 DIAGNOSIS — R11.0 NAUSEA: ICD-10-CM

## 2023-09-21 DIAGNOSIS — J32.4 CHRONIC PANSINUSITIS: Primary | ICD-10-CM

## 2023-09-21 PROCEDURE — 1160F RVW MEDS BY RX/DR IN RCRD: CPT | Mod: CPTII,95,, | Performed by: NURSE PRACTITIONER

## 2023-09-21 PROCEDURE — 99213 PR OFFICE/OUTPT VISIT, EST, LEVL III, 20-29 MIN: ICD-10-PCS | Mod: 95,,, | Performed by: NURSE PRACTITIONER

## 2023-09-21 PROCEDURE — 1159F PR MEDICATION LIST DOCUMENTED IN MEDICAL RECORD: ICD-10-PCS | Mod: CPTII,95,, | Performed by: NURSE PRACTITIONER

## 2023-09-21 PROCEDURE — 99213 OFFICE O/P EST LOW 20 MIN: CPT | Mod: 95,,, | Performed by: NURSE PRACTITIONER

## 2023-09-21 PROCEDURE — 1159F MED LIST DOCD IN RCRD: CPT | Mod: CPTII,95,, | Performed by: NURSE PRACTITIONER

## 2023-09-21 PROCEDURE — 1160F PR REVIEW ALL MEDS BY PRESCRIBER/CLIN PHARMACIST DOCUMENTED: ICD-10-PCS | Mod: CPTII,95,, | Performed by: NURSE PRACTITIONER

## 2023-09-21 RX ORDER — ONDANSETRON 4 MG/1
4 TABLET, FILM COATED ORAL EVERY 8 HOURS PRN
Qty: 30 TABLET | Refills: 0 | Status: SHIPPED | OUTPATIENT
Start: 2023-09-21

## 2023-09-21 RX ORDER — DOXYCYCLINE 100 MG/1
100 CAPSULE ORAL EVERY 12 HOURS
Qty: 20 CAPSULE | Refills: 0 | Status: SHIPPED | OUTPATIENT
Start: 2023-09-21 | End: 2023-12-13

## 2023-09-21 NOTE — PROGRESS NOTES
"Patient ID: Margareth Echeverria  MRN: [id number]      The patient location is: home  The chief complaint leading to consultation is: see below    Visit type: audiovisual    Face to Face time with patient: 15 min  = minutes of total time spent on the encounter, which includes face to face time and non-face to face time preparing to see the patient (eg, review of tests), Obtaining and/or reviewing separately obtained history, Documenting clinical information in the electronic or other health record, Independently interpreting results (not separately reported) and communicating results to the patient/family/caregiver, or Care coordination (not separately reported).     Each patient to whom he or she provides medical services by telemedicine is:  (1) informed of the relationship between the physician and patient and the respective role of any other health care provider with respect to management of the patient; and (2) notified that he or she may decline to receive medical services by telemedicine and may withdraw from such care at any time.        History of Present Illness:  The patient is a 43 y.o. White female who presents with c/o sinus pain and pressure, nasal congestion, weakness, headache, sweats, low grade fever and full feeling in ears, scratchy throat.   Pt notes she has been sick for "over a month". She has been to urgent care twice and been on zpack and augmentin and steroids. She also received "a antibiotic shot" the first visit to urgent care which was a month ago. Pt reports she feels worse now. She has not tested for COVID since the first visit to . Currently she is only taking Sudafed and tylenol for headache.     Allergies: Patient is allergic to opioids - morphine analogues, bee pollen, morphine, and tramadol.     Smoking status:  Social History     Tobacco Use   Smoking Status Former    Current packs/day: 0.00    Average packs/day: 0.5 packs/day for 10.0 years (5.0 ttl pk-yrs)    Types: Cigarettes    " Start date: 2001    Quit date: 2011    Years since quittin.7    Passive exposure: Never   Smokeless Tobacco Never       Problem List:  Patient Active Problem List   Diagnosis    Attention deficit hyperactivity disorder (ADHD), predominantly inattentive type    Pelvic pain    Chronic SI joint pain    Situational anxiety    Chronic bilateral low back pain without sciatica        Current Medications:  Current Outpatient Medications   Medication Instructions    desonide (DESOWEN) 15 g, Topical (Top), Daily    doxycycline (VIBRAMYCIN) 100 mg, Oral, Every 12 hours    DULoxetine (CYMBALTA) 60 mg, Oral, Daily    etodolac (LODINE) 300 mg, Oral, 2 times daily PRN    HYDROcodone-acetaminophen (NORCO) 7.5-325 mg per tablet 1 tablet, Oral, Every 4-6 hours PRN    levocetirizine (XYZAL) 5 MG tablet No dose, route, or frequency recorded.    lisdexamfetamine (VYVANSE) 20 mg, Oral, Every morning    ondansetron (ZOFRAN) 4 MG tablet TAKE 1 TABLET BY MOUTH EVERY 6 HOURS FOR 10 DAYS    ondansetron (ZOFRAN) 4 mg, Oral, Every 8 hours PRN    progesterone (PROMETRIUM) 100 MG capsule No dose, route, or frequency recorded.    progesterone (PROMETRIUM) 200 MG capsule No dose, route, or frequency recorded.    promethazine (PHENERGAN) 25 MG tablet No dose, route, or frequency recorded.    SYMPROIC 0.2 mg Tab No dose, route, or frequency recorded.    tiZANidine (ZANAFLEX) 2 MG tablet No dose, route, or frequency recorded.             Review of Systems   Constitutional:  Positive for activity change. Negative for unexpected weight change.   HENT:  Positive for rhinorrhea. Negative for hearing loss and trouble swallowing.    Eyes:  Negative for discharge and visual disturbance.   Respiratory:  Negative for chest tightness and wheezing.    Cardiovascular:  Negative for chest pain and palpitations.   Gastrointestinal:  Positive for constipation. Negative for blood in stool, diarrhea and vomiting.   Endocrine: Negative for polydipsia  and polyuria.   Genitourinary:  Negative for difficulty urinating, dysuria, hematuria and menstrual problem.   Musculoskeletal:  Positive for arthralgias, joint swelling and neck pain.   Neurological:  Positive for weakness and headaches.   Psychiatric/Behavioral:  Negative for confusion and dysphoric mood.         There were no vitals taken for this visit.    Physical Exam  Constitutional:       Comments: Appears fatigued.    Pulmonary:      Effort: Pulmonary effort is normal.   Neurological:      Mental Status: She is oriented to person, place, and time.   Psychiatric:         Mood and Affect: Mood normal.          Assessment & Plan:  1. Chronic pansinusitis  -     doxycycline (VIBRAMYCIN) 100 MG Cap; Take 1 capsule (100 mg total) by mouth every 12 (twelve) hours.  Dispense: 20 capsule; Refill: 0  Instructed her to take with food to prevent gi upset. I have also asked that she have someone get her a home covid test as her sx do sound like they very well could be covid. She could have had sinusitis, or bronchitis in the past and now covid . Increase fluids, rest, alternate 1000 mg of tylenol with 600 mg of ibuprofen q 4 hrs for headache. Call within the next few days if she is not improving, may need chest xray.       2. Nausea  -     ondansetron (ZOFRAN) 4 MG tablet; Take 1 tablet (4 mg total) by mouth every 8 (eight) hours as needed for Nausea.  Dispense: 30 tablet; Refill: 0  May also try pepcid ac otc , increasing fluids, bland diet.        Future Appointments   Date Time Provider Department Center   9/21/2023  4:30 PM Natividad Galeas NP [facility] [address]   11/14/2023 11:00 AM Katharine Steve MD [facility]       Lab Frequency Next Occurrence   Ambulatory referral/consult to Physical/Occupational Therapy Once 06/15/2023   Ambulatory referral/consult to Podiatry Once 07/06/2023   Ambulatory referral/consult to Rheumatology Once 08/23/2023   POCT Urine Drug Screen Pain Management Once  08/17/2023       Follow up if symptoms worsen or fail to improve.      Natividad Galeas, NP

## 2023-10-10 ENCOUNTER — PATIENT MESSAGE (OUTPATIENT)
Dept: PAIN MEDICINE | Facility: CLINIC | Age: 43
End: 2023-10-10
Payer: COMMERCIAL

## 2023-10-10 ENCOUNTER — PATIENT MESSAGE (OUTPATIENT)
Dept: RHEUMATOLOGY | Facility: CLINIC | Age: 43
End: 2023-10-10
Payer: COMMERCIAL

## 2023-10-10 DIAGNOSIS — E83.39 ADULT HYPOPHOSPHATASIA: Primary | ICD-10-CM

## 2023-10-13 ENCOUNTER — OFFICE VISIT (OUTPATIENT)
Dept: PAIN MEDICINE | Facility: CLINIC | Age: 43
End: 2023-10-13
Payer: COMMERCIAL

## 2023-10-13 VITALS
OXYGEN SATURATION: 98 % | SYSTOLIC BLOOD PRESSURE: 113 MMHG | HEART RATE: 95 BPM | WEIGHT: 115 LBS | BODY MASS INDEX: 19.63 KG/M2 | DIASTOLIC BLOOD PRESSURE: 74 MMHG | HEIGHT: 64 IN

## 2023-10-13 DIAGNOSIS — M54.12 CERVICAL RADICULOPATHY: ICD-10-CM

## 2023-10-13 DIAGNOSIS — M54.2 CERVICALGIA: ICD-10-CM

## 2023-10-13 DIAGNOSIS — Z79.891 LONG TERM (CURRENT) USE OF OPIATE ANALGESIC: ICD-10-CM

## 2023-10-13 DIAGNOSIS — M79.18 MYOFASCIAL PAIN: ICD-10-CM

## 2023-10-13 DIAGNOSIS — G89.4 CHRONIC PAIN SYNDROME: ICD-10-CM

## 2023-10-13 DIAGNOSIS — M54.81 OCCIPITAL NEURALGIA OF LEFT SIDE: ICD-10-CM

## 2023-10-13 DIAGNOSIS — M54.2 CHRONIC NECK PAIN: ICD-10-CM

## 2023-10-13 DIAGNOSIS — M54.81 CERVICO-OCCIPITAL NEURALGIA: Primary | ICD-10-CM

## 2023-10-13 DIAGNOSIS — G89.29 CHRONIC NECK PAIN: ICD-10-CM

## 2023-10-13 PROCEDURE — 64450 NJX AA&/STRD OTHER PN/BRANCH: CPT | Mod: 59,LT,S$GLB, | Performed by: PHYSICAL MEDICINE & REHABILITATION

## 2023-10-13 PROCEDURE — 20553 TRIGGER POINT INJECTION: ICD-10-PCS | Mod: 59,S$GLB,, | Performed by: PHYSICAL MEDICINE & REHABILITATION

## 2023-10-13 PROCEDURE — 64405 NJX AA&/STRD GR OCPL NRV: CPT | Mod: LT,S$GLB,, | Performed by: PHYSICAL MEDICINE & REHABILITATION

## 2023-10-13 PROCEDURE — 1159F PR MEDICATION LIST DOCUMENTED IN MEDICAL RECORD: ICD-10-PCS | Mod: CPTII,S$GLB,, | Performed by: PHYSICAL MEDICINE & REHABILITATION

## 2023-10-13 PROCEDURE — 64450 NERVE BLOCK: ICD-10-PCS | Mod: 59,LT,S$GLB, | Performed by: PHYSICAL MEDICINE & REHABILITATION

## 2023-10-13 PROCEDURE — 1159F MED LIST DOCD IN RCRD: CPT | Mod: CPTII,S$GLB,, | Performed by: PHYSICAL MEDICINE & REHABILITATION

## 2023-10-13 PROCEDURE — 3074F SYST BP LT 130 MM HG: CPT | Mod: CPTII,S$GLB,, | Performed by: PHYSICAL MEDICINE & REHABILITATION

## 2023-10-13 PROCEDURE — 3074F PR MOST RECENT SYSTOLIC BLOOD PRESSURE < 130 MM HG: ICD-10-PCS | Mod: CPTII,S$GLB,, | Performed by: PHYSICAL MEDICINE & REHABILITATION

## 2023-10-13 PROCEDURE — 3078F DIAST BP <80 MM HG: CPT | Mod: CPTII,S$GLB,, | Performed by: PHYSICAL MEDICINE & REHABILITATION

## 2023-10-13 PROCEDURE — 99214 PR OFFICE/OUTPT VISIT, EST, LEVL IV, 30-39 MIN: ICD-10-PCS | Mod: 25,S$GLB,, | Performed by: PHYSICAL MEDICINE & REHABILITATION

## 2023-10-13 PROCEDURE — 1160F PR REVIEW ALL MEDS BY PRESCRIBER/CLIN PHARMACIST DOCUMENTED: ICD-10-PCS | Mod: CPTII,S$GLB,, | Performed by: PHYSICAL MEDICINE & REHABILITATION

## 2023-10-13 PROCEDURE — 3008F PR BODY MASS INDEX (BMI) DOCUMENTED: ICD-10-PCS | Mod: CPTII,S$GLB,, | Performed by: PHYSICAL MEDICINE & REHABILITATION

## 2023-10-13 PROCEDURE — 20553 NJX 1/MLT TRIGGER POINTS 3/>: CPT | Mod: 59,S$GLB,, | Performed by: PHYSICAL MEDICINE & REHABILITATION

## 2023-10-13 PROCEDURE — 3078F PR MOST RECENT DIASTOLIC BLOOD PRESSURE < 80 MM HG: ICD-10-PCS | Mod: CPTII,S$GLB,, | Performed by: PHYSICAL MEDICINE & REHABILITATION

## 2023-10-13 PROCEDURE — 3008F BODY MASS INDEX DOCD: CPT | Mod: CPTII,S$GLB,, | Performed by: PHYSICAL MEDICINE & REHABILITATION

## 2023-10-13 PROCEDURE — 1160F RVW MEDS BY RX/DR IN RCRD: CPT | Mod: CPTII,S$GLB,, | Performed by: PHYSICAL MEDICINE & REHABILITATION

## 2023-10-13 PROCEDURE — 64405 NERVE BLOCK: ICD-10-PCS | Mod: LT,S$GLB,, | Performed by: PHYSICAL MEDICINE & REHABILITATION

## 2023-10-13 PROCEDURE — 99214 OFFICE O/P EST MOD 30 MIN: CPT | Mod: 25,S$GLB,, | Performed by: PHYSICAL MEDICINE & REHABILITATION

## 2023-10-13 RX ORDER — METHYLPREDNISOLONE ACETATE 40 MG/ML
40 INJECTION, SUSPENSION INTRA-ARTICULAR; INTRALESIONAL; INTRAMUSCULAR; SOFT TISSUE
Status: DISCONTINUED | OUTPATIENT
Start: 2023-10-13 | End: 2023-10-13 | Stop reason: HOSPADM

## 2023-10-13 RX ORDER — HYDROCODONE BITARTRATE AND ACETAMINOPHEN 5; 325 MG/1; MG/1
1 TABLET ORAL EVERY 4 HOURS PRN
Qty: 180 TABLET | Refills: 0 | Status: SHIPPED | OUTPATIENT
Start: 2023-10-15 | End: 2023-10-18 | Stop reason: SDUPTHER

## 2023-10-13 RX ORDER — TIZANIDINE 2 MG/1
4-8 TABLET ORAL NIGHTLY PRN
Qty: 60 TABLET | Refills: 2 | Status: SHIPPED | OUTPATIENT
Start: 2023-10-13 | End: 2023-12-13 | Stop reason: SDUPTHER

## 2023-10-13 RX ORDER — LIDOCAINE HYDROCHLORIDE 10 MG/ML
10 INJECTION, SOLUTION EPIDURAL; INFILTRATION; INTRACAUDAL; PERINEURAL
Status: DISCONTINUED | OUTPATIENT
Start: 2023-10-13 | End: 2023-10-13 | Stop reason: HOSPADM

## 2023-10-13 RX ADMIN — METHYLPREDNISOLONE ACETATE 40 MG: 40 INJECTION, SUSPENSION INTRA-ARTICULAR; INTRALESIONAL; INTRAMUSCULAR; SOFT TISSUE at 11:10

## 2023-10-13 RX ADMIN — LIDOCAINE HYDROCHLORIDE 10 ML: 10 INJECTION, SOLUTION EPIDURAL; INFILTRATION; INTRACAUDAL; PERINEURAL at 11:10

## 2023-10-13 NOTE — PROCEDURES
"Nerve Block    Date/Time: 10/13/2023 11:00 AM    Performed by: Katharine Steve MD  Authorized by: Katharine Steve MD  Consent Done: Yes  Time out: Immediately prior to procedure a "time out" was called to verify the correct patient, procedure, equipment, support staff and site/side marked as required.  Indications: pain relief  Body area: head  Nerve: greater occipital  Laterality: left    Patient sedated: no  Medications administered: DepoMedrol 40 mg injection  Preparation: Patient was prepped and draped in the usual sterile fashion.  Patient position: sitting  Needle size: 25 G  Location technique: anatomical landmarks  Local Anesthetic: lidocaine 1% without epinephrine  Anesthetic total: 1 mL  Outcome: pain improved  Patient tolerance: Patient tolerated the procedure well with no immediate complications        "

## 2023-10-13 NOTE — PROCEDURES
Trigger Point Injection    Performed by: Katharine Steve MD  Authorized by: Katharine Steve MD    Cervical Paraspinal:  Left  Trapezus:  Bilateral    Consent Done?:  Yes (Written)    Pre-Procedure:   Indications:  Pain  Site marked: the procedure site was marked     Timeout: prior to procedure the correct patient, procedure, and site was verified      Local anesthesia used?: Yes    Anesthesia:  Local infiltration  Local anesthetic:  Lidocaine 1% without epinephrine  Medications: 10 mL LIDOcaine (PF) 10 mg/ml (1%) 10 mg/mL (1 %); 40 mg methylPREDNISolone acetate 40 mg/mL  Parascapular:  Left

## 2023-10-13 NOTE — PROGRESS NOTES
[facility]      Chief Complaint:   Chief Complaint   Patient presents with    Occipital neuralgia        History of Present Illness: Margareth Echeverria is a 43 y.o. female referred by Dr. Nelda Thornton for Pelvic pain.      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 and she has a history of chronic pelvic pain and LBP with prior hysterectomy and b/l oophorectomy and she was feeling pretty good in regards to her chronic pelvic pain until Mother's day where she developed rather sudden increase in her pelvic pain with no clear inciting event  Location: involves her entire pelvis, but seems to be primarily localized over her sacrum area  Radiation: She feels some radiation into her anterior thighs  Timing: constant  Quality: Aching, Throbbing, Pounding, Deep, Stabbing, and nagging and swelling  Exacerbating Factors: Bowel movements, valsalva, intercourse, sitting, standing prolonged  Alleviating Factors: nothing  Associated Symptoms: She has neuropathy in her legs/feet from prior to this incident. She feels weakness in her legs. She has chronic constipation, but has been having daily BMs. She does note recent weight loss (states about 30 lbs). She does note night sweats. She denies fevers, urinary incontinence/change in function and bowel incontinence/change in function    Nothing seems to be helping, she has tried heating, cold. She saw gynecology and was sent here and to pelvic floor rehab.     She denies aggravation with urination.     Severity: Currently: 8/10   Typical Range: 5-10/10     Exacerbation: 10/10     P = 8  E = 10  G = 10  Baseline PEG Score = 9.33    Interval History (06/29/2023):  Margareth Echeverria returns today for follow up.  At the last clinic visit, scheduled SIJ.    SIJ provided > 50% relief. However, the area of the needle insertion was slightly erythematous. She went swimming the day after the injection and suspects that may have  contributed. She rubbed her eczema cream on it and it seemed to help, but still slightly red. She denies fevers, chills, drainage.     Currently, the back pain is improved.  She has had some insomnia since the steroid injection. She does note some radiating pains into the legs that     Etodolac was sent in since last visit, and she finds that helps as well.     She is mostly noting bilateral feet pain today. The pain is primarily in the metatarsal area, but can radiate up her ankles at times. Pain seems pretty constant, but worse with walking. She states she has been diagnosed with neuropathy in the past, but this pain seems different.     Current Pain Scales:  Current: 5/10              Typical Range: 3-7/10      Interval History (08/16/2023):  Margareth Echeverria returns today for follow up.  At the last clinic visit, encouraged her to start PT which has not been done due to back pain improved and dealing with stress from her current pain medicine physician.     Currently, the lower back pain is improved.  She is mostly having HA pain over the left side of her scalp. Pain radiates from the occiput to her parietal region and is constant and has been present for days. Stress seems to be aggravating it. She states she has occipital neuralgia and typically gets injections of her occipital nerves which helps her HA. Pain has been worsening because she has been under a lot of stress. Her current physician managing her pain medicine, Dr. Anaya, has discharged her from clinic for no reason due to her having a procedure (SIJ CSI) with this clinic which helped her. She did nothing to breach her pain contract, but she has been having more difficulty with sleeping and worsening pain with all the stress he has been putting her through. She is prescribed hydrocodone/APAP 10/325 mg QID PRN and states she was not needing to take it as often until he discharged her out of the blue, just because she had her procedure done with this  clinic instead of with the interventional pain physicians at the clinic that Dr. Anaya works.     She also notes continued bilateral feet pain, but now pain worse over bilateral heels and radiates anteriorly and worst 1st in AM.     Current Pain Scales:  Current: 9/10              Typical Range: 7-10/10     Interval History (09/13/2023):  Margareth Echeverria returns today for follow up.  At the last clinic visit, patient received left DANY and SHANELLE block in clinic with US guidance w/ 4 mg/mL dexamethasone and lido 1% which provided nearly complete relief.     Currently, her chronic pain is improved.      Patient is currently taking Norco and Erodolac 300mg which provides 75% relief. Patient states that although the Erodolac 300mg helps, it does cause an occasional headache when she takes close to her hydocodone. She is currently anxious about decreasing her dose further and would like to go one more month prior to continuing her taper.     Current Pain Scales:  Current: 5/10              Typical Range: 6-7/10       Interval History (10/13/2023):  Margareth Echeverria returns today with pain secondary to occipital neuralgia.     She is mostly having the pain in the left cervico-occipital region and this radiates into the left trapezius and down the left arm with numbness and tingling into the left hand. She denies any clear changes in strength recently. She denies changes in b/b function.     Patient would also like to refill medications while here. She has been doing okay with the lower dose of the hydrocodone and is ready to decrease from 7.5/325 mg 5x/day to 5/325 mg 6x/day. She denies side effects.     Current Pain Scales:  Current: 8/10              Typical Range: 9-10/10     Current PEG Score: 7.33    Opioid Risk Score         Value Time User    Opioid Risk Score  7 6/8/2023 10:19 AM Katharine Steve MD           Previous Interventions:  - 8/16/23: left DANY and SHANELLE block in clinic with US guidance w/ 4 mg/mL  dexamethasone and lido 1% which provided nearly complete relief.   - 6/23/23: B/L SIJ CSI w/ > 50% relief in pain and improved fxn.   - Procedures for neck and back pain, but not for pelvic pain    Previous Therapies:  PT/OT: yes for back pain but not pelvic floor  Relevant Surgery: yes    - surgery for trigeminal neuralgia  Previous Medications:   - NSAIDS:   - Muscle Relaxants: Zanaflex 2mg   - TCAs:   - SNRIs: cymbalta  - Topicals: CBD  - Anticonvulsants:    - Opioids: hydrocodone    Current Pain Medications:  Cymbalta 60mg  Norco 7.5/325mg   Tizanidine 2 mg   Etodolac    Blood Thinners: No    Full Medication List:    Current Outpatient Medications:     desonide (DESOWEN) 0.05 % cream, Apply 15 g topically once daily., Disp: , Rfl:     DULoxetine (CYMBALTA) 60 MG capsule, Take 1 capsule (60 mg total) by mouth once daily. (Patient taking differently: Take 30 mg by mouth once daily.), Disp: 30 capsule, Rfl: 11    etodolac (LODINE) 300 MG Cap, TAKE 1 CAPSULE (300 MG TOTAL) BY MOUTH 2 (TWO) TIMES DAILY AS NEEDED (PAIN AND INFLAMMATION)., Disp: 60 capsule, Rfl: 0    levocetirizine (XYZAL) 5 MG tablet, , Disp: , Rfl:     lisdexamfetamine (VYVANSE) 20 MG capsule, Take 1 capsule (20 mg total) by mouth every morning., Disp: 30 capsule, Rfl: 0    ondansetron (ZOFRAN) 4 MG tablet, Take 1 tablet (4 mg total) by mouth every 8 (eight) hours as needed for Nausea., Disp: 30 tablet, Rfl: 0    progesterone (PROMETRIUM) 100 MG capsule, , Disp: , Rfl:     progesterone (PROMETRIUM) 200 MG capsule, , Disp: , Rfl:     doxycycline (VIBRAMYCIN) 100 MG Cap, Take 1 capsule (100 mg total) by mouth every 12 (twelve) hours. (Patient not taking: Reported on 10/13/2023), Disp: 20 capsule, Rfl: 0    [START ON 10/15/2023] HYDROcodone-acetaminophen (NORCO) 5-325 mg per tablet, Take 1 tablet by mouth every 4 (four) hours as needed for Pain., Disp: 180 tablet, Rfl: 0    ondansetron (ZOFRAN) 4 MG tablet, TAKE 1 TABLET BY MOUTH EVERY 6 HOURS FOR 10  "DAYS, Disp: , Rfl:     promethazine (PHENERGAN) 25 MG tablet, , Disp: , Rfl:     SYMPROIC 0.2 mg Tab, , Disp: , Rfl:     tiZANidine (ZANAFLEX) 2 MG tablet, Take 2-4 tablets (4-8 mg total) by mouth nightly as needed (pain and sleep)., Disp: 60 tablet, Rfl: 2     Review of Systems:  ROS    Allergies:  Opioids - morphine analogues, Bee pollen, Morphine, and Tramadol     Medical History:   has a past medical history of ADHD (attention deficit hyperactivity disorder), Adult hypophosphatasia, AV block, Fibromyalgia, IBS (irritable bowel syndrome), IC (interstitial cystitis), Occipital neuralgia, and Trigeminal neuralgia.    Surgical History:   has a past surgical history that includes Inguinal hernia repair (Bilateral); Laparoscopic cholecystectomy; Augmentation of breast; Fulguration of endometriosis (07/05/2016); Laparoscopy-assisted vaginal hysterectomy (11/11/2019); Laparoscopy (12/08/2019); Appendectomy; Cystoscopy with hydrodistension and biopsy of bladder (12/28/2020); Removal of breast implant (04/2021); and Hysterectomy.    Family History:  family history includes Breast cancer in her maternal cousin; Lung cancer in her paternal grandmother; No Known Problems in her father and mother.    Social History:   reports that she quit smoking about 11 years ago. Her smoking use included cigarettes. She started smoking about 21 years ago. She has a 5.0 pack-year smoking history. She has never been exposed to tobacco smoke. She has never used smokeless tobacco. She reports current alcohol use. She reports current drug use. Drug: Marijuana.    Physical Exam:  /74   Pulse 95   Ht 5' 4" (1.626 m)   Wt 52.2 kg (115 lb)   SpO2 98%   BMI 19.74 kg/m²   GEN: No acute distress. Calm, comfortable  HENT: Normocephalic, atraumatic, moist mucous membranes  EYE: Anicteric sclera, non-injected.   CV: Non-diaphoretic. Regular Rate. Radial Pulses 2+.  RESP: Breathing comfortably. Chest expansion symmetric.  EXT: No clubbing, " cyanosis.   SKIN: Warm, & dry to palpation. No visible rashes or lesions of exposed skin.   PSYCH: Pleasant mood and appropriate affect. Recent and remote memory intact.   GAIT: Independent, normal ambulation  Neck Exam:       Inspection: No erythema, bruising.       Palpation: (+) TTP of left cervical paraspinals and over SHANELLE and DANY      ROM: No significant Limitation in flexion, extension, lateral bending or rotation      Provocative Maneuvers:  (-) Spurling's bilaterally  Lumbar Spine Exam:       Inspection: No bruising, swelling. Slight erythema noted at bilateral injection sites.        Palpation: No calor to the area. (+) TTP of b/l SIJ > lumbar paraspinals b/l. (+) TTP of sacrum and coccyx         ROM:  Limited in flexion, extension, lateral bending.       (+) Facet loading bilaterally      (-) Straight Leg Raise bilaterally      (+) ISABELLA bilaterally      (+) SIJ Compression Test bilaterally      (+) Gaenslan's Test bilaterally      (+) Thigh thrust/Posterior Pelvic Pain Provocation Test bilaterally      (+) Sacral thrust  Hip/Pelvis Exam:      Inspection: No gross deformity or apparent leg length discrepancy      Palpation: (+) TTP along inguinal ligaments b/l, pubic symphysis. (+) TTP right ischial bursa and b/l piriformis and gluteal muscles, (-) TTP to bilateral greater trochanteric bursas.       ROM:  No limitation or pain in internal rotation, external rotation b/l  Neurologic Exam:     Alert. Speech is fluent and appropriate.     Strength: 4/5 in left AbDM, wrist extension, 4/5 diffusely in the left leg, otherwise 5/5 throughout bilateral upper and lower extremities     Sensation:  Grossly intact to light touch in bilateral upper and lower extremities     Reflexes: 2+ in b/l BR, biceps, triceps, patella, achilles     Tone: No abnormality appreciated in bilateral lower extremities     No Clonus     Downgoing toes on plantar stimulation     (-) Franco bilaterally                Imaging:  - MRI L-spine  6/13/23:  Vertebral body alignment is preserved. Vertebral body heights are maintained. Bone marrow signal is within normal limits. The conus medullaris terminates normally at the level of L2. Lumbar nerve roots have normal appearance. Preserved intervertebral disc space height and signal.    T12-L1: No significant canal or foraminal stenosis.    L1-L2: No significant canal or foraminal stenosis.    L2-L3: No significant canal or foraminal stenosis.    L3-L4: No significant canal or foraminal stenosis.    L4-L5: Broad-based disc bulge, ligamentum flavum thickening, facet hypertrophy with at most mild canal stenosis. Mild effacement of the lateral recesses. No significant foraminal stenosis.    L5-S1: No significant canal or foraminal stenosis.        - MRI Pelvis w.o 6/13/23:  The femoral heads are well formed and seated within well formed acetabula. Bone marrow signal is normal. The sacroiliac joints are intact. There is no muscle atrophy or edema. There is no intrapelvic visceral abnormalities. Prior hysterectomy. There is no evidence of fluid collection. There is mild right hamstring origin tendinosis. The gluteus medius and minimus tendon insertions are intact. The hip adductors are intact. There is no evidence of iliopsoas or trochanteric bursitis.    Impression:    Mild right hamstring origin tendinosis. Otherwise, negative pelvic MRI.      - X-ray lumbar spine 6/8/23:  Transitional anatomy with sacralization of L5.  Vertebral body heights maintained.  No spondylolisthesis.  Disc spaces maintained.  Mild facet arthropathy.  No acute osseous abnormality.  Bilateral SI joint degenerative changes.  Constipation suspected.    - X-ray pelvis 6/8/23:  Hip joint spaces maintained.  Likely transitional anatomy with L5 sacralization.  Symmetric degenerative findings of the bilateral SI joints.  Constipation findings noted.    - Pelvis CT w/o contrast 1/6/23:  The visualized gastrointestinal tract appears  normal.  Urinary bladder is normal.  Inguinal regions are normal.  No masses, fluid collections or adenopathy seen.  1. There is a cystic area on the left side of the pelvis measuring about 5.8 cm. This has a simple cystic appearance to it. Statistically this most likely represents a ovarian cyst. The patient's uterus has apparently beenremoved.  The bony elements appear intact.  Impression:  1. Cyst on the left side of the pelvis. Statistically most likely represents an ovarian cyst. Evaluation with pelvic ultrasound is recommended.    - Pelvic US 12/21/22:  Transabdominal and transvaginal pelvic ultrasound was performed by the sonographer. Static images are submitted. Uterus is not visualized consistent with patient's history of previous partial hysterectomy. Right  ovary is not visualized. Patient provides a history of previous right oophorectomy. Left ovary measures 3.4 x 3.9 x 2.9 cm in size. Multiple left ovarian follicles are visualized. Small left ovarian cyst is visualized measuring 1.8 cm in diameter. Left ovarian blood flow is present. Trace amount of pelvic free fluid is present adjacent to the vaginal cuff.  IMPRESSION:  1. Nonvisualization of the uterus and right ovary consistent with patient's surgical history.  2. Small 1.8 cm left ovarian cyst.  3. Trace amount of pelvic free fluid.    - MRI C-spine 9/26/22:  FINDINGS:  Reversal of the normal cervical lordotic curvature. The vertebral body heights and alignment are maintained throughout the cervical spine. No abnormal vertebral body marrow signal. Multilevel intervertebral disc desiccation.  Spinal cord is normal in caliber and demonstrates normal signal. The visualized portions of the cervicomedullary junction are unremarkable.  C2-C3: Normal intervertebral disc contour. No central canal or neural foraminal narrowing.  C3-C4: Intervertebral disc bulge narrows the ventral subarachnoid space. No central canal or neural foraminal narrowing.  C4-C5:  Intervertebral disc bulge eccentric to the right side. There is narrowing of the ventral subarachnoid space. The central canal is borderline measuring 10 mm. Moderate right and mild left neural foraminal narrowing.   C5-C6: Intervertebral disc bulge with superimposed left paracentral disc protrusion. There is narrowing of the ventral subarachnoid space and left subarticular recess. Bilateral uncovertebral and facet hypertrophy. Moderate bilateral neural foraminal narrowing. The central canal is narrowed to 8 mm.  C6-C7: Intervertebral disc bulge narrows the ventral subarachnoid space. The central canal is narrowed to 9 mm. No neural foraminal narrowing.  C7-T1: Normal intervertebral disc contour. No central canal or neural foraminal narrowing.  The paravertebral soft tissues are unremarkable.  IMPRESSION:  Multilevel cervical spondylosis most prominent at C4-7. The findings are not significantly changed when compared to the prior exam.    - MRI Lumbar spine 8/17/21:  FINDINGS:  Alignment: Normal.  Vertebral bodies: Normal height and marrow signal.  T12-L1: No significant spinal canal stenosis or neuroforaminal narrowing.  L1-L2: No significant spinal canal stenosis or neuroforaminal narrowing.  L2-L3: No significant spinal canal stenosis or neuroforaminal narrowing.  L3-L4: No significant spinal canal stenosis or neuroforaminal narrowing.  L4-L5: No significant spinal canal stenosis or neuroforaminal narrowing.  L5-S1: No significant spinal canal stenosis or neuroforaminal narrowing.  Spinal cord: The cord extends down to the L1 level. The cord has a normal size, configuration and signal pattern.    - MRI Thoracic spine w/o 6/15/20:  FINDINGS:  Vertebral bodies: Normal vertebral body height, alignment and marrow signal.  C7-T1: The disc is normal. The central canal and neural foramen appear normal. No displacement or compression of the neural elements are seen.  T1-T2: The disc has a normal contour. The central  canal and neural foramen appear normal. No displacement or compression of the neural elements are seen.  T2-T3: The disc has a normal contour. The central canal and neural foramen appear normal. No displacement or compression of the neural elements are seen.  T3-T4: The disc has a normal contour. The central canal and neural foramen appear normal. No displacement or compression of the neural elements are seen.  T4-T5: The disc has a normal contour. The central canal and neural foramen appear normal. No displacement or compression of the neural elements are seen.  T5-T6: The disc has a normal contour. The central canal and neural foramen appear normal. No displacement or compression of the neural elements are seen.  T6-T7: The disc has a normal contour. The central canal and neural foramen appear normal. No displacement or compression of the neural elements are seen.  T7-T8: The disc has a normal contour. The central canal and neural foramen appear normal. No displacement or compression of the neural elements are seen.  T8-T9: The disc has a normal contour. The central canal and neural foramen appear normal. No displacement or compression of the neural elements are seen.  T9-T10: The disc has a normal contour. The central canal and neural foramen appear normal. No displacement or compression of the neural elements are seen.  T10-T11: The disc has a normal contour. The central canal and neural foramen appear normal. No displacement or compression of the neural elements are seen.  T11-T12: The disc has a normal contour. The central canal and neural foramen appear normal. No displacement or compression of the neural elements are seen.  T12-L1: The disc has a normal contour. The central canal and neural foramen appear normal. No displacement or compression of the neural elements are seen.  Spinal cord: The cord has a normal size, configuration and signal pattern.  Additional findings: None seen.      Labs:  BMP  Lab  Results   Component Value Date     08/16/2023    K 5.4 (H) 08/16/2023     08/16/2023    CO2 29 08/16/2023    BUN 22.2 (H) 08/16/2023    CREATININE 0.74 08/16/2023    CALCIUM 10.0 08/16/2023    ANIONGAP 9.0 08/16/2023     Lab Results   Component Value Date    AST 16 08/16/2023     Lab Results   Component Value Date     08/16/2023       Assessment:  Margareth Echeverria is a 43 y.o. female with the following diagnoses based on history, exam, and imaging:    Problem List Items Addressed This Visit    None  Visit Diagnoses       Cervico-occipital neuralgia    -  Primary    Relevant Medications    tiZANidine (ZANAFLEX) 2 MG tablet    Other Relevant Orders    X-Ray Cervical Spine AP And Lateral    Ambulatory referral/consult to Physical/Occupational Therapy    Nerve Block    Nerve Block    Cervical radiculopathy        Relevant Medications    tiZANidine (ZANAFLEX) 2 MG tablet    Other Relevant Orders    Ambulatory referral/consult to Physical/Occupational Therapy    Chronic pain syndrome        Relevant Medications    HYDROcodone-acetaminophen (NORCO) 5-325 mg per tablet (Start on 10/15/2023)    tiZANidine (ZANAFLEX) 2 MG tablet    Long term (current) use of opiate analgesic        Relevant Medications    HYDROcodone-acetaminophen (NORCO) 5-325 mg per tablet (Start on 10/15/2023)    tiZANidine (ZANAFLEX) 2 MG tablet    Chronic neck pain        Relevant Medications    tiZANidine (ZANAFLEX) 2 MG tablet    Other Relevant Orders    X-Ray Cervical Spine AP And Lateral    Ambulatory referral/consult to Physical/Occupational Therapy    Cervicalgia        Relevant Orders    Trigger Point Injection    Myofascial pain        Relevant Orders    Trigger Point Injection    Occipital neuralgia of left side        Relevant Orders    Nerve Block    Nerve Block                This is a pleasant 43 y.o. lady presenting with:     - Neck pain and cervicogenic HA and left radicular arm pain. Appears to have occipital neuralgia  on left which improved after occipital nerve blocks. She also has some myofascial pain on exam.   - Chronic bilateral low back pain: Pain appears primarily due to SIJ dysfunction on exam, but may have facet and myofascial contributions as well.    - Back pain improved after bilateral SIJ CSI   - She does note some radiation into the legs in L4/5 distribution and MRI with mild canal stenosis at L4-5 with facet arthropathy  - Chronic pelvic pain: Pain appears multifactorial and she has a complex history with prior hysterectomy for endometriosis, interstitial cystitis, b/l oophorectomy for ovarian cyst, prior bilateral inguinal hernia repair and hypophosphatasia. Unclear exactly what the cause of the pain is at this point as she has multiple areas of pain.    - SIJ CSI did help some of the anterior pelvis pain as well  - History of fibromyalgia.   - History of familial hypophosphatasia and h/o polyarthralgia that was   - Comorbidities: Depression. ADHD. Hypophosphatasia. H/o Trigeminal neuralgia. IBS w/ constipation. H/o AV block. Former smoker. Allergy to opioids.     Treatment Plan:   - PT/OT/HEP: Referred to PT for neck pain. Encouraged her to start PT for her SIJ pain and back pain in addition to her pelvic floor therapy.    - Discussed benefits of exercise for pain.   - Procedures: Performed left DANY and SHANELLE blocks today in clinic and bilateral cervical TPIs with 40 mg/mL depo-medrol and 9 mL of lido 1% PRN. 1 mL utilized over each of the occipital nerves and remainder utilized for TPIs.    - Consider repeat b/l SIJ CSI if back pain returns/worsens  - Consider L4-5 IL ALEKSANDR for radicular leg pains   - Consider b/l superior hypogastric plexus block for chronic pelvic pain   - Consider ganglion impar and sacrococcygeal injection for coccydynia  - Medications:   - Cont etodolac PRN   - Our plan will be to taper the dose of her opioids by 10-20% per month as possible, though this rate will increase necessarily as  overall dose decreases.    - Decrease Norco 7.5/325 mg 5x/day to 5/325 mg q 4 hrs PRN pain. This is 30 MME.    - Pain contract signed 08/16/2023    -  reviewed  - Imaging: Reviewed. X-ray c-spine.   - Labs: Reviewed.  Medications are appropriately dosed for current hepatorenal function.  - Consulted rheumatology regarding hypophosphatasia, pending    Follow Up: RTC in 2 months or sooner PRN    I spent greater than 30 minutes in total in todays visit including face-to-face time with the patient, and time reviewing records/imaging/labs, and documenting.     Katharine Steve M.D.  Interventional Pain Medicine / Physical Medicine & Rehabilitation

## 2023-10-13 NOTE — PROCEDURES
"Nerve Block    Date/Time: 10/13/2023 11:00 AM    Performed by: Katharine Steve MD  Authorized by: Katharine Steve MD  Consent Done: Yes  Time out: Immediately prior to procedure a "time out" was called to verify the correct patient, procedure, equipment, support staff and site/side marked as required.  Indications: pain relief  Body area: head  Nerve: lesser occipital  Laterality: left    Patient sedated: no  Medications administered: DepoMedrol 40 mg injection  Preparation: Patient was prepped and draped in the usual sterile fashion.  Patient position: sitting  Needle size: 25 G  Location technique: anatomical landmarks  Local Anesthetic: lidocaine 1% without epinephrine  Anesthetic total: 1 mL  Outcome: pain improved  Patient tolerance: Patient tolerated the procedure well with no immediate complications        "

## 2023-10-17 ENCOUNTER — PATIENT MESSAGE (OUTPATIENT)
Dept: PAIN MEDICINE | Facility: CLINIC | Age: 43
End: 2023-10-17
Payer: COMMERCIAL

## 2023-10-17 DIAGNOSIS — G89.4 CHRONIC PAIN SYNDROME: ICD-10-CM

## 2023-10-17 DIAGNOSIS — Z79.891 LONG TERM (CURRENT) USE OF OPIATE ANALGESIC: ICD-10-CM

## 2023-10-18 ENCOUNTER — PATIENT MESSAGE (OUTPATIENT)
Dept: OBSTETRICS AND GYNECOLOGY | Facility: CLINIC | Age: 43
End: 2023-10-18
Payer: COMMERCIAL

## 2023-10-18 RX ORDER — HYDROCODONE BITARTRATE AND ACETAMINOPHEN 5; 325 MG/1; MG/1
1 TABLET ORAL EVERY 4 HOURS PRN
Qty: 180 TABLET | Refills: 0 | Status: SHIPPED | OUTPATIENT
Start: 2023-10-18 | End: 2023-11-16 | Stop reason: SDUPTHER

## 2023-10-19 ENCOUNTER — PATIENT MESSAGE (OUTPATIENT)
Dept: PAIN MEDICINE | Facility: CLINIC | Age: 43
End: 2023-10-19
Payer: COMMERCIAL

## 2023-11-13 DIAGNOSIS — F90.0 ATTENTION DEFICIT HYPERACTIVITY DISORDER (ADHD), PREDOMINANTLY INATTENTIVE TYPE: ICD-10-CM

## 2023-11-13 RX ORDER — LISDEXAMFETAMINE DIMESYLATE CAPSULES 20 MG/1
20 CAPSULE ORAL EVERY MORNING
Qty: 30 CAPSULE | Refills: 0 | Status: SHIPPED | OUTPATIENT
Start: 2023-11-13 | End: 2024-01-24

## 2023-11-16 ENCOUNTER — TELEPHONE (OUTPATIENT)
Dept: RHEUMATOLOGY | Facility: CLINIC | Age: 43
End: 2023-11-16
Payer: COMMERCIAL

## 2023-11-16 DIAGNOSIS — Z79.891 LONG TERM (CURRENT) USE OF OPIATE ANALGESIC: ICD-10-CM

## 2023-11-16 DIAGNOSIS — G89.4 CHRONIC PAIN SYNDROME: ICD-10-CM

## 2023-11-16 NOTE — TELEPHONE ENCOUNTER
Left a vm on 11/15/2023 and 11/16/2023 letting the patient know that we need her to call us back to cancel her apt

## 2023-11-17 RX ORDER — HYDROCODONE BITARTRATE AND ACETAMINOPHEN 5; 325 MG/1; MG/1
1 TABLET ORAL EVERY 4 HOURS PRN
Qty: 180 TABLET | Refills: 0 | Status: SHIPPED | OUTPATIENT
Start: 2023-11-17 | End: 2023-12-13 | Stop reason: SDUPTHER

## 2023-12-04 ENCOUNTER — PATIENT MESSAGE (OUTPATIENT)
Dept: PAIN MEDICINE | Facility: CLINIC | Age: 43
End: 2023-12-04
Payer: COMMERCIAL

## 2023-12-11 DIAGNOSIS — G89.4 CHRONIC PAIN SYNDROME: ICD-10-CM

## 2023-12-11 DIAGNOSIS — M54.2 CHRONIC NECK PAIN: ICD-10-CM

## 2023-12-11 DIAGNOSIS — Z79.891 LONG TERM (CURRENT) USE OF OPIATE ANALGESIC: ICD-10-CM

## 2023-12-11 DIAGNOSIS — G89.29 CHRONIC NECK PAIN: ICD-10-CM

## 2023-12-11 DIAGNOSIS — M54.12 CERVICAL RADICULOPATHY: ICD-10-CM

## 2023-12-11 DIAGNOSIS — M54.81 CERVICO-OCCIPITAL NEURALGIA: ICD-10-CM

## 2023-12-13 ENCOUNTER — CLINICAL SUPPORT (OUTPATIENT)
Dept: OBSTETRICS AND GYNECOLOGY | Facility: CLINIC | Age: 43
End: 2023-12-13
Payer: COMMERCIAL

## 2023-12-13 ENCOUNTER — OFFICE VISIT (OUTPATIENT)
Dept: PAIN MEDICINE | Facility: CLINIC | Age: 43
End: 2023-12-13
Payer: COMMERCIAL

## 2023-12-13 VITALS
SYSTOLIC BLOOD PRESSURE: 127 MMHG | WEIGHT: 128 LBS | OXYGEN SATURATION: 96 % | BODY MASS INDEX: 21.85 KG/M2 | HEART RATE: 84 BPM | DIASTOLIC BLOOD PRESSURE: 70 MMHG | HEIGHT: 64 IN

## 2023-12-13 DIAGNOSIS — M54.12 CERVICAL RADICULOPATHY: ICD-10-CM

## 2023-12-13 DIAGNOSIS — M54.2 CHRONIC NECK PAIN: ICD-10-CM

## 2023-12-13 DIAGNOSIS — Z01.89 ROUTINE LAB DRAW: Primary | ICD-10-CM

## 2023-12-13 DIAGNOSIS — M54.2 CERVICALGIA: ICD-10-CM

## 2023-12-13 DIAGNOSIS — G89.4 CHRONIC PAIN SYNDROME: Primary | ICD-10-CM

## 2023-12-13 DIAGNOSIS — R51.9 CHRONIC NONINTRACTABLE HEADACHE, UNSPECIFIED HEADACHE TYPE: ICD-10-CM

## 2023-12-13 DIAGNOSIS — G89.29 CHRONIC NONINTRACTABLE HEADACHE, UNSPECIFIED HEADACHE TYPE: ICD-10-CM

## 2023-12-13 DIAGNOSIS — Z79.891 LONG TERM (CURRENT) USE OF OPIATE ANALGESIC: ICD-10-CM

## 2023-12-13 DIAGNOSIS — M54.12 CERVICAL RADICULOPATHY: Primary | ICD-10-CM

## 2023-12-13 DIAGNOSIS — G89.29 CHRONIC NECK PAIN: ICD-10-CM

## 2023-12-13 DIAGNOSIS — M54.81 OCCIPITAL NEURALGIA OF LEFT SIDE: ICD-10-CM

## 2023-12-13 DIAGNOSIS — M54.81 CERVICO-OCCIPITAL NEURALGIA: ICD-10-CM

## 2023-12-13 DIAGNOSIS — M79.18 MYOFASCIAL PAIN: ICD-10-CM

## 2023-12-13 LAB
CRP QUANTITATIVE: < 0.29 MG/DL (ref 0–0.3)
ERYTHROCYTE [SEDIMENTATION RATE] IN BLOOD: 11 MM/HR (ref 0–20)

## 2023-12-13 PROCEDURE — 99214 PR OFFICE/OUTPT VISIT, EST, LEVL IV, 30-39 MIN: ICD-10-PCS | Mod: S$GLB,,, | Performed by: PHYSICAL MEDICINE & REHABILITATION

## 2023-12-13 PROCEDURE — 1159F PR MEDICATION LIST DOCUMENTED IN MEDICAL RECORD: ICD-10-PCS | Mod: CPTII,S$GLB,, | Performed by: PHYSICAL MEDICINE & REHABILITATION

## 2023-12-13 PROCEDURE — 36415 COLL VENOUS BLD VENIPUNCTURE: CPT | Mod: S$GLB,,, | Performed by: PHYSICAL MEDICINE & REHABILITATION

## 2023-12-13 PROCEDURE — 1160F PR REVIEW ALL MEDS BY PRESCRIBER/CLIN PHARMACIST DOCUMENTED: ICD-10-PCS | Mod: CPTII,S$GLB,, | Performed by: PHYSICAL MEDICINE & REHABILITATION

## 2023-12-13 PROCEDURE — 36415 PR COLLECTION VENOUS BLOOD,VENIPUNCTURE: ICD-10-PCS | Mod: S$GLB,,, | Performed by: PHYSICAL MEDICINE & REHABILITATION

## 2023-12-13 PROCEDURE — 3078F PR MOST RECENT DIASTOLIC BLOOD PRESSURE < 80 MM HG: ICD-10-PCS | Mod: CPTII,S$GLB,, | Performed by: PHYSICAL MEDICINE & REHABILITATION

## 2023-12-13 PROCEDURE — 3074F SYST BP LT 130 MM HG: CPT | Mod: CPTII,S$GLB,, | Performed by: PHYSICAL MEDICINE & REHABILITATION

## 2023-12-13 PROCEDURE — 3008F PR BODY MASS INDEX (BMI) DOCUMENTED: ICD-10-PCS | Mod: CPTII,S$GLB,, | Performed by: PHYSICAL MEDICINE & REHABILITATION

## 2023-12-13 PROCEDURE — 3074F PR MOST RECENT SYSTOLIC BLOOD PRESSURE < 130 MM HG: ICD-10-PCS | Mod: CPTII,S$GLB,, | Performed by: PHYSICAL MEDICINE & REHABILITATION

## 2023-12-13 PROCEDURE — 99214 OFFICE O/P EST MOD 30 MIN: CPT | Mod: S$GLB,,, | Performed by: PHYSICAL MEDICINE & REHABILITATION

## 2023-12-13 PROCEDURE — 1160F RVW MEDS BY RX/DR IN RCRD: CPT | Mod: CPTII,S$GLB,, | Performed by: PHYSICAL MEDICINE & REHABILITATION

## 2023-12-13 PROCEDURE — 3078F DIAST BP <80 MM HG: CPT | Mod: CPTII,S$GLB,, | Performed by: PHYSICAL MEDICINE & REHABILITATION

## 2023-12-13 PROCEDURE — 1159F MED LIST DOCD IN RCRD: CPT | Mod: CPTII,S$GLB,, | Performed by: PHYSICAL MEDICINE & REHABILITATION

## 2023-12-13 PROCEDURE — 3008F BODY MASS INDEX DOCD: CPT | Mod: CPTII,S$GLB,, | Performed by: PHYSICAL MEDICINE & REHABILITATION

## 2023-12-13 RX ORDER — TIZANIDINE 4 MG/1
4-8 TABLET ORAL NIGHTLY PRN
Qty: 60 TABLET | Refills: 5 | Status: SHIPPED | OUTPATIENT
Start: 2023-12-13

## 2023-12-13 RX ORDER — HYDROCODONE BITARTRATE AND ACETAMINOPHEN 5; 325 MG/1; MG/1
1 TABLET ORAL EVERY 4 HOURS PRN
Qty: 180 TABLET | Refills: 0 | Status: SHIPPED | OUTPATIENT
Start: 2023-12-15 | End: 2024-01-16 | Stop reason: SDUPTHER

## 2023-12-13 RX ORDER — TIZANIDINE 2 MG/1
4-8 TABLET ORAL NIGHTLY PRN
Qty: 360 TABLET | Refills: 1 | OUTPATIENT
Start: 2023-12-13

## 2023-12-13 RX ORDER — GABAPENTIN 300 MG/1
300 CAPSULE ORAL NIGHTLY
Qty: 30 CAPSULE | Refills: 11 | Status: SHIPPED | OUTPATIENT
Start: 2023-12-13 | End: 2024-12-07

## 2023-12-13 NOTE — PROGRESS NOTES
[facility]      Chief Complaint:   Chief Complaint   Patient presents with    Neck Pain       History of Present Illness: Margareth Echeverria is a 43 y.o. female referred by Dr. Nelda Thornton for Pelvic pain.      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 and she has a history of chronic pelvic pain and LBP with prior hysterectomy and b/l oophorectomy and she was feeling pretty good in regards to her chronic pelvic pain until Mother's day where she developed rather sudden increase in her pelvic pain with no clear inciting event  Location: involves her entire pelvis, but seems to be primarily localized over her sacrum area  Radiation: She feels some radiation into her anterior thighs  Timing: constant  Quality: Aching, Throbbing, Pounding, Deep, Stabbing, and nagging and swelling  Exacerbating Factors: Bowel movements, valsalva, intercourse, sitting, standing prolonged  Alleviating Factors: nothing  Associated Symptoms: She has neuropathy in her legs/feet from prior to this incident. She feels weakness in her legs. She has chronic constipation, but has been having daily BMs. She does note recent weight loss (states about 30 lbs). She does note night sweats. She denies fevers, urinary incontinence/change in function and bowel incontinence/change in function    Nothing seems to be helping, she has tried heating, cold. She saw gynecology and was sent here and to pelvic floor rehab.     She denies aggravation with urination.     Severity: Currently: 8/10   Typical Range: 5-10/10     Exacerbation: 10/10     P = 8  E = 10  G = 10  Baseline PEG Score = 9.33    Interval History (06/29/2023):  Margareth Echeverria returns today for follow up.  At the last clinic visit, scheduled SIJ.    SIJ provided > 50% relief. However, the area of the needle insertion was slightly erythematous. She went swimming the day after the injection and suspects that may have contributed. She  rubbed her eczema cream on it and it seemed to help, but still slightly red. She denies fevers, chills, drainage.     Currently, the back pain is improved.  She has had some insomnia since the steroid injection. She does note some radiating pains into the legs that     Etodolac was sent in since last visit, and she finds that helps as well.     She is mostly noting bilateral feet pain today. The pain is primarily in the metatarsal area, but can radiate up her ankles at times. Pain seems pretty constant, but worse with walking. She states she has been diagnosed with neuropathy in the past, but this pain seems different.     Current Pain Scales:  Current: 5/10              Typical Range: 3-7/10      Interval History (08/16/2023):  Margareth Echeverria returns today for follow up.  At the last clinic visit, encouraged her to start PT which has not been done due to back pain improved and dealing with stress from her current pain medicine physician.     Currently, the lower back pain is improved.     She is mostly having HA pain over the left side of her scalp. Pain radiates from the occiput to her parietal region and is constant and has been present for days. Stress seems to be aggravating it. She states she has occipital neuralgia and typically gets injections of her occipital nerves which helps her HA. Pain has been worsening because she has been under a lot of stress. Her current physician managing her pain medicine, Dr. Anaya, has discharged her from clinic for no reason due to her having a procedure (SIJ CSI) with this clinic which helped her. She did nothing to breach her pain contract, but she has been having more difficulty with sleeping and worsening pain with all the stress he has been putting her through. She is prescribed hydrocodone/APAP 10/325 mg QID PRN and states she was not needing to take it as often until he discharged her out of the blue, just because she had her procedure done with this clinic instead  of with the interventional pain physicians at the clinic that Dr. Anaya works.     She also notes continued bilateral feet pain, but now pain worse over bilateral heels and radiates anteriorly and worst 1st in AM.       Interval History (10/13/2023):  Neck pain: She is mostly having the pain in the left cervico-occipital region and this radiates into the left trapezius and down the left arm with numbness and tingling into the left hand. She denies any clear changes in strength recently. She denies changes in b/b function.       Interval History (12/13/2023):  Margareth Echeverria returns today for follow up.  At the last clinic visit, Performed left DANY and SHANELLE blocks in clinic and bilateral cervical TPIs and tapered her medications    Performed left DANY and SHANELLE blocks today i and bilateral cervical TPIs  provided 60% relief. She did some HEP but did not do PT bc she could not tolerate it.     Currently, the neck pain is worse.  Pain still radiating down the left arm to the hand. She is also having more left temporal HAs.    She tapered down her medications, but still has to double up on her pills at times, but does not think this has caused her to run out early. She denies any specific withdrawal effects today, but does have this worsening HA.     Current Pain Scales:  Current: 9/10              Typical Range: 7-10/10     Current PEG Score: 9.33    Opioid Risk Score         Value Time User    Opioid Risk Score  7 6/8/2023 10:19 AM Katharine Steve MD           Previous Interventions:  - 8/16/23: left DANY and SHANELLE block in clinic with US guidance w/ 4 mg/mL dexamethasone and lido 1% which provided nearly complete relief.   - 6/23/23: B/L SIJ CSI w/ > 50% relief in pain and improved fxn.   - Procedures for neck and back pain, but not for pelvic pain    Previous Therapies:  PT/OT: yes for back pain but not pelvic floor  Relevant Surgery: yes    - surgery for trigeminal neuralgia  Previous Medications:   - NSAIDS:   -  Muscle Relaxants: Zanaflex 2mg   - TCAs:   - SNRIs: cymbalta  - Topicals: CBD  - Anticonvulsants:  gabapentin made her feel drunk  - Opioids: hydrocodone    Current Pain Medications:  Cymbalta 60mg  Norco 5/325mg   Tizanidine 2 mg   Etodolac    Blood Thinners: No    Full Medication List:    Current Outpatient Medications:     desonide (DESOWEN) 0.05 % cream, Apply 15 g topically once daily., Disp: , Rfl:     DULoxetine (CYMBALTA) 60 MG capsule, Take 1 capsule (60 mg total) by mouth once daily. (Patient taking differently: Take 30 mg by mouth once daily.), Disp: 30 capsule, Rfl: 11    lisdexamfetamine (VYVANSE) 20 MG capsule, Take 1 capsule (20 mg total) by mouth every morning., Disp: 30 capsule, Rfl: 0    ondansetron (ZOFRAN) 4 MG tablet, TAKE 1 TABLET BY MOUTH EVERY 6 HOURS FOR 10 DAYS, Disp: , Rfl:     ondansetron (ZOFRAN) 4 MG tablet, Take 1 tablet (4 mg total) by mouth every 8 (eight) hours as needed for Nausea., Disp: 30 tablet, Rfl: 0    progesterone (PROMETRIUM) 100 MG capsule, , Disp: , Rfl:     progesterone (PROMETRIUM) 200 MG capsule, , Disp: , Rfl:     promethazine (PHENERGAN) 25 MG tablet, , Disp: , Rfl:     COMPOUND HORMONE REPLACEMENT, Take by mouth once daily. Inject 1 pellet Q 3-4 months to hip, Disp: , Rfl:     etodolac (LODINE) 300 MG Cap, TAKE 1 CAPSULE (300 MG TOTAL) BY MOUTH 2 (TWO) TIMES DAILY AS NEEDED (PAIN AND INFLAMMATION)., Disp: 60 capsule, Rfl: 0    gabapentin (NEURONTIN) 300 MG capsule, Take 1 capsule (300 mg total) by mouth every evening., Disp: 30 capsule, Rfl: 11    [START ON 12/15/2023] HYDROcodone-acetaminophen (NORCO) 5-325 mg per tablet, Take 1 tablet by mouth every 4 (four) hours as needed for Pain., Disp: 180 tablet, Rfl: 0    levocetirizine (XYZAL) 5 MG tablet, , Disp: , Rfl:     SYMPROIC 0.2 mg Tab, , Disp: , Rfl:     tiZANidine (ZANAFLEX) 4 MG tablet, Take 1-2 tablets (4-8 mg total) by mouth nightly as needed (pain and sleep)., Disp: 60 tablet, Rfl: 5     Review of  "Systems:  ROS    Allergies:  Opioids - morphine analogues, Morphine, and Tramadol     Medical History:   has a past medical history of ADHD (attention deficit hyperactivity disorder), Adult hypophosphatasia, AV block, Fibromyalgia, IBS (irritable bowel syndrome), IC (interstitial cystitis), Occipital neuralgia, and Trigeminal neuralgia.    Surgical History:   has a past surgical history that includes Inguinal hernia repair (Bilateral); Laparoscopic cholecystectomy; Augmentation of breast; Fulguration of endometriosis (07/05/2016); Laparoscopy-assisted vaginal hysterectomy (11/11/2019); Laparoscopy (12/08/2019); Appendectomy; Cystoscopy with hydrodistension and biopsy of bladder (12/28/2020); Removal of breast implant (04/2021); and Hysterectomy.    Family History:  family history includes Breast cancer in her maternal cousin; Lung cancer in her paternal grandmother; No Known Problems in her father and mother.    Social History:   reports that she quit smoking about 12 years ago. Her smoking use included cigarettes. She started smoking about 22 years ago. She has a 5.0 pack-year smoking history. She has never been exposed to tobacco smoke. She has never used smokeless tobacco. She reports current alcohol use. She reports current drug use. Drug: Marijuana.    Physical Exam:  /70   Pulse 84   Ht 5' 4" (1.626 m)   Wt 58.1 kg (128 lb)   SpO2 96%   BMI 21.97 kg/m²   GEN: No acute distress. Calm, comfortable  HENT: Normocephalic, atraumatic, moist mucous membranes  EYE: Anicteric sclera, non-injected.   CV: Non-diaphoretic. Regular Rate. Radial Pulses 2+.  RESP: Breathing comfortably. Chest expansion symmetric.  EXT: No clubbing, cyanosis.   SKIN: Warm, & dry to palpation. No visible rashes or lesions of exposed skin.   PSYCH: Pleasant mood and appropriate affect. Recent and remote memory intact.   GAIT: Independent, normal ambulation  Neck Exam:       Inspection: No erythema, bruising.       Palpation: (+) TTP " of left cervical paraspinals and over SHANELLE and DANY      ROM: No significant Limitation in flexion, extension, lateral bending or rotation      Provocative Maneuvers:  (-) Spurling's bilaterally  Lumbar Spine Exam:       Inspection: No bruising, swelling. Slight erythema noted at bilateral injection sites.        Palpation: No calor to the area. (+) TTP of b/l SIJ > lumbar paraspinals b/l. (+) TTP of sacrum and coccyx         ROM:  Limited in flexion, extension, lateral bending.       (+) Facet loading bilaterally      (-) Straight Leg Raise bilaterally      (+) ISABELLA bilaterally      (+) SIJ Compression Test bilaterally      (+) Gaenslan's Test bilaterally      (+) Thigh thrust/Posterior Pelvic Pain Provocation Test bilaterally      (+) Sacral thrust  Hip/Pelvis Exam:      Inspection: No gross deformity or apparent leg length discrepancy      Palpation: (+) TTP along inguinal ligaments b/l, pubic symphysis. (+) TTP right ischial bursa and b/l piriformis and gluteal muscles, (-) TTP to bilateral greater trochanteric bursas.       ROM:  No limitation or pain in internal rotation, external rotation b/l  Neurologic Exam:     Alert. Speech is fluent and appropriate.     Strength: 4/5 in left AbDM, wrist extension, 4/5 diffusely in the left leg, otherwise 5/5 throughout bilateral upper and lower extremities     Sensation:  Grossly intact to light touch in bilateral upper and lower extremities     Reflexes: 2+ in b/l BR, biceps, triceps, patella, achilles     Tone: No abnormality appreciated in bilateral lower extremities     No Clonus     Downgoing toes on plantar stimulation     (-) Franco bilaterally                Imaging:  - MRI C-spine 11/21/23:      - X-Ray Cervical Spine AP & Lateral 10/13/23     FINDINGS:  There is some reversal of the normal cervical lordosis.  Vertebral body heights are within normal limits.  No definite spondylolisthesis demonstrated.  Mild-to-moderate disc height loss noted at C5-6.  Mild disc  height loss at C4-5. Posterior elements appear intact without acute fractures or subluxations demonstrated.  Odontoid process appears intact.  Atlantoaxial articulations appear normal.  Prevertebral soft tissues are within normal limits.  Postoperative changes are noted involving the skull on the left in the suboccipital region region.     Impression:     Degenerative findings as above    - MRI L-spine 6/13/23:  Vertebral body alignment is preserved. Vertebral body heights are maintained. Bone marrow signal is within normal limits. The conus medullaris terminates normally at the level of L2. Lumbar nerve roots have normal appearance. Preserved intervertebral disc space height and signal.    T12-L1: No significant canal or foraminal stenosis.    L1-L2: No significant canal or foraminal stenosis.    L2-L3: No significant canal or foraminal stenosis.    L3-L4: No significant canal or foraminal stenosis.    L4-L5: Broad-based disc bulge, ligamentum flavum thickening, facet hypertrophy with at most mild canal stenosis. Mild effacement of the lateral recesses. No significant foraminal stenosis.    L5-S1: No significant canal or foraminal stenosis.        - MRI Pelvis w.o 6/13/23:  The femoral heads are well formed and seated within well formed acetabula. Bone marrow signal is normal. The sacroiliac joints are intact. There is no muscle atrophy or edema. There is no intrapelvic visceral abnormalities. Prior hysterectomy. There is no evidence of fluid collection. There is mild right hamstring origin tendinosis. The gluteus medius and minimus tendon insertions are intact. The hip adductors are intact. There is no evidence of iliopsoas or trochanteric bursitis.    Impression:    Mild right hamstring origin tendinosis. Otherwise, negative pelvic MRI.      - X-ray lumbar spine 6/8/23:  Transitional anatomy with sacralization of L5.  Vertebral body heights maintained.  No spondylolisthesis.  Disc spaces maintained.  Mild facet  arthropathy.  No acute osseous abnormality.  Bilateral SI joint degenerative changes.  Constipation suspected.    - X-ray pelvis 6/8/23:  Hip joint spaces maintained.  Likely transitional anatomy with L5 sacralization.  Symmetric degenerative findings of the bilateral SI joints.  Constipation findings noted.    - Pelvis CT w/o contrast 1/6/23:  The visualized gastrointestinal tract appears normal.  Urinary bladder is normal.  Inguinal regions are normal.  No masses, fluid collections or adenopathy seen.  1. There is a cystic area on the left side of the pelvis measuring about 5.8 cm. This has a simple cystic appearance to it. Statistically this most likely represents a ovarian cyst. The patient's uterus has apparently beenremoved.  The bony elements appear intact.  Impression:  1. Cyst on the left side of the pelvis. Statistically most likely represents an ovarian cyst. Evaluation with pelvic ultrasound is recommended.    - Pelvic US 12/21/22:  Transabdominal and transvaginal pelvic ultrasound was performed by the sonographer. Static images are submitted. Uterus is not visualized consistent with patient's history of previous partial hysterectomy. Right  ovary is not visualized. Patient provides a history of previous right oophorectomy. Left ovary measures 3.4 x 3.9 x 2.9 cm in size. Multiple left ovarian follicles are visualized. Small left ovarian cyst is visualized measuring 1.8 cm in diameter. Left ovarian blood flow is present. Trace amount of pelvic free fluid is present adjacent to the vaginal cuff.  IMPRESSION:  1. Nonvisualization of the uterus and right ovary consistent with patient's surgical history.  2. Small 1.8 cm left ovarian cyst.  3. Trace amount of pelvic free fluid.    - MRI C-spine 9/26/22:  FINDINGS:  Reversal of the normal cervical lordotic curvature. The vertebral body heights and alignment are maintained throughout the cervical spine. No abnormal vertebral body marrow signal. Multilevel  intervertebral disc desiccation.  Spinal cord is normal in caliber and demonstrates normal signal. The visualized portions of the cervicomedullary junction are unremarkable.  C2-C3: Normal intervertebral disc contour. No central canal or neural foraminal narrowing.  C3-C4: Intervertebral disc bulge narrows the ventral subarachnoid space. No central canal or neural foraminal narrowing.  C4-C5: Intervertebral disc bulge eccentric to the right side. There is narrowing of the ventral subarachnoid space. The central canal is borderline measuring 10 mm. Moderate right and mild left neural foraminal narrowing.   C5-C6: Intervertebral disc bulge with superimposed left paracentral disc protrusion. There is narrowing of the ventral subarachnoid space and left subarticular recess. Bilateral uncovertebral and facet hypertrophy. Moderate bilateral neural foraminal narrowing. The central canal is narrowed to 8 mm.  C6-C7: Intervertebral disc bulge narrows the ventral subarachnoid space. The central canal is narrowed to 9 mm. No neural foraminal narrowing.  C7-T1: Normal intervertebral disc contour. No central canal or neural foraminal narrowing.  The paravertebral soft tissues are unremarkable.  IMPRESSION:  Multilevel cervical spondylosis most prominent at C4-7. The findings are not significantly changed when compared to the prior exam.    - MRI Lumbar spine 8/17/21:  FINDINGS:  Alignment: Normal.  Vertebral bodies: Normal height and marrow signal.  T12-L1: No significant spinal canal stenosis or neuroforaminal narrowing.  L1-L2: No significant spinal canal stenosis or neuroforaminal narrowing.  L2-L3: No significant spinal canal stenosis or neuroforaminal narrowing.  L3-L4: No significant spinal canal stenosis or neuroforaminal narrowing.  L4-L5: No significant spinal canal stenosis or neuroforaminal narrowing.  L5-S1: No significant spinal canal stenosis or neuroforaminal narrowing.  Spinal cord: The cord extends down to the  L1 level. The cord has a normal size, configuration and signal pattern.    - MRI Thoracic spine w/o 6/15/20:  FINDINGS:  Vertebral bodies: Normal vertebral body height, alignment and marrow signal.  C7-T1: The disc is normal. The central canal and neural foramen appear normal. No displacement or compression of the neural elements are seen.  T1-T2: The disc has a normal contour. The central canal and neural foramen appear normal. No displacement or compression of the neural elements are seen.  T2-T3: The disc has a normal contour. The central canal and neural foramen appear normal. No displacement or compression of the neural elements are seen.  T3-T4: The disc has a normal contour. The central canal and neural foramen appear normal. No displacement or compression of the neural elements are seen.  T4-T5: The disc has a normal contour. The central canal and neural foramen appear normal. No displacement or compression of the neural elements are seen.  T5-T6: The disc has a normal contour. The central canal and neural foramen appear normal. No displacement or compression of the neural elements are seen.  T6-T7: The disc has a normal contour. The central canal and neural foramen appear normal. No displacement or compression of the neural elements are seen.  T7-T8: The disc has a normal contour. The central canal and neural foramen appear normal. No displacement or compression of the neural elements are seen.  T8-T9: The disc has a normal contour. The central canal and neural foramen appear normal. No displacement or compression of the neural elements are seen.  T9-T10: The disc has a normal contour. The central canal and neural foramen appear normal. No displacement or compression of the neural elements are seen.  T10-T11: The disc has a normal contour. The central canal and neural foramen appear normal. No displacement or compression of the neural elements are seen.  T11-T12: The disc has a normal contour. The central  canal and neural foramen appear normal. No displacement or compression of the neural elements are seen.  T12-L1: The disc has a normal contour. The central canal and neural foramen appear normal. No displacement or compression of the neural elements are seen.  Spinal cord: The cord has a normal size, configuration and signal pattern.  Additional findings: None seen.      Labs:  BMP  Lab Results   Component Value Date     08/16/2023    K 5.4 (H) 08/16/2023     08/16/2023    CO2 29 08/16/2023    BUN 22.2 (H) 08/16/2023    CREATININE 0.74 08/16/2023    CALCIUM 10.0 08/16/2023    ANIONGAP 9.0 08/16/2023     Lab Results   Component Value Date    AST 16 08/16/2023     Lab Results   Component Value Date     08/16/2023       Assessment:  Margareth Echeverria is a 43 y.o. female with the following diagnoses based on history, exam, and imaging:    Problem List Items Addressed This Visit    None  Visit Diagnoses       Chronic pain syndrome    -  Primary    Relevant Medications    tiZANidine (ZANAFLEX) 4 MG tablet    HYDROcodone-acetaminophen (NORCO) 5-325 mg per tablet (Start on 12/15/2023)    gabapentin (NEURONTIN) 300 MG capsule    Other Relevant Orders    Sedimentation rate    C-Reactive Protein    Pain Clinic Drug Screen    Long term (current) use of opiate analgesic        Relevant Medications    tiZANidine (ZANAFLEX) 4 MG tablet    HYDROcodone-acetaminophen (NORCO) 5-325 mg per tablet (Start on 12/15/2023)    gabapentin (NEURONTIN) 300 MG capsule    Other Relevant Orders    Sedimentation rate    C-Reactive Protein    Pain Clinic Drug Screen    Cervico-occipital neuralgia        Relevant Medications    tiZANidine (ZANAFLEX) 4 MG tablet    gabapentin (NEURONTIN) 300 MG capsule    Other Relevant Orders    Ambulatory referral/consult to Physical/Occupational Therapy    Sedimentation rate    C-Reactive Protein    Pain Clinic Drug Screen    Cervical radiculopathy        Relevant Medications    tiZANidine  (ZANAFLEX) 4 MG tablet    gabapentin (NEURONTIN) 300 MG capsule    Other Relevant Orders    Ambulatory referral/consult to Physical/Occupational Therapy    Sedimentation rate    C-Reactive Protein    Pain Clinic Drug Screen    Chronic neck pain        Relevant Medications    tiZANidine (ZANAFLEX) 4 MG tablet    gabapentin (NEURONTIN) 300 MG capsule    Other Relevant Orders    Ambulatory referral/consult to Physical/Occupational Therapy    Sedimentation rate    C-Reactive Protein    Pain Clinic Drug Screen    Cervicalgia        Relevant Medications    gabapentin (NEURONTIN) 300 MG capsule    Other Relevant Orders    Sedimentation rate    C-Reactive Protein    Pain Clinic Drug Screen    Myofascial pain        Relevant Medications    gabapentin (NEURONTIN) 300 MG capsule    Other Relevant Orders    Sedimentation rate    C-Reactive Protein    Pain Clinic Drug Screen    Occipital neuralgia of left side        Relevant Medications    gabapentin (NEURONTIN) 300 MG capsule    Other Relevant Orders    Sedimentation rate    C-Reactive Protein    Pain Clinic Drug Screen    Chronic nonintractable headache, unspecified headache type        Relevant Medications    gabapentin (NEURONTIN) 300 MG capsule    Other Relevant Orders    Ambulatory referral/consult to Neurology    Sedimentation rate    C-Reactive Protein    Pain Clinic Drug Screen                  This is a pleasant 43 y.o. lady presenting with:     - Neck pain and cervicogenic HA and left radicular arm pain. Appears to have occipital neuralgia on left which improved after occipital nerve blocks. She also has some myofascial pain on exam.   - Chronic bilateral low back pain: Pain appears primarily due to SIJ dysfunction on exam, but may have facet and myofascial contributions as well.    - Back pain improved after bilateral SIJ CSI   - She does note some radiation into the legs in L4/5 distribution and MRI with mild canal stenosis at L4-5 with facet arthropathy  - Chronic  pelvic pain: Pain appears multifactorial and she has a complex history with prior hysterectomy for endometriosis, interstitial cystitis, b/l oophorectomy for ovarian cyst, prior bilateral inguinal hernia repair and hypophosphatasia. Unclear exactly what the cause of the pain is at this point as she has multiple areas of pain.    - SIJ CSI did help some of the anterior pelvis pain as well  - History of fibromyalgia.   - History of familial hypophosphatasia and h/o polyarthralgia that was   - Comorbidities: Depression. ADHD. Hypophosphatasia. H/o Trigeminal neuralgia. IBS w/ constipation. H/o AV block. Former smoker. Allergy to opioids.     Treatment Plan:   - PT/OT/HEP: Re-referred to PT for neck pain. Encouraged her to start PT for her SIJ pain and back pain in addition to her pelvic floor therapy.    - Discussed benefits of exercise for pain.   - Procedures: Schedule C7-T1 IL ALEKSANDR with local/MAC and hopefully this will calm pain down enough for her to participate in PT.    - If limited relief with ALEKSANDR, consider left C2-3, C3-4 diagnostic MBBs.   - Consider repeat b/l SIJ CSI if back pain returns/worsens  - Consider L4-5 IL ALEKSANDR for radicular leg pains   - Consider b/l superior hypogastric plexus block for chronic pelvic pain   - Consider ganglion impar and sacrococcygeal injection for coccydynia  - Medications:   - Cont etodolac PRN   - Our plan will be to taper the dose of her opioids by 10-20% per month as possible, though this rate will increase necessarily as overall dose decreases.    - Cont Norco 5/325 mg q 4 hrs PRN pain. This is 30 MME. No decrease this month.   - Cont tizanidine 4-8 mg qHS.    - Cont cymbalta 30-60 mg    - Add gabapentin 300 mg qHS for HA ppx and chronic pain.    - Pain contract signed 08/16/2023    -  reviewed  - Imaging: Reviewed.   - Labs: Reviewed.  Medications are appropriately dosed for current hepatorenal function.  - Consulted rheumatology regarding hypophosphatasia,  pending    Follow Up: RTC 2-3 weeks after ALEKSANDR or sooner RUDDY Steve M.D.  Interventional Pain Medicine / Physical Medicine & Rehabilitation

## 2023-12-14 ENCOUNTER — PATIENT MESSAGE (OUTPATIENT)
Dept: PAIN MEDICINE | Facility: CLINIC | Age: 43
End: 2023-12-14
Payer: COMMERCIAL

## 2023-12-15 ENCOUNTER — PATIENT MESSAGE (OUTPATIENT)
Dept: PAIN MEDICINE | Facility: CLINIC | Age: 43
End: 2023-12-15
Payer: COMMERCIAL

## 2023-12-21 ENCOUNTER — PATIENT MESSAGE (OUTPATIENT)
Dept: PAIN MEDICINE | Facility: CLINIC | Age: 43
End: 2023-12-21
Payer: COMMERCIAL

## 2023-12-22 DIAGNOSIS — M54.12 CERVICAL RADICULOPATHY: Primary | ICD-10-CM

## 2023-12-27 ENCOUNTER — PATIENT MESSAGE (OUTPATIENT)
Dept: FAMILY MEDICINE | Facility: CLINIC | Age: 43
End: 2023-12-27
Payer: COMMERCIAL

## 2023-12-30 DIAGNOSIS — G89.29 CHRONIC SI JOINT PAIN: ICD-10-CM

## 2023-12-30 DIAGNOSIS — M53.3 CHRONIC SI JOINT PAIN: ICD-10-CM

## 2024-01-02 RX ORDER — ETODOLAC 300 MG/1
300 CAPSULE ORAL 2 TIMES DAILY PRN
Qty: 60 CAPSULE | Refills: 0 | Status: SHIPPED | OUTPATIENT
Start: 2024-01-02 | End: 2024-02-01

## 2024-01-08 ENCOUNTER — OUTSIDE PLACE OF SERVICE (OUTPATIENT)
Dept: PAIN MEDICINE | Facility: CLINIC | Age: 44
End: 2024-01-08

## 2024-01-08 DIAGNOSIS — R60.0 BILATERAL LOWER EXTREMITY EDEMA: Primary | ICD-10-CM

## 2024-01-08 PROCEDURE — 62321 NJX INTERLAMINAR CRV/THRC: CPT | Mod: ,,, | Performed by: PHYSICAL MEDICINE & REHABILITATION

## 2024-01-16 DIAGNOSIS — Z79.891 LONG TERM (CURRENT) USE OF OPIATE ANALGESIC: ICD-10-CM

## 2024-01-16 DIAGNOSIS — G89.4 CHRONIC PAIN SYNDROME: ICD-10-CM

## 2024-01-17 RX ORDER — HYDROCODONE BITARTRATE AND ACETAMINOPHEN 5; 325 MG/1; MG/1
1 TABLET ORAL EVERY 4 HOURS PRN
Qty: 180 TABLET | Refills: 0 | Status: SHIPPED | OUTPATIENT
Start: 2024-01-17 | End: 2024-02-17 | Stop reason: SDUPTHER

## 2024-01-23 ENCOUNTER — OFFICE VISIT (OUTPATIENT)
Dept: PAIN MEDICINE | Facility: CLINIC | Age: 44
End: 2024-01-23
Payer: COMMERCIAL

## 2024-01-23 VITALS
WEIGHT: 128 LBS | HEART RATE: 88 BPM | HEIGHT: 64 IN | BODY MASS INDEX: 21.85 KG/M2 | DIASTOLIC BLOOD PRESSURE: 78 MMHG | OXYGEN SATURATION: 99 % | SYSTOLIC BLOOD PRESSURE: 122 MMHG

## 2024-01-23 DIAGNOSIS — Z79.891 LONG TERM (CURRENT) USE OF OPIATE ANALGESIC: ICD-10-CM

## 2024-01-23 DIAGNOSIS — M54.2 CHRONIC NECK PAIN: ICD-10-CM

## 2024-01-23 DIAGNOSIS — M53.3 SACROILIAC JOINT DYSFUNCTION: ICD-10-CM

## 2024-01-23 DIAGNOSIS — M79.18 MYOFASCIAL PAIN: ICD-10-CM

## 2024-01-23 DIAGNOSIS — M54.12 CERVICAL RADICULOPATHY: ICD-10-CM

## 2024-01-23 DIAGNOSIS — M25.50 POLYARTHRALGIA: Primary | ICD-10-CM

## 2024-01-23 DIAGNOSIS — E83.39 ADULT HYPOPHOSPHATASIA: ICD-10-CM

## 2024-01-23 DIAGNOSIS — G89.29 CHRONIC NECK PAIN: ICD-10-CM

## 2024-01-23 DIAGNOSIS — G89.4 CHRONIC PAIN SYNDROME: ICD-10-CM

## 2024-01-23 PROCEDURE — 1159F MED LIST DOCD IN RCRD: CPT | Mod: CPTII,S$GLB,, | Performed by: PHYSICAL MEDICINE & REHABILITATION

## 2024-01-23 PROCEDURE — 1160F RVW MEDS BY RX/DR IN RCRD: CPT | Mod: CPTII,S$GLB,, | Performed by: PHYSICAL MEDICINE & REHABILITATION

## 2024-01-23 PROCEDURE — 20553 NJX 1/MLT TRIGGER POINTS 3/>: CPT | Mod: S$GLB,,, | Performed by: PHYSICAL MEDICINE & REHABILITATION

## 2024-01-23 PROCEDURE — 99215 OFFICE O/P EST HI 40 MIN: CPT | Mod: 25,S$GLB,, | Performed by: PHYSICAL MEDICINE & REHABILITATION

## 2024-01-23 PROCEDURE — 3078F DIAST BP <80 MM HG: CPT | Mod: CPTII,S$GLB,, | Performed by: PHYSICAL MEDICINE & REHABILITATION

## 2024-01-23 PROCEDURE — 3008F BODY MASS INDEX DOCD: CPT | Mod: CPTII,S$GLB,, | Performed by: PHYSICAL MEDICINE & REHABILITATION

## 2024-01-23 PROCEDURE — 3074F SYST BP LT 130 MM HG: CPT | Mod: CPTII,S$GLB,, | Performed by: PHYSICAL MEDICINE & REHABILITATION

## 2024-01-23 RX ORDER — LIDOCAINE HYDROCHLORIDE 10 MG/ML
5 INJECTION, SOLUTION EPIDURAL; INFILTRATION; INTRACAUDAL; PERINEURAL
Status: DISCONTINUED | OUTPATIENT
Start: 2024-01-23 | End: 2024-01-23 | Stop reason: HOSPADM

## 2024-01-23 RX ADMIN — LIDOCAINE HYDROCHLORIDE 5 ML: 10 INJECTION, SOLUTION EPIDURAL; INFILTRATION; INTRACAUDAL; PERINEURAL at 01:01

## 2024-01-23 NOTE — PATIENT INSTRUCTIONS
#165 Combat leg swelling naturally: Effective exercises and compression stocking solutions ([email or web address])     Rodney Medina exercises for leg swelling.

## 2024-01-23 NOTE — PROGRESS NOTES
[facility]      Chief Complaint:   Chief Complaint   Patient presents with    Neck Pain       History of Present Illness: Margareth Echeverria is a 44 y.o. female referred by Dr. Nelda Thornton for Pelvic pain.      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 and she has a history of chronic pelvic pain and LBP with prior hysterectomy and b/l oophorectomy and she was feeling pretty good in regards to her chronic pelvic pain until Mother's day where she developed rather sudden increase in her pelvic pain with no clear inciting event  Location: involves her entire pelvis, but seems to be primarily localized over her sacrum area  Radiation: She feels some radiation into her anterior thighs  Timing: constant  Quality: Aching, Throbbing, Pounding, Deep, Stabbing, and nagging and swelling  Exacerbating Factors: Bowel movements, valsalva, intercourse, sitting, standing prolonged  Alleviating Factors: nothing  Associated Symptoms: She has neuropathy in her legs/feet from prior to this incident. She feels weakness in her legs. She has chronic constipation, but has been having daily BMs. She does note recent weight loss (states about 30 lbs). She does note night sweats. She denies fevers, urinary incontinence/change in function and bowel incontinence/change in function    Nothing seems to be helping, she has tried heating, cold. She saw gynecology and was sent here and to pelvic floor rehab.     She denies aggravation with urination.     Severity: Currently: 8/10   Typical Range: 5-10/10     Exacerbation: 10/10     P = 8  E = 10  G = 10  Baseline PEG Score = 9.33    Interval History (06/29/2023):  She is mostly noting bilateral feet pain today. The pain is primarily in the metatarsal area, but can radiate up her ankles at times. Pain seems pretty constant, but worse with walking. She states she has been diagnosed with neuropathy in the past, but this pain seems  different.       Interval History (08/16/2023):  Margareth Echeverria returns today for follow up.  At the last clinic visit, encouraged her to start PT which has not been done due to back pain improved and dealing with stress from her current pain medicine physician.     Currently, the lower back pain is improved.     She is mostly having HA pain over the left side of her scalp. Pain radiates from the occiput to her parietal region and is constant and has been present for days. Stress seems to be aggravating it. She states she has occipital neuralgia and typically gets injections of her occipital nerves which helps her HA. Pain has been worsening because she has been under a lot of stress. Her current physician managing her pain medicine, Dr. Anaya, has discharged her from clinic for no reason due to her having a procedure (SIJ CSI) with this clinic which helped her. She did nothing to breach her pain contract, but she has been having more difficulty with sleeping and worsening pain with all the stress he has been putting her through. She is prescribed hydrocodone/APAP 10/325 mg QID PRN and states she was not needing to take it as often until he discharged her out of the blue, just because she had her procedure done with this clinic instead of with the interventional pain physicians at the clinic that Dr. Anaya works.     She also notes continued bilateral feet pain, but now pain worse over bilateral heels and radiates anteriorly and worst 1st in AM.       Interval History (10/13/2023):  Neck pain: She is mostly having the pain in the left cervico-occipital region and this radiates into the left trapezius and down the left arm with numbness and tingling into the left hand. She denies any clear changes in strength recently. She denies changes in b/b function.       Interval History (01/23/2024):  Margareth Echeverria returns today for follow up.  At the last clinic visit, scheduled C7-T1 IL ALEKSANDR with local/MAC and re-sent  order for PT.     C7-T1 IL ALEKSANDR with local/MAC (1/8/24)  provided 80% relief. Patient is experiencing stiffness and tightness of the neck.     Currently, the Neck pain is improved.  Printing more bilateral low back pain.  This pain is over her bilateral PSIS which is similar to her prior back pain that responded to sacroiliac joint injection.  This pain started in the past few weeks since starting a new job and being more active.  She states she has been doing her home exercise program for the sacroiliac joints, but the pain is persisting.    Current Pain Scales:  Current: 3/10              Typical Range: 0-3/10     Current PEG Score: 2.67      Opioid Risk Score         Value Time User    Opioid Risk Score  7 6/8/2023 10:19 AM Katharine Steve MD           Previous Interventions:  - 8/16/23: left DANY and SHANELLE block in clinic with US guidance w/ 4 mg/mL dexamethasone and lido 1% which provided nearly complete relief.   - 6/23/23: B/L SIJ CSI w/ > 50% relief in pain and improved fxn.   - Procedures for neck and back pain, but not for pelvic pain    Previous Therapies:  PT/OT: yes for back pain but not pelvic floor  Relevant Surgery: yes    - surgery for trigeminal neuralgia  Previous Medications:   - NSAIDS:   - Muscle Relaxants: Zanaflex 2mg   - TCAs:   - SNRIs: cymbalta  - Topicals: CBD  - Anticonvulsants:  gabapentin made her feel drunk  - Opioids: hydrocodone    Current Pain Medications:  Cymbalta 60mg  Norco 5/325mg   Tizanidine 2 mg   Etodolac    Blood Thinners: No    Full Medication List:    Current Outpatient Medications:     COMPOUND HORMONE REPLACEMENT, Take by mouth once daily. Inject 1 pellet Q 3-4 months to hip, Disp: , Rfl:     etodolac (LODINE) 300 MG Cap, TAKE 1 CAPSULE (300 MG TOTAL) BY MOUTH 2 (TWO) TIMES DAILY AS NEEDED (PAIN AND INFLAMMATION)., Disp: 60 capsule, Rfl: 0    gabapentin (NEURONTIN) 300 MG capsule, Take 1 capsule (300 mg total) by mouth every evening., Disp: 30 capsule, Rfl: 11     HYDROcodone-acetaminophen (NORCO) 5-325 mg per tablet, Take 1 tablet by mouth every 4 (four) hours as needed for Pain., Disp: 180 tablet, Rfl: 0    lisdexamfetamine (VYVANSE) 20 MG capsule, Take 1 capsule (20 mg total) by mouth every morning., Disp: 30 capsule, Rfl: 0    ondansetron (ZOFRAN) 4 MG tablet, Take 1 tablet (4 mg total) by mouth every 8 (eight) hours as needed for Nausea., Disp: 30 tablet, Rfl: 0    promethazine (PHENERGAN) 25 MG tablet, , Disp: , Rfl:     tiZANidine (ZANAFLEX) 4 MG tablet, Take 1-2 tablets (4-8 mg total) by mouth nightly as needed (pain and sleep)., Disp: 60 tablet, Rfl: 5     Review of Systems:  ROS    Allergies:  Opioids - morphine analogues, Morphine, and Tramadol     Medical History:   has a past medical history of ADHD (attention deficit hyperactivity disorder), Adult hypophosphatasia, AV block, Fibromyalgia, IBS (irritable bowel syndrome), IC (interstitial cystitis), Occipital neuralgia, and Trigeminal neuralgia.    Surgical History:   has a past surgical history that includes Inguinal hernia repair (Bilateral); Laparoscopic cholecystectomy; Augmentation of breast; Fulguration of endometriosis (07/05/2016); Laparoscopy-assisted vaginal hysterectomy (11/11/2019); Laparoscopy (12/08/2019); Appendectomy; Cystoscopy with hydrodistension and biopsy of bladder (12/28/2020); Removal of breast implant (04/2021); and Hysterectomy.    Family History:  family history includes Breast cancer in her maternal cousin; Lung cancer in her paternal grandmother; No Known Problems in her father and mother.    Social History:   reports that she quit smoking about 12 years ago. Her smoking use included cigarettes. She started smoking about 22 years ago. She has a 5.0 pack-year smoking history. She has never been exposed to tobacco smoke. She has never used smokeless tobacco. She reports current alcohol use. She reports current drug use. Drug: Marijuana.    Physical Exam:  /78   Pulse 88   Ht 5'  "4" (1.626 m)   Wt 58.1 kg (128 lb)   SpO2 99%   BMI 21.97 kg/m²   GEN: No acute distress. Calm, comfortable  HENT: Normocephalic, atraumatic, moist mucous membranes  EYE: Anicteric sclera, non-injected.   CV: Non-diaphoretic. Regular Rate. Radial Pulses 2+.  RESP: Breathing comfortably. Chest expansion symmetric.  EXT: No clubbing, cyanosis.   SKIN: Warm, & dry to palpation. No visible rashes or lesions of exposed skin.   PSYCH: Pleasant mood and appropriate affect. Recent and remote memory intact.   GAIT: Independent, normal ambulation  Neck Exam:       Inspection: No erythema, bruising.       Palpation: (+) TTP of left cervical paraspinals and over SHANELLE and DANY      ROM: No significant Limitation in flexion, extension, lateral bending or rotation      Provocative Maneuvers:  (-) Spurling's bilaterally  Lumbar Spine Exam:       Inspection: No bruising, swelling. Slight erythema noted at bilateral injection sites.        Palpation: No calor to the area. (+) TTP of b/l SIJ > lumbar paraspinals b/l. (+) TTP of sacrum and coccyx         ROM:  Limited in flexion, extension, lateral bending.       (+) Facet loading bilaterally      (-) Straight Leg Raise bilaterally      (+) ISABELLA bilaterally      (+) SIJ Compression Test bilaterally      (+) Gaenslan's Test bilaterally      (+) Thigh thrust/Posterior Pelvic Pain Provocation Test bilaterally      (+) Sacral thrust  Hip/Pelvis Exam:      Inspection: No gross deformity or apparent leg length discrepancy      Palpation: (+) TTP along inguinal ligaments b/l, pubic symphysis. (+) TTP right ischial bursa and b/l piriformis and gluteal muscles, (-) TTP to bilateral greater trochanteric bursas.       ROM:  No limitation or pain in internal rotation, external rotation b/l  Neurologic Exam:     Alert. Speech is fluent and appropriate.     Strength: 4/5 in left AbDM, wrist extension, 4/5 diffusely in the left leg, otherwise 5/5 throughout bilateral upper and lower extremities     " Sensation:  Grossly intact to light touch in bilateral upper and lower extremities     Reflexes: 2+ in b/l BR, biceps, triceps, patella, achilles     Tone: No abnormality appreciated in bilateral lower extremities     No Clonus     Downgoing toes on plantar stimulation     (-) Franco bilaterally                Imaging:  - MRI C-spine 11/21/23:      - X-Ray Cervical Spine AP & Lateral 10/13/23     FINDINGS:  There is some reversal of the normal cervical lordosis.  Vertebral body heights are within normal limits.  No definite spondylolisthesis demonstrated.  Mild-to-moderate disc height loss noted at C5-6.  Mild disc height loss at C4-5. Posterior elements appear intact without acute fractures or subluxations demonstrated.  Odontoid process appears intact.  Atlantoaxial articulations appear normal.  Prevertebral soft tissues are within normal limits.  Postoperative changes are noted involving the skull on the left in the suboccipital region region.     Impression:     Degenerative findings as above    - MRI L-spine 6/13/23:  Vertebral body alignment is preserved. Vertebral body heights are maintained. Bone marrow signal is within normal limits. The conus medullaris terminates normally at the level of L2. Lumbar nerve roots have normal appearance. Preserved intervertebral disc space height and signal.    T12-L1: No significant canal or foraminal stenosis.    L1-L2: No significant canal or foraminal stenosis.    L2-L3: No significant canal or foraminal stenosis.    L3-L4: No significant canal or foraminal stenosis.    L4-L5: Broad-based disc bulge, ligamentum flavum thickening, facet hypertrophy with at most mild canal stenosis. Mild effacement of the lateral recesses. No significant foraminal stenosis.    L5-S1: No significant canal or foraminal stenosis.        - MRI Pelvis w.o 6/13/23:  The femoral heads are well formed and seated within well formed acetabula. Bone marrow signal is normal. The sacroiliac joints are  intact. There is no muscle atrophy or edema. There is no intrapelvic visceral abnormalities. Prior hysterectomy. There is no evidence of fluid collection. There is mild right hamstring origin tendinosis. The gluteus medius and minimus tendon insertions are intact. The hip adductors are intact. There is no evidence of iliopsoas or trochanteric bursitis.    Impression:    Mild right hamstring origin tendinosis. Otherwise, negative pelvic MRI.      - X-ray lumbar spine 6/8/23:  Transitional anatomy with sacralization of L5.  Vertebral body heights maintained.  No spondylolisthesis.  Disc spaces maintained.  Mild facet arthropathy.  No acute osseous abnormality.  Bilateral SI joint degenerative changes.  Constipation suspected.    - X-ray pelvis 6/8/23:  Hip joint spaces maintained.  Likely transitional anatomy with L5 sacralization.  Symmetric degenerative findings of the bilateral SI joints.  Constipation findings noted.    - Pelvis CT w/o contrast 1/6/23:  The visualized gastrointestinal tract appears normal.  Urinary bladder is normal.  Inguinal regions are normal.  No masses, fluid collections or adenopathy seen.  1. There is a cystic area on the left side of the pelvis measuring about 5.8 cm. This has a simple cystic appearance to it. Statistically this most likely represents a ovarian cyst. The patient's uterus has apparently beenremoved.  The bony elements appear intact.  Impression:  1. Cyst on the left side of the pelvis. Statistically most likely represents an ovarian cyst. Evaluation with pelvic ultrasound is recommended.    - Pelvic US 12/21/22:  Transabdominal and transvaginal pelvic ultrasound was performed by the sonographer. Static images are submitted. Uterus is not visualized consistent with patient's history of previous partial hysterectomy. Right  ovary is not visualized. Patient provides a history of previous right oophorectomy. Left ovary measures 3.4 x 3.9 x 2.9 cm in size. Multiple left ovarian  follicles are visualized. Small left ovarian cyst is visualized measuring 1.8 cm in diameter. Left ovarian blood flow is present. Trace amount of pelvic free fluid is present adjacent to the vaginal cuff.  IMPRESSION:  1. Nonvisualization of the uterus and right ovary consistent with patient's surgical history.  2. Small 1.8 cm left ovarian cyst.  3. Trace amount of pelvic free fluid.    - MRI C-spine 9/26/22:  FINDINGS:  Reversal of the normal cervical lordotic curvature. The vertebral body heights and alignment are maintained throughout the cervical spine. No abnormal vertebral body marrow signal. Multilevel intervertebral disc desiccation.  Spinal cord is normal in caliber and demonstrates normal signal. The visualized portions of the cervicomedullary junction are unremarkable.  C2-C3: Normal intervertebral disc contour. No central canal or neural foraminal narrowing.  C3-C4: Intervertebral disc bulge narrows the ventral subarachnoid space. No central canal or neural foraminal narrowing.  C4-C5: Intervertebral disc bulge eccentric to the right side. There is narrowing of the ventral subarachnoid space. The central canal is borderline measuring 10 mm. Moderate right and mild left neural foraminal narrowing.   C5-C6: Intervertebral disc bulge with superimposed left paracentral disc protrusion. There is narrowing of the ventral subarachnoid space and left subarticular recess. Bilateral uncovertebral and facet hypertrophy. Moderate bilateral neural foraminal narrowing. The central canal is narrowed to 8 mm.  C6-C7: Intervertebral disc bulge narrows the ventral subarachnoid space. The central canal is narrowed to 9 mm. No neural foraminal narrowing.  C7-T1: Normal intervertebral disc contour. No central canal or neural foraminal narrowing.  The paravertebral soft tissues are unremarkable.  IMPRESSION:  Multilevel cervical spondylosis most prominent at C4-7. The findings are not significantly changed when compared to  the prior exam.    - MRI Lumbar spine 8/17/21:  FINDINGS:  Alignment: Normal.  Vertebral bodies: Normal height and marrow signal.  T12-L1: No significant spinal canal stenosis or neuroforaminal narrowing.  L1-L2: No significant spinal canal stenosis or neuroforaminal narrowing.  L2-L3: No significant spinal canal stenosis or neuroforaminal narrowing.  L3-L4: No significant spinal canal stenosis or neuroforaminal narrowing.  L4-L5: No significant spinal canal stenosis or neuroforaminal narrowing.  L5-S1: No significant spinal canal stenosis or neuroforaminal narrowing.  Spinal cord: The cord extends down to the L1 level. The cord has a normal size, configuration and signal pattern.    - MRI Thoracic spine w/o 6/15/20:  FINDINGS:  Vertebral bodies: Normal vertebral body height, alignment and marrow signal.  C7-T1: The disc is normal. The central canal and neural foramen appear normal. No displacement or compression of the neural elements are seen.  T1-T2: The disc has a normal contour. The central canal and neural foramen appear normal. No displacement or compression of the neural elements are seen.  T2-T3: The disc has a normal contour. The central canal and neural foramen appear normal. No displacement or compression of the neural elements are seen.  T3-T4: The disc has a normal contour. The central canal and neural foramen appear normal. No displacement or compression of the neural elements are seen.  T4-T5: The disc has a normal contour. The central canal and neural foramen appear normal. No displacement or compression of the neural elements are seen.  T5-T6: The disc has a normal contour. The central canal and neural foramen appear normal. No displacement or compression of the neural elements are seen.  T6-T7: The disc has a normal contour. The central canal and neural foramen appear normal. No displacement or compression of the neural elements are seen.  T7-T8: The disc has a normal contour. The central canal  and neural foramen appear normal. No displacement or compression of the neural elements are seen.  T8-T9: The disc has a normal contour. The central canal and neural foramen appear normal. No displacement or compression of the neural elements are seen.  T9-T10: The disc has a normal contour. The central canal and neural foramen appear normal. No displacement or compression of the neural elements are seen.  T10-T11: The disc has a normal contour. The central canal and neural foramen appear normal. No displacement or compression of the neural elements are seen.  T11-T12: The disc has a normal contour. The central canal and neural foramen appear normal. No displacement or compression of the neural elements are seen.  T12-L1: The disc has a normal contour. The central canal and neural foramen appear normal. No displacement or compression of the neural elements are seen.  Spinal cord: The cord has a normal size, configuration and signal pattern.  Additional findings: None seen.      Labs:  BMP  Lab Results   Component Value Date     08/16/2023    K 5.4 (H) 08/16/2023     08/16/2023    CO2 29 08/16/2023    BUN 22.2 (H) 08/16/2023    CREATININE 0.74 08/16/2023    CALCIUM 10.0 08/16/2023    ANIONGAP 9.0 08/16/2023     Lab Results   Component Value Date    AST 16 08/16/2023     Lab Results   Component Value Date     08/16/2023       Assessment:  Margareth Echeverria is a 44 y.o. female with the following diagnoses based on history, exam, and imaging:    Problem List Items Addressed This Visit    None  Visit Diagnoses       Polyarthralgia    -  Primary    Relevant Orders    Ambulatory referral/consult to Rheumatology    Adult hypophosphatasia        Relevant Orders    Ambulatory referral/consult to Rheumatology    Myofascial pain        Relevant Orders    Trigger Point Injection    Cervical radiculopathy        Chronic pain syndrome        Long term (current) use of opiate analgesic        Chronic neck pain         Sacroiliac joint dysfunction                        This is a pleasant 44 y.o. lady presenting with:     - Neck pain and cervicogenic HA and left radicular arm pain. Appears to have occipital neuralgia on left which improved after occipital nerve blocks. She also has some myofascial pain on exam.   - Chronic bilateral low back pain: Pain appears primarily due to SIJ dysfunction on exam, but may have facet and myofascial contributions as well.    - Back pain improved after bilateral SIJ CSI   - She does note some radiation into the legs in L4/5 distribution and MRI with mild canal stenosis at L4-5 with facet arthropathy  - Chronic pelvic pain: Pain appears multifactorial and she has a complex history with prior hysterectomy for endometriosis, interstitial cystitis, b/l oophorectomy for ovarian cyst, prior bilateral inguinal hernia repair and hypophosphatasia. Unclear exactly what the cause of the pain is at this point as she has multiple areas of pain.    - SIJ CSI did help some of the anterior pelvis pain as well  - History of fibromyalgia.   - History of familial hypophosphatasia and h/o polyarthralgia that was   - Comorbidities: Depression. ADHD. Hypophosphatasia. H/o Trigeminal neuralgia. IBS w/ constipation. H/o AV block. Former smoker. Allergy to opioids.     Treatment Plan:   - PT/OT/HEP: Encouraged cont HEP for her SIJ pain and back pain.    - Discussed benefits of exercise for pain.   - Procedures: Performed bilateral lumbosacral TPIs and left gluteal TPI with lido/bupi (no steroid)  - Can repeat C7-T1 IL ALEKSANDR with local/MAC PRN. She responded very well to this.    - If limited relief with ALEKSANDR, consider left C2-3, C3-4 diagnostic MBBs.   - Consider repeat b/l SIJ CSI if back pain returns/worsens  - Consider L4-5 IL ALEKSANDR for radicular leg pains   - Consider b/l superior hypogastric plexus block for chronic pelvic pain   - Consider ganglion impar and sacrococcygeal injection for coccydynia  - Medications:   -  Cont etodolac PRN   - Next refill of Norco 5/325 mg we will decrease to q 4-6 hrs PRN pain (5 tab/d or #150 per month). This is 25 MME.    - Cont tizanidine 4-8 mg qHS.    - Cont cymbalta 30-60 mg    - Cont gabapentin 300 mg qHS for HA ppx and chronic pain.    - Pain contract signed 08/16/2023    -  reviewed  - Imaging: Reviewed.   - Labs: Reviewed.  Medications are appropriately dosed for current hepatorenal function.  - Consulted rheumatology regarding hypophosphatasia, pending    Follow Up: RTC 2-3 months or sooner PRN    I spent greater than 40 minutes in total in todays visit including face-to-face time with the patient, and time reviewing records/imaging/labs, and documenting.     Katharine Steve M.D.  Interventional Pain Medicine / Physical Medicine & Rehabilitation

## 2024-01-23 NOTE — PROCEDURES
Trigger Point Injection    Performed by: Katharine Steve MD  Authorized by: Katharine Steve MD    Lumbar Paraspinal:  Bilateral    Consent Done?:  Yes (Written)    Pre-Procedure:   Indications:  Pain  Site marked: the procedure site was marked     Timeout: prior to procedure the correct patient, procedure, and site was verified      Local anesthesia used?: Yes    Anesthesia:  Local infiltration  Local anesthetic:  Lidocaine 1% without epinephrine and bupivacaine 0.25% without epinephrine  Anesthetic total (ml):  5    Medications: 5 mL LIDOcaine (PF) 10 mg/ml (1%) 10 mg/mL (1 %)  Lumbosacral:  Bilateral

## 2024-01-24 ENCOUNTER — OFFICE VISIT (OUTPATIENT)
Dept: FAMILY MEDICINE | Facility: CLINIC | Age: 44
End: 2024-01-24
Payer: COMMERCIAL

## 2024-01-24 ENCOUNTER — PATIENT MESSAGE (OUTPATIENT)
Dept: FAMILY MEDICINE | Facility: CLINIC | Age: 44
End: 2024-01-24

## 2024-01-24 VITALS
BODY MASS INDEX: 20.66 KG/M2 | DIASTOLIC BLOOD PRESSURE: 80 MMHG | SYSTOLIC BLOOD PRESSURE: 118 MMHG | OXYGEN SATURATION: 98 % | RESPIRATION RATE: 16 BRPM | HEART RATE: 83 BPM | HEIGHT: 64 IN | WEIGHT: 121 LBS

## 2024-01-24 DIAGNOSIS — M54.81 CERVICO-OCCIPITAL NEURALGIA: ICD-10-CM

## 2024-01-24 DIAGNOSIS — M77.42 METATARSALGIA OF BOTH FEET: ICD-10-CM

## 2024-01-24 DIAGNOSIS — E28.39 FEMALE HYPOGONADISM: ICD-10-CM

## 2024-01-24 DIAGNOSIS — M54.2 CHRONIC NECK PAIN: ICD-10-CM

## 2024-01-24 DIAGNOSIS — M54.50 CHRONIC BILATERAL LOW BACK PAIN WITHOUT SCIATICA: ICD-10-CM

## 2024-01-24 DIAGNOSIS — R06.01 ORTHOPNEA: Primary | ICD-10-CM

## 2024-01-24 DIAGNOSIS — G89.4 CHRONIC PAIN SYNDROME: ICD-10-CM

## 2024-01-24 DIAGNOSIS — E83.39 ADULT HYPOPHOSPHATASIA: ICD-10-CM

## 2024-01-24 DIAGNOSIS — G89.29 CHRONIC SI JOINT PAIN: ICD-10-CM

## 2024-01-24 DIAGNOSIS — M77.41 METATARSALGIA OF BOTH FEET: ICD-10-CM

## 2024-01-24 DIAGNOSIS — M25.50 POLYARTHRALGIA: ICD-10-CM

## 2024-01-24 DIAGNOSIS — F90.0 ATTENTION DEFICIT HYPERACTIVITY DISORDER (ADHD), PREDOMINANTLY INATTENTIVE TYPE: ICD-10-CM

## 2024-01-24 DIAGNOSIS — M54.12 CERVICAL RADICULOPATHY: ICD-10-CM

## 2024-01-24 DIAGNOSIS — M53.3 CHRONIC SI JOINT PAIN: ICD-10-CM

## 2024-01-24 DIAGNOSIS — G89.29 CHRONIC BILATERAL LOW BACK PAIN WITHOUT SCIATICA: ICD-10-CM

## 2024-01-24 DIAGNOSIS — M53.86 DECREASED RANGE OF MOTION OF LUMBAR SPINE: ICD-10-CM

## 2024-01-24 DIAGNOSIS — G89.29 CHRONIC NECK PAIN: ICD-10-CM

## 2024-01-24 PROCEDURE — 1159F MED LIST DOCD IN RCRD: CPT | Mod: CPTII,S$GLB,, | Performed by: FAMILY MEDICINE

## 2024-01-24 PROCEDURE — 3079F DIAST BP 80-89 MM HG: CPT | Mod: CPTII,S$GLB,, | Performed by: FAMILY MEDICINE

## 2024-01-24 PROCEDURE — 1160F RVW MEDS BY RX/DR IN RCRD: CPT | Mod: CPTII,S$GLB,, | Performed by: FAMILY MEDICINE

## 2024-01-24 PROCEDURE — 3074F SYST BP LT 130 MM HG: CPT | Mod: CPTII,S$GLB,, | Performed by: FAMILY MEDICINE

## 2024-01-24 PROCEDURE — 99214 OFFICE O/P EST MOD 30 MIN: CPT | Mod: S$GLB,,, | Performed by: FAMILY MEDICINE

## 2024-01-24 PROCEDURE — 3008F BODY MASS INDEX DOCD: CPT | Mod: CPTII,S$GLB,, | Performed by: FAMILY MEDICINE

## 2024-01-24 RX ORDER — DEXTROAMPHETAMINE SACCHARATE, AMPHETAMINE ASPARTATE MONOHYDRATE, DEXTROAMPHETAMINE SULFATE, AMPHETAMINE SULFATE 6.25; 6.25; 6.25; 6.25 MG/1; MG/1; MG/1; MG/1
1 CAPSULE, EXTENDED RELEASE ORAL DAILY
Qty: 30 EACH | Refills: 0 | Status: SHIPPED | OUTPATIENT
Start: 2024-01-24 | End: 2024-02-08

## 2024-01-24 RX ORDER — HYDROCHLOROTHIAZIDE 25 MG/1
25 TABLET ORAL DAILY
Qty: 90 TABLET | Refills: 1 | Status: SHIPPED | OUTPATIENT
Start: 2024-01-24 | End: 2024-04-02

## 2024-01-24 NOTE — PROGRESS NOTES
Subjective     Patient ID: Margareth Echeverria is a 44 y.o. female.    Chief Complaint: Medication Refill and Leg Swelling (Patient states that it has been going on for a really long time. Its both legs. )    HPI    New prob  Reports b/l leg swelling and redness, becomes painful when she is on her feet even a short time  She is under the care of shona cuevas now for pain mgt and is doing great  Vyvanse has been unavailable - we discussed trying an alternative    Review of Systems   Constitutional:  Negative for chills, diaphoresis, fatigue, fever and unexpected weight change.   HENT:  Negative for nasal congestion, hearing loss, rhinorrhea, sinus pressure/congestion, sore throat, trouble swallowing and voice change.    Eyes:  Negative for pain and visual disturbance.   Respiratory:  Negative for cough, chest tightness, shortness of breath and wheezing.    Cardiovascular:  Positive for leg swelling. Negative for chest pain and palpitations.   Gastrointestinal:  Negative for abdominal pain, constipation, diarrhea, nausea and vomiting.   Genitourinary:  Negative for decreased urine volume, dysuria, flank pain, frequency, hematuria, pelvic pain, vaginal bleeding and vaginal discharge.   Musculoskeletal:  Negative for arthralgias, back pain, myalgias and neck pain.   Integumentary:  Negative for color change, pallor and rash.   Neurological:  Negative for dizziness, syncope, weakness, light-headedness and headaches.   Hematological:  Negative for adenopathy.   Psychiatric/Behavioral:  Negative for decreased concentration, dysphoric mood and sleep disturbance. The patient is not nervous/anxious.           Objective     Physical Exam  Vitals reviewed.   Constitutional:       General: She is not in acute distress.     Appearance: She is well-developed. She is not diaphoretic.   HENT:      Head: Normocephalic and atraumatic.      Nose: Nose normal.      Mouth/Throat:      Mouth: Mucous membranes are moist.      Pharynx:  Oropharynx is clear.   Eyes:      Conjunctiva/sclera: Conjunctivae normal.      Pupils: Pupils are equal, round, and reactive to light.   Neck:      Thyroid: No thyromegaly.      Vascular: No JVD.   Cardiovascular:      Rate and Rhythm: Normal rate and regular rhythm.      Pulses: Normal pulses.      Heart sounds: Normal heart sounds. No murmur heard.     No friction rub. No gallop.   Pulmonary:      Effort: Pulmonary effort is normal. No respiratory distress.      Breath sounds: Normal breath sounds. No stridor. No wheezing or rales.   Abdominal:      General: Bowel sounds are normal. There is no distension.      Palpations: Abdomen is soft.      Tenderness: There is no abdominal tenderness.   Musculoskeletal:         General: No swelling or tenderness.      Cervical back: Normal range of motion and neck supple.      Right lower leg: No edema.      Left lower leg: No edema.   Lymphadenopathy:      Cervical: No cervical adenopathy.   Skin:     General: Skin is warm and dry.      Coloration: Skin is not pale.      Findings: No erythema or rash.   Neurological:      Mental Status: She is alert and oriented to person, place, and time.   Psychiatric:         Mood and Affect: Mood normal.         Behavior: Behavior normal.            Assessment and Plan     BLE swelling - getting echo and labs  Add - switching to mydayis if possible  Reviewed notes from dr. Steve and care plan           No follow-ups on file.

## 2024-01-26 ENCOUNTER — PATIENT MESSAGE (OUTPATIENT)
Dept: PAIN MEDICINE | Facility: CLINIC | Age: 44
End: 2024-01-26
Payer: COMMERCIAL

## 2024-01-26 ENCOUNTER — PATIENT MESSAGE (OUTPATIENT)
Dept: PRIMARY CARE CLINIC | Facility: CLINIC | Age: 44
End: 2024-01-26
Payer: COMMERCIAL

## 2024-01-29 ENCOUNTER — PATIENT MESSAGE (OUTPATIENT)
Dept: PAIN MEDICINE | Facility: CLINIC | Age: 44
End: 2024-01-29
Payer: COMMERCIAL

## 2024-01-29 ENCOUNTER — PATIENT MESSAGE (OUTPATIENT)
Dept: PRIMARY CARE CLINIC | Facility: CLINIC | Age: 44
End: 2024-01-29
Payer: COMMERCIAL

## 2024-01-29 ENCOUNTER — TELEPHONE (OUTPATIENT)
Dept: FAMILY MEDICINE | Facility: CLINIC | Age: 44
End: 2024-01-29
Payer: COMMERCIAL

## 2024-01-31 ENCOUNTER — TELEPHONE (OUTPATIENT)
Dept: PRIMARY CARE CLINIC | Facility: CLINIC | Age: 44
End: 2024-01-31
Payer: COMMERCIAL

## 2024-01-31 DIAGNOSIS — G62.9 NEUROPATHY: Primary | ICD-10-CM

## 2024-01-31 RX ORDER — NORTRIPTYLINE HYDROCHLORIDE 10 MG/1
10 CAPSULE ORAL NIGHTLY
Qty: 30 CAPSULE | Refills: 2 | Status: SHIPPED | OUTPATIENT
Start: 2024-01-31 | End: 2024-04-16 | Stop reason: SDUPTHER

## 2024-02-02 ENCOUNTER — PATIENT MESSAGE (OUTPATIENT)
Dept: PRIMARY CARE CLINIC | Facility: CLINIC | Age: 44
End: 2024-02-02
Payer: COMMERCIAL

## 2024-02-02 ENCOUNTER — TELEPHONE (OUTPATIENT)
Dept: PRIMARY CARE CLINIC | Facility: CLINIC | Age: 44
End: 2024-02-02
Payer: COMMERCIAL

## 2024-02-05 ENCOUNTER — PATIENT MESSAGE (OUTPATIENT)
Dept: PRIMARY CARE CLINIC | Facility: CLINIC | Age: 44
End: 2024-02-05
Payer: COMMERCIAL

## 2024-02-06 ENCOUNTER — PATIENT MESSAGE (OUTPATIENT)
Dept: PRIMARY CARE CLINIC | Facility: CLINIC | Age: 44
End: 2024-02-06
Payer: COMMERCIAL

## 2024-02-07 RX ORDER — LISDEXAMFETAMINE DIMESYLATE CAPSULES 20 MG/1
20 CAPSULE ORAL EVERY MORNING
Qty: 30 CAPSULE | Refills: 0 | Status: SHIPPED | OUTPATIENT
Start: 2024-02-07 | End: 2024-02-08 | Stop reason: SDUPTHER

## 2024-02-08 ENCOUNTER — PATIENT MESSAGE (OUTPATIENT)
Dept: PRIMARY CARE CLINIC | Facility: CLINIC | Age: 44
End: 2024-02-08
Payer: COMMERCIAL

## 2024-02-09 ENCOUNTER — PATIENT MESSAGE (OUTPATIENT)
Dept: PRIMARY CARE CLINIC | Facility: CLINIC | Age: 44
End: 2024-02-09
Payer: COMMERCIAL

## 2024-02-09 RX ORDER — LISDEXAMFETAMINE DIMESYLATE CAPSULES 20 MG/1
20 CAPSULE ORAL EVERY MORNING
Qty: 30 CAPSULE | Refills: 0 | Status: SHIPPED | OUTPATIENT
Start: 2024-02-09 | End: 2024-03-14 | Stop reason: SDUPTHER

## 2024-02-17 DIAGNOSIS — Z79.891 LONG TERM (CURRENT) USE OF OPIATE ANALGESIC: ICD-10-CM

## 2024-02-17 DIAGNOSIS — G89.4 CHRONIC PAIN SYNDROME: ICD-10-CM

## 2024-02-19 RX ORDER — HYDROCODONE BITARTRATE AND ACETAMINOPHEN 5; 325 MG/1; MG/1
1 TABLET ORAL
Qty: 150 TABLET | Refills: 0 | Status: SHIPPED | OUTPATIENT
Start: 2024-02-19 | End: 2024-03-17 | Stop reason: SDUPTHER

## 2024-03-14 DIAGNOSIS — F90.0 ATTENTION DEFICIT HYPERACTIVITY DISORDER (ADHD), PREDOMINANTLY INATTENTIVE TYPE: Primary | ICD-10-CM

## 2024-03-17 DIAGNOSIS — Z79.891 LONG TERM (CURRENT) USE OF OPIATE ANALGESIC: ICD-10-CM

## 2024-03-17 DIAGNOSIS — G89.4 CHRONIC PAIN SYNDROME: ICD-10-CM

## 2024-03-18 RX ORDER — LISDEXAMFETAMINE DIMESYLATE CAPSULES 20 MG/1
20 CAPSULE ORAL EVERY MORNING
Qty: 30 CAPSULE | Refills: 0 | Status: SHIPPED | OUTPATIENT
Start: 2024-03-18

## 2024-03-18 RX ORDER — HYDROCODONE BITARTRATE AND ACETAMINOPHEN 5; 325 MG/1; MG/1
1 TABLET ORAL
Qty: 150 TABLET | Refills: 0 | Status: SHIPPED | OUTPATIENT
Start: 2024-03-20 | End: 2024-04-17 | Stop reason: SDUPTHER

## 2024-04-02 ENCOUNTER — OFFICE VISIT (OUTPATIENT)
Dept: PAIN MEDICINE | Facility: CLINIC | Age: 44
End: 2024-04-02
Payer: COMMERCIAL

## 2024-04-02 VITALS
HEART RATE: 94 BPM | DIASTOLIC BLOOD PRESSURE: 81 MMHG | HEIGHT: 64 IN | SYSTOLIC BLOOD PRESSURE: 112 MMHG | BODY MASS INDEX: 20.67 KG/M2 | WEIGHT: 121.06 LBS

## 2024-04-02 DIAGNOSIS — G89.4 CHRONIC PAIN SYNDROME: ICD-10-CM

## 2024-04-02 DIAGNOSIS — M25.50 POLYARTHRALGIA: Primary | ICD-10-CM

## 2024-04-02 DIAGNOSIS — M54.81 CERVICO-OCCIPITAL NEURALGIA: ICD-10-CM

## 2024-04-02 DIAGNOSIS — Z79.891 LONG TERM (CURRENT) USE OF OPIATE ANALGESIC: ICD-10-CM

## 2024-04-02 DIAGNOSIS — M54.12 CERVICAL RADICULOPATHY: ICD-10-CM

## 2024-04-02 PROCEDURE — 99214 OFFICE O/P EST MOD 30 MIN: CPT | Mod: S$GLB,,, | Performed by: PHYSICAL MEDICINE & REHABILITATION

## 2024-04-02 PROCEDURE — 3008F BODY MASS INDEX DOCD: CPT | Mod: CPTII,S$GLB,, | Performed by: PHYSICAL MEDICINE & REHABILITATION

## 2024-04-02 PROCEDURE — 3074F SYST BP LT 130 MM HG: CPT | Mod: CPTII,S$GLB,, | Performed by: PHYSICAL MEDICINE & REHABILITATION

## 2024-04-02 PROCEDURE — 3079F DIAST BP 80-89 MM HG: CPT | Mod: CPTII,S$GLB,, | Performed by: PHYSICAL MEDICINE & REHABILITATION

## 2024-04-02 PROCEDURE — 1160F RVW MEDS BY RX/DR IN RCRD: CPT | Mod: CPTII,S$GLB,, | Performed by: PHYSICAL MEDICINE & REHABILITATION

## 2024-04-02 PROCEDURE — 1159F MED LIST DOCD IN RCRD: CPT | Mod: CPTII,S$GLB,, | Performed by: PHYSICAL MEDICINE & REHABILITATION

## 2024-04-02 RX ORDER — DULOXETIN HYDROCHLORIDE 30 MG/1
30 CAPSULE, DELAYED RELEASE ORAL
COMMUNITY
Start: 2023-12-31

## 2024-04-02 RX ORDER — LINACLOTIDE 72 UG/1
72 CAPSULE, GELATIN COATED ORAL
COMMUNITY
Start: 2024-02-01

## 2024-04-02 RX ORDER — BACLOFEN 10 MG/1
5-10 TABLET ORAL DAILY PRN
Qty: 30 TABLET | Refills: 2 | Status: SHIPPED | OUTPATIENT
Start: 2024-04-02 | End: 2024-07-01

## 2024-04-02 NOTE — PROGRESS NOTES
[facility]      Chief Complaint:   Chief Complaint   Patient presents with    Neck Pain       History of Present Illness: Margareth Echeverria is a 44 y.o. female referred by Dr. Nelda Thornton for Pelvic pain.      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 and she has a history of chronic pelvic pain and LBP with prior hysterectomy and b/l oophorectomy and she was feeling pretty good in regards to her chronic pelvic pain until Mother's day where she developed rather sudden increase in her pelvic pain with no clear inciting event  Location: involves her entire pelvis, but seems to be primarily localized over her sacrum area  Radiation: She feels some radiation into her anterior thighs  Timing: constant  Quality: Aching, Throbbing, Pounding, Deep, Stabbing, and nagging and swelling  Exacerbating Factors: Bowel movements, valsalva, intercourse, sitting, standing prolonged  Alleviating Factors: nothing  Associated Symptoms: She has neuropathy in her legs/feet from prior to this incident. She feels weakness in her legs. She has chronic constipation, but has been having daily BMs. She does note recent weight loss (states about 30 lbs). She does note night sweats. She denies fevers, urinary incontinence/change in function and bowel incontinence/change in function    Nothing seems to be helping, she has tried heating, cold. She saw gynecology and was sent here and to pelvic floor rehab.     She denies aggravation with urination.     Severity: Currently: 8/10   Typical Range: 5-10/10     Exacerbation: 10/10     P = 8  E = 10  G = 10  Baseline PEG Score = 9.33    Interval History (06/29/2023):  She is mostly noting bilateral feet pain today. The pain is primarily in the metatarsal area, but can radiate up her ankles at times. Pain seems pretty constant, but worse with walking. She states she has been diagnosed with neuropathy in the past, but this pain seems  different.       Interval History (08/16/2023):  Margareth Echeverria returns today for follow up.  At the last clinic visit, encouraged her to start PT which has not been done due to back pain improved and dealing with stress from her current pain medicine physician.     Currently, the lower back pain is improved.     She is mostly having HA pain over the left side of her scalp. Pain radiates from the occiput to her parietal region and is constant and has been present for days. Stress seems to be aggravating it. She states she has occipital neuralgia and typically gets injections of her occipital nerves which helps her HA. Pain has been worsening because she has been under a lot of stress. Her current physician managing her pain medicine, Dr. Anaya, has discharged her from clinic for no reason due to her having a procedure (SIJ CSI) with this clinic which helped her. She did nothing to breach her pain contract, but she has been having more difficulty with sleeping and worsening pain with all the stress he has been putting her through. She is prescribed hydrocodone/APAP 10/325 mg QID PRN and states she was not needing to take it as often until he discharged her out of the blue, just because she had her procedure done with this clinic instead of with the interventional pain physicians at the clinic that Dr. Anaya works.     She also notes continued bilateral feet pain, but now pain worse over bilateral heels and radiates anteriorly and worst 1st in AM.       Interval History (10/13/2023):  Neck pain: She is mostly having the pain in the left cervico-occipital region and this radiates into the left trapezius and down the left arm with numbness and tingling into the left hand. She denies any clear changes in strength recently. She denies changes in b/b function.       Interval History (01/23/2024):  Margareth Echeverria returns today for follow up.  At the last clinic visit, scheduled C7-T1 IL ALEKSANDR with local/MAC and re-sent  order for PT.     C7-T1 IL ALEKSANDR with local/MAC (1/8/24)  provided 80% relief. Patient is experiencing stiffness and tightness of the neck.     Currently, the Neck pain is improved.  Printing more bilateral low back pain.  This pain is over her bilateral PSIS which is similar to her prior back pain that responded to sacroiliac joint injection.  This pain started in the past few weeks since starting a new job and being more active.  She states she has been doing her home exercise program for the sacroiliac joints, but the pain is persisting.    Current Pain Scales:  Current: 3/10              Typical Range: 0-3/10    Interval History (04/02/2024):  Margareth Echeverria returns today for follow up.  At the last clinic visit, adjusted medication- Cont etodolac PRN, refill of Norco 5/325 mg we will decrease to q 4-6 hrs PRN pain, Cont tizanidine 4-8 mg qHS, Cont cymbalta 30-60 mg, Cont gabapentin 300 mg qHS for HA ppx and chronic pain, Pain contract signed 08/16/2023.  reviewed.    Currently, the neck pain is improved.      She did break her nose since she was last seen. She continues to take the norco 5/325 mg 5x/d and this is helping. SHe wants to try to go to 4.5 tablets per day, but is nervous about the transition. She cont to take the tizanidine 4-8 mg at night, but has been getting dizzy.       Current Pain Scales:  Current: 3/10              Typical Range: 2-4/10     Current PEG Score: 3      Opioid Risk Score         Value Time User    Opioid Risk Score  7 6/8/2023 10:19 AM Katharine Steve MD           Previous Interventions:  - 8/16/23: left DANY and SHANELLE block in clinic with US guidance w/ 4 mg/mL dexamethasone and lido 1% which provided nearly complete relief.   - 6/23/23: B/L SIJ CSI w/ > 50% relief in pain and improved fxn.   - Procedures for neck and back pain, but not for pelvic pain    Previous Therapies:  PT/OT: yes for back pain but not pelvic floor  Relevant Surgery: yes    - surgery for trigeminal  neuralgia  Previous Medications:   - NSAIDS:   - Muscle Relaxants: Zanaflex 2mg   - TCAs:   - SNRIs: cymbalta  - Topicals: CBD  - Anticonvulsants:  gabapentin made her feel drunk  - Opioids: hydrocodone    Current Pain Medications:  Cymbalta 30 mg  Norco 5/325mg   Tizanidine 2 mg   Gabapentin 300 mg     Blood Thinners: No    Full Medication List:    Current Outpatient Medications:     COMPOUND HORMONE REPLACEMENT, Take by mouth once daily. Inject 1 pellet Q 3-4 months to hip, Disp: , Rfl:     DULoxetine (CYMBALTA) 30 MG capsule, Take 30 mg by mouth., Disp: , Rfl:     etodolac (LODINE) 300 MG Cap, TAKE 1 CAPSULE (300 MG TOTAL) BY MOUTH 2 (TWO) TIMES DAILY AS NEEDED (PAIN AND INFLAMMATION)., Disp: 60 capsule, Rfl: 0    gabapentin (NEURONTIN) 300 MG capsule, Take 1 capsule (300 mg total) by mouth every evening., Disp: 30 capsule, Rfl: 11    HYDROcodone-acetaminophen (NORCO) 5-325 mg per tablet, Take 1 tablet by mouth every 4 to 6 hours as needed for Pain., Disp: 150 tablet, Rfl: 0    LINZESS 72 mcg Cap capsule, Take 72 mcg by mouth before breakfast., Disp: , Rfl:     lisdexamfetamine (VYVANSE) 20 MG capsule, Take 1 capsule (20 mg total) by mouth every morning., Disp: 30 capsule, Rfl: 0    nortriptyline (PAMELOR) 10 MG capsule, Take 1 capsule (10 mg total) by mouth every evening., Disp: 30 capsule, Rfl: 2    ondansetron (ZOFRAN) 4 MG tablet, Take 1 tablet (4 mg total) by mouth every 8 (eight) hours as needed for Nausea., Disp: 30 tablet, Rfl: 0    tiZANidine (ZANAFLEX) 4 MG tablet, Take 1-2 tablets (4-8 mg total) by mouth nightly as needed (pain and sleep)., Disp: 60 tablet, Rfl: 5    baclofen (LIORESAL) 10 MG tablet, Take 0.5-1 tablets (5-10 mg total) by mouth daily as needed (pain or spasm)., Disp: 30 tablet, Rfl: 2     Review of Systems:  ROS    Allergies:  Opioids - morphine analogues, Morphine, and Tramadol     Medical History:   has a past medical history of ADHD (attention deficit hyperactivity disorder), Adult  "hypophosphatasia, AV block, Fibromyalgia, IBS (irritable bowel syndrome), IC (interstitial cystitis), Occipital neuralgia, and Trigeminal neuralgia.    Surgical History:   has a past surgical history that includes Inguinal hernia repair (Bilateral); Laparoscopic cholecystectomy; Augmentation of breast; Fulguration of endometriosis (07/05/2016); Laparoscopy-assisted vaginal hysterectomy (11/11/2019); Laparoscopy (12/08/2019); Appendectomy; Cystoscopy with hydrodistension and biopsy of bladder (12/28/2020); Removal of breast implant (04/2021); and Hysterectomy.    Family History:  family history includes Breast cancer in her maternal cousin; Lung cancer in her paternal grandmother; No Known Problems in her father and mother.    Social History:   reports that she quit smoking about 12 years ago. Her smoking use included cigarettes. She started smoking about 22 years ago. She has a 5.0 pack-year smoking history. She has never been exposed to tobacco smoke. She has never used smokeless tobacco. She reports current alcohol use. She reports current drug use. Drug: Marijuana.    Physical Exam:  /81   Pulse 94   Ht 5' 4" (1.626 m)   Wt 54.9 kg (121 lb 0.5 oz)   BMI 20.78 kg/m²   GEN: No acute distress. Calm, comfortable  HENT: Normocephalic, atraumatic, moist mucous membranes  EYE: Anicteric sclera, non-injected.   CV: Non-diaphoretic. Regular Rate. Radial Pulses 2+.  RESP: Breathing comfortably. Chest expansion symmetric.  EXT: No clubbing, cyanosis.   SKIN: Warm, & dry to palpation. No visible rashes or lesions of exposed skin.   PSYCH: Pleasant mood and appropriate affect. Recent and remote memory intact.   GAIT: Independent, normal ambulation  Neck Exam:       Inspection: No erythema, bruising.       Palpation: (+) TTP of left cervical paraspinals and over SHANELLE and DANY      ROM: No significant Limitation in flexion, extension, lateral bending or rotation      Provocative Maneuvers:  (-) Spurling's " bilaterally  Lumbar Spine Exam:       Inspection: No bruising, swelling. Slight erythema noted at bilateral injection sites.        Palpation: No calor to the area. (+) TTP of b/l SIJ > lumbar paraspinals b/l. (+) TTP of sacrum and coccyx         ROM:  Limited in flexion, extension, lateral bending.       (+) Facet loading bilaterally      (-) Straight Leg Raise bilaterally      (+) ISABELLA bilaterally      (+) SIJ Compression Test bilaterally      (+) Gaenslan's Test bilaterally      (+) Thigh thrust/Posterior Pelvic Pain Provocation Test bilaterally      (+) Sacral thrust  Hip/Pelvis Exam:      Inspection: No gross deformity or apparent leg length discrepancy      Palpation: (+) TTP along inguinal ligaments b/l, pubic symphysis. (+) TTP right ischial bursa and b/l piriformis and gluteal muscles, (-) TTP to bilateral greater trochanteric bursas.       ROM:  No limitation or pain in internal rotation, external rotation b/l  Neurologic Exam:     Alert. Speech is fluent and appropriate.     Strength: 4/5 in left AbDM, wrist extension, 4/5 diffusely in the left leg, otherwise 5/5 throughout bilateral upper and lower extremities     Sensation:  Grossly intact to light touch in bilateral upper and lower extremities     Reflexes: 2+ in b/l BR, biceps, triceps, patella, achilles     Tone: No abnormality appreciated in bilateral lower extremities     No Clonus     Downgoing toes on plantar stimulation     (-) Franco bilaterally                Imaging:  - MRI C-spine 11/21/23:      - X-Ray Cervical Spine AP & Lateral 10/13/23     FINDINGS:  There is some reversal of the normal cervical lordosis.  Vertebral body heights are within normal limits.  No definite spondylolisthesis demonstrated.  Mild-to-moderate disc height loss noted at C5-6.  Mild disc height loss at C4-5. Posterior elements appear intact without acute fractures or subluxations demonstrated.  Odontoid process appears intact.  Atlantoaxial articulations appear  normal.  Prevertebral soft tissues are within normal limits.  Postoperative changes are noted involving the skull on the left in the suboccipital region region.     Impression:     Degenerative findings as above    - MRI L-spine 6/13/23:  Vertebral body alignment is preserved. Vertebral body heights are maintained. Bone marrow signal is within normal limits. The conus medullaris terminates normally at the level of L2. Lumbar nerve roots have normal appearance. Preserved intervertebral disc space height and signal.    T12-L1: No significant canal or foraminal stenosis.    L1-L2: No significant canal or foraminal stenosis.    L2-L3: No significant canal or foraminal stenosis.    L3-L4: No significant canal or foraminal stenosis.    L4-L5: Broad-based disc bulge, ligamentum flavum thickening, facet hypertrophy with at most mild canal stenosis. Mild effacement of the lateral recesses. No significant foraminal stenosis.    L5-S1: No significant canal or foraminal stenosis.        - MRI Pelvis w.o 6/13/23:  The femoral heads are well formed and seated within well formed acetabula. Bone marrow signal is normal. The sacroiliac joints are intact. There is no muscle atrophy or edema. There is no intrapelvic visceral abnormalities. Prior hysterectomy. There is no evidence of fluid collection. There is mild right hamstring origin tendinosis. The gluteus medius and minimus tendon insertions are intact. The hip adductors are intact. There is no evidence of iliopsoas or trochanteric bursitis.    Impression:    Mild right hamstring origin tendinosis. Otherwise, negative pelvic MRI.      - X-ray lumbar spine 6/8/23:  Transitional anatomy with sacralization of L5.  Vertebral body heights maintained.  No spondylolisthesis.  Disc spaces maintained.  Mild facet arthropathy.  No acute osseous abnormality.  Bilateral SI joint degenerative changes.  Constipation suspected.    - X-ray pelvis 6/8/23:  Hip joint spaces maintained.  Likely  transitional anatomy with L5 sacralization.  Symmetric degenerative findings of the bilateral SI joints.  Constipation findings noted.    - Pelvis CT w/o contrast 1/6/23:  The visualized gastrointestinal tract appears normal.  Urinary bladder is normal.  Inguinal regions are normal.  No masses, fluid collections or adenopathy seen.  1. There is a cystic area on the left side of the pelvis measuring about 5.8 cm. This has a simple cystic appearance to it. Statistically this most likely represents a ovarian cyst. The patient's uterus has apparently beenremoved.  The bony elements appear intact.  Impression:  1. Cyst on the left side of the pelvis. Statistically most likely represents an ovarian cyst. Evaluation with pelvic ultrasound is recommended.    - Pelvic US 12/21/22:  Transabdominal and transvaginal pelvic ultrasound was performed by the sonographer. Static images are submitted. Uterus is not visualized consistent with patient's history of previous partial hysterectomy. Right  ovary is not visualized. Patient provides a history of previous right oophorectomy. Left ovary measures 3.4 x 3.9 x 2.9 cm in size. Multiple left ovarian follicles are visualized. Small left ovarian cyst is visualized measuring 1.8 cm in diameter. Left ovarian blood flow is present. Trace amount of pelvic free fluid is present adjacent to the vaginal cuff.  IMPRESSION:  1. Nonvisualization of the uterus and right ovary consistent with patient's surgical history.  2. Small 1.8 cm left ovarian cyst.  3. Trace amount of pelvic free fluid.    - MRI C-spine 9/26/22:  FINDINGS:  Reversal of the normal cervical lordotic curvature. The vertebral body heights and alignment are maintained throughout the cervical spine. No abnormal vertebral body marrow signal. Multilevel intervertebral disc desiccation.  Spinal cord is normal in caliber and demonstrates normal signal. The visualized portions of the cervicomedullary junction are  unremarkable.  C2-C3: Normal intervertebral disc contour. No central canal or neural foraminal narrowing.  C3-C4: Intervertebral disc bulge narrows the ventral subarachnoid space. No central canal or neural foraminal narrowing.  C4-C5: Intervertebral disc bulge eccentric to the right side. There is narrowing of the ventral subarachnoid space. The central canal is borderline measuring 10 mm. Moderate right and mild left neural foraminal narrowing.   C5-C6: Intervertebral disc bulge with superimposed left paracentral disc protrusion. There is narrowing of the ventral subarachnoid space and left subarticular recess. Bilateral uncovertebral and facet hypertrophy. Moderate bilateral neural foraminal narrowing. The central canal is narrowed to 8 mm.  C6-C7: Intervertebral disc bulge narrows the ventral subarachnoid space. The central canal is narrowed to 9 mm. No neural foraminal narrowing.  C7-T1: Normal intervertebral disc contour. No central canal or neural foraminal narrowing.  The paravertebral soft tissues are unremarkable.  IMPRESSION:  Multilevel cervical spondylosis most prominent at C4-7. The findings are not significantly changed when compared to the prior exam.    - MRI Lumbar spine 8/17/21:  FINDINGS:  Alignment: Normal.  Vertebral bodies: Normal height and marrow signal.  T12-L1: No significant spinal canal stenosis or neuroforaminal narrowing.  L1-L2: No significant spinal canal stenosis or neuroforaminal narrowing.  L2-L3: No significant spinal canal stenosis or neuroforaminal narrowing.  L3-L4: No significant spinal canal stenosis or neuroforaminal narrowing.  L4-L5: No significant spinal canal stenosis or neuroforaminal narrowing.  L5-S1: No significant spinal canal stenosis or neuroforaminal narrowing.  Spinal cord: The cord extends down to the L1 level. The cord has a normal size, configuration and signal pattern.    - MRI Thoracic spine w/o 6/15/20:  FINDINGS:  Vertebral bodies: Normal vertebral body  height, alignment and marrow signal.  C7-T1: The disc is normal. The central canal and neural foramen appear normal. No displacement or compression of the neural elements are seen.  T1-T2: The disc has a normal contour. The central canal and neural foramen appear normal. No displacement or compression of the neural elements are seen.  T2-T3: The disc has a normal contour. The central canal and neural foramen appear normal. No displacement or compression of the neural elements are seen.  T3-T4: The disc has a normal contour. The central canal and neural foramen appear normal. No displacement or compression of the neural elements are seen.  T4-T5: The disc has a normal contour. The central canal and neural foramen appear normal. No displacement or compression of the neural elements are seen.  T5-T6: The disc has a normal contour. The central canal and neural foramen appear normal. No displacement or compression of the neural elements are seen.  T6-T7: The disc has a normal contour. The central canal and neural foramen appear normal. No displacement or compression of the neural elements are seen.  T7-T8: The disc has a normal contour. The central canal and neural foramen appear normal. No displacement or compression of the neural elements are seen.  T8-T9: The disc has a normal contour. The central canal and neural foramen appear normal. No displacement or compression of the neural elements are seen.  T9-T10: The disc has a normal contour. The central canal and neural foramen appear normal. No displacement or compression of the neural elements are seen.  T10-T11: The disc has a normal contour. The central canal and neural foramen appear normal. No displacement or compression of the neural elements are seen.  T11-T12: The disc has a normal contour. The central canal and neural foramen appear normal. No displacement or compression of the neural elements are seen.  T12-L1: The disc has a normal contour. The central canal  and neural foramen appear normal. No displacement or compression of the neural elements are seen.  Spinal cord: The cord has a normal size, configuration and signal pattern.  Additional findings: None seen.      Labs:  BMP  Lab Results   Component Value Date     08/16/2023    K 5.4 (H) 08/16/2023     08/16/2023    CO2 29 08/16/2023    BUN 22.2 (H) 08/16/2023    CREATININE 0.74 08/16/2023    CALCIUM 10.0 08/16/2023    ANIONGAP 9.0 08/16/2023     Lab Results   Component Value Date    AST 16 08/16/2023     Lab Results   Component Value Date     08/16/2023       Assessment:  Margareth Echeverria is a 44 y.o. female with the following diagnoses based on history, exam, and imaging:    Problem List Items Addressed This Visit    None  Visit Diagnoses       Polyarthralgia    -  Primary    Relevant Medications    baclofen (LIORESAL) 10 MG tablet    Other Relevant Orders    Pain Clinic Drug Screen    Long term (current) use of opiate analgesic        Relevant Medications    baclofen (LIORESAL) 10 MG tablet    Other Relevant Orders    Pain Clinic Drug Screen    Cervical radiculopathy        Relevant Medications    baclofen (LIORESAL) 10 MG tablet    Other Relevant Orders    Pain Clinic Drug Screen    Chronic pain syndrome        Relevant Medications    baclofen (LIORESAL) 10 MG tablet    Other Relevant Orders    Pain Clinic Drug Screen    Cervico-occipital neuralgia        Relevant Medications    baclofen (LIORESAL) 10 MG tablet    Other Relevant Orders    Pain Clinic Drug Screen                      This is a pleasant 44 y.o. lady presenting with:     - Neck pain and cervicogenic HA and left radicular arm pain. Appears to have occipital neuralgia on left which improved after occipital nerve blocks. She also has some myofascial pain on exam.   - Chronic bilateral low back pain: Pain appears primarily due to SIJ dysfunction on exam, but may have facet and myofascial contributions as well.    - Back pain improved  after bilateral SIJ CSI   - She does note some radiation into the legs in L4/5 distribution and MRI with mild canal stenosis at L4-5 with facet arthropathy  - Chronic pelvic pain: Pain appears multifactorial and she has a complex history with prior hysterectomy for endometriosis, interstitial cystitis, b/l oophorectomy for ovarian cyst, prior bilateral inguinal hernia repair and hypophosphatasia. Unclear exactly what the cause of the pain is at this point as she has multiple areas of pain.    - SIJ CSI did help some of the anterior pelvis pain as well  - History of fibromyalgia.   - History of familial hypophosphatasia and h/o polyarthralgia that was   - Comorbidities: Depression. ADHD. Hypophosphatasia. H/o Trigeminal neuralgia. IBS w/ constipation. H/o AV block. Former smoker. Allergy to opioids.     Treatment Plan:   - PT/OT/HEP: Encouraged cont HEP for her SIJ pain and back pain.    - Discussed benefits of exercise for pain.   - Procedures: Performed bilateral lumbosacral TPIs and left gluteal TPI with lido/bupi (no steroid)  - Can repeat C7-T1 IL ALEKSANDR with local/MAC PRN. She responded very well to this.    - If limited relief with ALEKSANDR, consider left C2-3, C3-4 diagnostic MBBs.   - Consider repeat b/l SIJ CSI if back pain returns/worsens  - Consider L4-5 IL ALEKSANDR for radicular leg pains   - Consider b/l superior hypogastric plexus block for chronic pelvic pain   - Consider ganglion impar and sacrococcygeal injection for coccydynia  - Medications:   - Cont Norco 5/325 mg q 4-6 hrs PRN pain (5 tab/d or #150 per month). This is 25 MME. Attempt to decrease to 4.5/d.  - Add baclofen 5-10 mg daily PRN. Try to switch one hydrocodone pill for a baclofen tablet   - Cont tizanidine 4-8 mg qHS.    - Cont cymbalta 30 mg daily (didn't tolerate 60 mg)   - Holding gabapentin 300 mg qHS for HA ppx and chronic pain.    - Pain contract signed 08/16/2023    -  reviewed  - Imaging: Reviewed.   - Labs: Reviewed.  Medications are  appropriately dosed for current hepatorenal function.  - Consulted rheumatology regarding hypophosphatasia, pending    Follow Up: RTC 1 month for FELTON w/ MD or sooner PRN      Kahtarine Steve M.D.  Interventional Pain Medicine / Physical Medicine & Rehabilitation

## 2024-04-16 ENCOUNTER — PATIENT MESSAGE (OUTPATIENT)
Dept: PRIMARY CARE CLINIC | Facility: CLINIC | Age: 44
End: 2024-04-16
Payer: COMMERCIAL

## 2024-04-16 DIAGNOSIS — G62.9 NEUROPATHY: ICD-10-CM

## 2024-04-16 RX ORDER — NORTRIPTYLINE HYDROCHLORIDE 10 MG/1
10 CAPSULE ORAL NIGHTLY
Qty: 90 CAPSULE | Refills: 3 | Status: SHIPPED | OUTPATIENT
Start: 2024-04-16 | End: 2025-04-16

## 2024-04-17 DIAGNOSIS — Z79.891 LONG TERM (CURRENT) USE OF OPIATE ANALGESIC: ICD-10-CM

## 2024-04-17 DIAGNOSIS — G89.4 CHRONIC PAIN SYNDROME: ICD-10-CM

## 2024-04-18 RX ORDER — HYDROCODONE BITARTRATE AND ACETAMINOPHEN 5; 325 MG/1; MG/1
1 TABLET ORAL
Qty: 150 TABLET | Refills: 0 | Status: SHIPPED | OUTPATIENT
Start: 2024-04-19 | End: 2024-05-02 | Stop reason: SDUPTHER

## 2024-04-24 ENCOUNTER — PATIENT MESSAGE (OUTPATIENT)
Dept: PRIMARY CARE CLINIC | Facility: CLINIC | Age: 44
End: 2024-04-24
Payer: COMMERCIAL

## 2024-04-24 DIAGNOSIS — Z00.00 WELL ADULT ON ROUTINE HEALTH CHECK: Primary | ICD-10-CM

## 2024-04-25 ENCOUNTER — PATIENT MESSAGE (OUTPATIENT)
Dept: PRIMARY CARE CLINIC | Facility: CLINIC | Age: 44
End: 2024-04-25
Payer: COMMERCIAL

## 2024-04-25 NOTE — TELEPHONE ENCOUNTER
Patient would like to switch her daughter's appointment to her mother Kathy Lejeune. [phone] please call

## 2024-05-02 ENCOUNTER — OFFICE VISIT (OUTPATIENT)
Dept: PAIN MEDICINE | Facility: CLINIC | Age: 44
End: 2024-05-02
Payer: COMMERCIAL

## 2024-05-02 DIAGNOSIS — S02.2XXS CLOSED FRACTURE OF NASAL BONE, SEQUELA: Primary | ICD-10-CM

## 2024-05-02 DIAGNOSIS — G89.4 CHRONIC PAIN SYNDROME: ICD-10-CM

## 2024-05-02 DIAGNOSIS — Z79.891 LONG TERM (CURRENT) USE OF OPIATE ANALGESIC: ICD-10-CM

## 2024-05-02 PROCEDURE — 99214 OFFICE O/P EST MOD 30 MIN: CPT | Mod: 95,,, | Performed by: PHYSICAL MEDICINE & REHABILITATION

## 2024-05-02 PROCEDURE — 1160F RVW MEDS BY RX/DR IN RCRD: CPT | Mod: CPTII,95,, | Performed by: PHYSICAL MEDICINE & REHABILITATION

## 2024-05-02 PROCEDURE — 1159F MED LIST DOCD IN RCRD: CPT | Mod: CPTII,95,, | Performed by: PHYSICAL MEDICINE & REHABILITATION

## 2024-05-02 RX ORDER — HYDROCODONE BITARTRATE AND ACETAMINOPHEN 5; 325 MG/1; MG/1
1 TABLET ORAL
Qty: 150 TABLET | Refills: 0 | Status: SHIPPED | OUTPATIENT
Start: 2024-05-18 | End: 2024-06-15 | Stop reason: SDUPTHER

## 2024-05-02 NOTE — PROGRESS NOTES
Pain Medicine  Telemedicine Virtual Visit    The patient location is: [place]  The chief complaint leading to consultation is: Nose pain  Visit type: Virtual visit with synchronous audio and video  Total time spent with patient: 19 mins  Each patient to whom he or she provides medical services by telemedicine is:  (1) informed of the relationship between the physician and patient and the respective role of any other health care provider with respect to management of the patient; and (2) notified that he or she may decline to receive medical services by telemedicine and may withdraw from such care at any time.            Chief Complaint:   Chief Complaint   Patient presents with    Fibromyalgia       History of Present Illness: Margareth Echeverria is a 44 y.o. female referred by Dr. Nelda Thornton for Pelvic pain.      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 and she has a history of chronic pelvic pain and LBP with prior hysterectomy and b/l oophorectomy and she was feeling pretty good in regards to her chronic pelvic pain until Mother's day where she developed rather sudden increase in her pelvic pain with no clear inciting event  Location: involves her entire pelvis, but seems to be primarily localized over her sacrum area  Radiation: She feels some radiation into her anterior thighs  Timing: constant  Quality: Aching, Throbbing, Pounding, Deep, Stabbing, and nagging and swelling  Exacerbating Factors: Bowel movements, valsalva, intercourse, sitting, standing prolonged  Alleviating Factors: nothing  Associated Symptoms: She has neuropathy in her legs/feet from prior to this incident. She feels weakness in her legs. She has chronic constipation, but has been having daily BMs. She does note recent weight loss (states about 30 lbs). She does note night sweats. She denies fevers, urinary incontinence/change in function and bowel incontinence/change in  function    Nothing seems to be helping, she has tried heating, cold. She saw gynecology and was sent here and to pelvic floor rehab.     She denies aggravation with urination.     Severity: Currently: 8/10   Typical Range: 5-10/10     Exacerbation: 10/10     P = 8  E = 10  G = 10  Baseline PEG Score = 9.33    Interval History (06/29/2023):  She is mostly noting bilateral feet pain today. The pain is primarily in the metatarsal area, but can radiate up her ankles at times. Pain seems pretty constant, but worse with walking. She states she has been diagnosed with neuropathy in the past, but this pain seems different.       Interval History (08/16/2023):  Margareth Echeverria returns today for follow up.  At the last clinic visit, encouraged her to start PT which has not been done due to back pain improved and dealing with stress from her current pain medicine physician.     Currently, the lower back pain is improved.     She is mostly having HA pain over the left side of her scalp. Pain radiates from the occiput to her parietal region and is constant and has been present for days. Stress seems to be aggravating it. She states she has occipital neuralgia and typically gets injections of her occipital nerves which helps her HA. Pain has been worsening because she has been under a lot of stress. Her current physician managing her pain medicine, Dr. Anaya, has discharged her from clinic for no reason due to her having a procedure (SIJ CSI) with this clinic which helped her. She did nothing to breach her pain contract, but she has been having more difficulty with sleeping and worsening pain with all the stress he has been putting her through. She is prescribed hydrocodone/APAP 10/325 mg QID PRN and states she was not needing to take it as often until he discharged her out of the blue, just because she had her procedure done with this clinic instead of with the interventional pain physicians at the clinic that Dr. Anaya  works.     She also notes continued bilateral feet pain, but now pain worse over bilateral heels and radiates anteriorly and worst 1st in AM.       Interval History (10/13/2023):  Neck pain: She is mostly having the pain in the left cervico-occipital region and this radiates into the left trapezius and down the left arm with numbness and tingling into the left hand. She denies any clear changes in strength recently. She denies changes in b/b function.       Interval History (01/23/2024):  Margareth Echeverria returns today for follow up.  At the last clinic visit, scheduled C7-T1 IL ALEKSANDR with local/MAC and re-sent order for PT.     C7-T1 IL ALEKSANDR with local/MAC (1/8/24)  provided 80% relief. Patient is experiencing stiffness and tightness of the neck.     Currently, the Neck pain is improved.  Printing more bilateral low back pain.  This pain is over her bilateral PSIS which is similar to her prior back pain that responded to sacroiliac joint injection.  This pain started in the past few weeks since starting a new job and being more active.  She states she has been doing her home exercise program for the sacroiliac joints, but the pain is persisting.    Current Pain Scales:  Current: 3/10              Typical Range: 0-3/10    Interval History (04/02/2024):  Margareth Echeverria returns today for follow up.  At the last clinic visit, adjusted medication- Cont etodolac PRN, refill of Norco 5/325 mg we will decrease to q 4-6 hrs PRN pain, Cont tizanidine 4-8 mg qHS, Cont cymbalta 30-60 mg, Cont gabapentin 300 mg qHS for HA ppx and chronic pain, Pain contract signed 08/16/2023.  reviewed.    Currently, the neck pain is improved.      She did break her nose since she was last seen. She continues to take the norco 5/325 mg 5x/d and this is helping. SHe wants to try to go to 4.5 tablets per day, but is nervous about the transition. She cont to take the tizanidine 4-8 mg at night, but has been getting dizzy.     Current Pain  Scales:  Current: 3/10              Typical Range: 2-4/10     Interval History (05/02/2024):  Margareth Echeverria returns today for follow up.  At the last clinic visit, adding baclofen     Currently, the nose pain is her worst pain. She is hoping to see a facial plast surgeon as she still has silght deformity over that area.     She is planning on breast augmentation on 5/17.     Patient is currently taking Tizanidine, Baclofen, and Norco which provides her relief.  She has been able to occasionally switch hydrocodone for the baclofen.  She finds the baclofen is more tolerable during the day, and the tizanidine works better at nighttime for sleep.      Current Pain Scales:  Current: 4/10              Typical Range: 3-8/10      Current PEG Score: 3      Opioid Risk Score         Value Time User    Opioid Risk Score  7 6/8/2023 10:19 AM Katharine Steve MD           Previous Interventions:  - 8/16/23: left DANY and SHANELLE block in clinic with US guidance w/ 4 mg/mL dexamethasone and lido 1% which provided nearly complete relief.   - 6/23/23: B/L SIJ CSI w/ > 50% relief in pain and improved fxn.   - Procedures for neck and back pain, but not for pelvic pain    Previous Therapies:  PT/OT: yes for back pain but not pelvic floor  Relevant Surgery: yes    - surgery for trigeminal neuralgia  Previous Medications:   - NSAIDS:   - Muscle Relaxants: Zanaflex 2mg   - TCAs:   - SNRIs: cymbalta  - Topicals: CBD  - Anticonvulsants:  gabapentin made her feel drunk  - Opioids: hydrocodone    Current Pain Medications:  Cymbalta 30 mg  Norco 5/325mg   Tizanidine 2 mg   Gabapentin 300 mg   Baclofen 5-10    Blood Thinners: No    Full Medication List:    Current Outpatient Medications:     baclofen (LIORESAL) 10 MG tablet, Take 0.5-1 tablets (5-10 mg total) by mouth daily as needed (pain or spasm)., Disp: 30 tablet, Rfl: 2    COMPOUND HORMONE REPLACEMENT, Take by mouth once daily. Inject 1 pellet Q 3-4 months to hip, Disp: , Rfl:      DULoxetine (CYMBALTA) 30 MG capsule, Take 30 mg by mouth., Disp: , Rfl:     etodolac (LODINE) 300 MG Cap, TAKE 1 CAPSULE (300 MG TOTAL) BY MOUTH 2 (TWO) TIMES DAILY AS NEEDED (PAIN AND INFLAMMATION)., Disp: 60 capsule, Rfl: 0    gabapentin (NEURONTIN) 300 MG capsule, Take 1 capsule (300 mg total) by mouth every evening., Disp: 30 capsule, Rfl: 11    [START ON 5/18/2024] HYDROcodone-acetaminophen (NORCO) 5-325 mg per tablet, Take 1 tablet by mouth every 4 to 6 hours as needed for Pain., Disp: 150 tablet, Rfl: 0    LINZESS 72 mcg Cap capsule, Take 72 mcg by mouth before breakfast., Disp: , Rfl:     lisdexamfetamine (VYVANSE) 20 MG capsule, Take 1 capsule (20 mg total) by mouth every morning., Disp: 30 capsule, Rfl: 0    nortriptyline (PAMELOR) 10 MG capsule, Take 1 capsule (10 mg total) by mouth every evening., Disp: 90 capsule, Rfl: 3    ondansetron (ZOFRAN) 4 MG tablet, Take 1 tablet (4 mg total) by mouth every 8 (eight) hours as needed for Nausea., Disp: 30 tablet, Rfl: 0    tiZANidine (ZANAFLEX) 4 MG tablet, Take 1-2 tablets (4-8 mg total) by mouth nightly as needed (pain and sleep)., Disp: 60 tablet, Rfl: 5     Review of Systems:  ROS    Allergies:  Opioids - morphine analogues, Morphine, and Tramadol     Medical History:   has a past medical history of ADHD (attention deficit hyperactivity disorder), Adult hypophosphatasia, AV block, Fibromyalgia, IBS (irritable bowel syndrome), IC (interstitial cystitis), Occipital neuralgia, and Trigeminal neuralgia.    Surgical History:   has a past surgical history that includes Inguinal hernia repair (Bilateral); Laparoscopic cholecystectomy; Augmentation of breast; Fulguration of endometriosis (07/05/2016); Laparoscopy-assisted vaginal hysterectomy (11/11/2019); Laparoscopy (12/08/2019); Appendectomy; Cystoscopy with hydrodistension and biopsy of bladder (12/28/2020); Removal of breast implant (04/2021); and Hysterectomy.    Family History:  family history includes Breast  cancer in her maternal cousin; Lung cancer in her paternal grandmother; No Known Problems in her father and mother.    Social History:   reports that she quit smoking about 12 years ago. Her smoking use included cigarettes. She started smoking about 22 years ago. She has a 5 pack-year smoking history. She has never been exposed to tobacco smoke. She has never used smokeless tobacco. She reports current alcohol use. She reports current drug use. Drug: Marijuana.    Physical Exam:  There were no vitals taken for this visit.  GEN: No acute distress. Calm, comfortable  HENT: Normocephalic, atraumatic, moist mucous membranes  EYE: Anicteric sclera, non-injected.   CV: Non-diaphoretic. Regular Rate. Radial Pulses 2+.  RESP: Breathing comfortably. Chest expansion symmetric.  EXT: No clubbing, cyanosis.   SKIN: Warm, & dry to palpation. No visible rashes or lesions of exposed skin.   PSYCH: Pleasant mood and appropriate affect. Recent and remote memory intact.   GAIT: Independent, normal ambulation  Neck Exam:       Inspection: No erythema, bruising.       Palpation: (+) TTP of left cervical paraspinals and over SHANELLE and DANY      ROM: No significant Limitation in flexion, extension, lateral bending or rotation      Provocative Maneuvers:  (-) Spurling's bilaterally  Lumbar Spine Exam:       Inspection: No bruising, swelling. Slight erythema noted at bilateral injection sites.        Palpation: No calor to the area. (+) TTP of b/l SIJ > lumbar paraspinals b/l. (+) TTP of sacrum and coccyx         ROM:  Limited in flexion, extension, lateral bending.       (+) Facet loading bilaterally      (-) Straight Leg Raise bilaterally      (+) ISABELLA bilaterally      (+) SIJ Compression Test bilaterally      (+) Gaenslan's Test bilaterally      (+) Thigh thrust/Posterior Pelvic Pain Provocation Test bilaterally      (+) Sacral thrust  Hip/Pelvis Exam:      Inspection: No gross deformity or apparent leg length discrepancy      Palpation:  (+) TTP along inguinal ligaments b/l, pubic symphysis. (+) TTP right ischial bursa and b/l piriformis and gluteal muscles, (-) TTP to bilateral greater trochanteric bursas.       ROM:  No limitation or pain in internal rotation, external rotation b/l  Neurologic Exam:     Alert. Speech is fluent and appropriate.     Strength: 4/5 in left AbDM, wrist extension, 4/5 diffusely in the left leg, otherwise 5/5 throughout bilateral upper and lower extremities     Sensation:  Grossly intact to light touch in bilateral upper and lower extremities     Reflexes: 2+ in b/l BR, biceps, triceps, patella, achilles     Tone: No abnormality appreciated in bilateral lower extremities     No Clonus     Downgoing toes on plantar stimulation     (-) Franco bilaterally                Imaging:  - MRI C-spine 11/21/23:      - X-Ray Cervical Spine AP & Lateral 10/13/23     FINDINGS:  There is some reversal of the normal cervical lordosis.  Vertebral body heights are within normal limits.  No definite spondylolisthesis demonstrated.  Mild-to-moderate disc height loss noted at C5-6.  Mild disc height loss at C4-5. Posterior elements appear intact without acute fractures or subluxations demonstrated.  Odontoid process appears intact.  Atlantoaxial articulations appear normal.  Prevertebral soft tissues are within normal limits.  Postoperative changes are noted involving the skull on the left in the suboccipital region region.     Impression:     Degenerative findings as above    - MRI L-spine 6/13/23:  Vertebral body alignment is preserved. Vertebral body heights are maintained. Bone marrow signal is within normal limits. The conus medullaris terminates normally at the level of L2. Lumbar nerve roots have normal appearance. Preserved intervertebral disc space height and signal.    T12-L1: No significant canal or foraminal stenosis.    L1-L2: No significant canal or foraminal stenosis.    L2-L3: No significant canal or foraminal stenosis.     L3-L4: No significant canal or foraminal stenosis.    L4-L5: Broad-based disc bulge, ligamentum flavum thickening, facet hypertrophy with at most mild canal stenosis. Mild effacement of the lateral recesses. No significant foraminal stenosis.    L5-S1: No significant canal or foraminal stenosis.        - MRI Pelvis w.o 6/13/23:  The femoral heads are well formed and seated within well formed acetabula. Bone marrow signal is normal. The sacroiliac joints are intact. There is no muscle atrophy or edema. There is no intrapelvic visceral abnormalities. Prior hysterectomy. There is no evidence of fluid collection. There is mild right hamstring origin tendinosis. The gluteus medius and minimus tendon insertions are intact. The hip adductors are intact. There is no evidence of iliopsoas or trochanteric bursitis.    Impression:    Mild right hamstring origin tendinosis. Otherwise, negative pelvic MRI.      - X-ray lumbar spine 6/8/23:  Transitional anatomy with sacralization of L5.  Vertebral body heights maintained.  No spondylolisthesis.  Disc spaces maintained.  Mild facet arthropathy.  No acute osseous abnormality.  Bilateral SI joint degenerative changes.  Constipation suspected.    - X-ray pelvis 6/8/23:  Hip joint spaces maintained.  Likely transitional anatomy with L5 sacralization.  Symmetric degenerative findings of the bilateral SI joints.  Constipation findings noted.    - Pelvis CT w/o contrast 1/6/23:  The visualized gastrointestinal tract appears normal.  Urinary bladder is normal.  Inguinal regions are normal.  No masses, fluid collections or adenopathy seen.  1. There is a cystic area on the left side of the pelvis measuring about 5.8 cm. This has a simple cystic appearance to it. Statistically this most likely represents a ovarian cyst. The patient's uterus has apparently beenremoved.  The bony elements appear intact.  Impression:  1. Cyst on the left side of the pelvis. Statistically most likely  represents an ovarian cyst. Evaluation with pelvic ultrasound is recommended.    - Pelvic US 12/21/22:  Transabdominal and transvaginal pelvic ultrasound was performed by the sonographer. Static images are submitted. Uterus is not visualized consistent with patient's history of previous partial hysterectomy. Right  ovary is not visualized. Patient provides a history of previous right oophorectomy. Left ovary measures 3.4 x 3.9 x 2.9 cm in size. Multiple left ovarian follicles are visualized. Small left ovarian cyst is visualized measuring 1.8 cm in diameter. Left ovarian blood flow is present. Trace amount of pelvic free fluid is present adjacent to the vaginal cuff.  IMPRESSION:  1. Nonvisualization of the uterus and right ovary consistent with patient's surgical history.  2. Small 1.8 cm left ovarian cyst.  3. Trace amount of pelvic free fluid.    - MRI C-spine 9/26/22:  FINDINGS:  Reversal of the normal cervical lordotic curvature. The vertebral body heights and alignment are maintained throughout the cervical spine. No abnormal vertebral body marrow signal. Multilevel intervertebral disc desiccation.  Spinal cord is normal in caliber and demonstrates normal signal. The visualized portions of the cervicomedullary junction are unremarkable.  C2-C3: Normal intervertebral disc contour. No central canal or neural foraminal narrowing.  C3-C4: Intervertebral disc bulge narrows the ventral subarachnoid space. No central canal or neural foraminal narrowing.  C4-C5: Intervertebral disc bulge eccentric to the right side. There is narrowing of the ventral subarachnoid space. The central canal is borderline measuring 10 mm. Moderate right and mild left neural foraminal narrowing.   C5-C6: Intervertebral disc bulge with superimposed left paracentral disc protrusion. There is narrowing of the ventral subarachnoid space and left subarticular recess. Bilateral uncovertebral and facet hypertrophy. Moderate bilateral neural  foraminal narrowing. The central canal is narrowed to 8 mm.  C6-C7: Intervertebral disc bulge narrows the ventral subarachnoid space. The central canal is narrowed to 9 mm. No neural foraminal narrowing.  C7-T1: Normal intervertebral disc contour. No central canal or neural foraminal narrowing.  The paravertebral soft tissues are unremarkable.  IMPRESSION:  Multilevel cervical spondylosis most prominent at C4-7. The findings are not significantly changed when compared to the prior exam.    - MRI Lumbar spine 8/17/21:  FINDINGS:  Alignment: Normal.  Vertebral bodies: Normal height and marrow signal.  T12-L1: No significant spinal canal stenosis or neuroforaminal narrowing.  L1-L2: No significant spinal canal stenosis or neuroforaminal narrowing.  L2-L3: No significant spinal canal stenosis or neuroforaminal narrowing.  L3-L4: No significant spinal canal stenosis or neuroforaminal narrowing.  L4-L5: No significant spinal canal stenosis or neuroforaminal narrowing.  L5-S1: No significant spinal canal stenosis or neuroforaminal narrowing.  Spinal cord: The cord extends down to the L1 level. The cord has a normal size, configuration and signal pattern.    - MRI Thoracic spine w/o 6/15/20:  FINDINGS:  Vertebral bodies: Normal vertebral body height, alignment and marrow signal.  C7-T1: The disc is normal. The central canal and neural foramen appear normal. No displacement or compression of the neural elements are seen.  T1-T2: The disc has a normal contour. The central canal and neural foramen appear normal. No displacement or compression of the neural elements are seen.  T2-T3: The disc has a normal contour. The central canal and neural foramen appear normal. No displacement or compression of the neural elements are seen.  T3-T4: The disc has a normal contour. The central canal and neural foramen appear normal. No displacement or compression of the neural elements are seen.  T4-T5: The disc has a normal contour. The  central canal and neural foramen appear normal. No displacement or compression of the neural elements are seen.  T5-T6: The disc has a normal contour. The central canal and neural foramen appear normal. No displacement or compression of the neural elements are seen.  T6-T7: The disc has a normal contour. The central canal and neural foramen appear normal. No displacement or compression of the neural elements are seen.  T7-T8: The disc has a normal contour. The central canal and neural foramen appear normal. No displacement or compression of the neural elements are seen.  T8-T9: The disc has a normal contour. The central canal and neural foramen appear normal. No displacement or compression of the neural elements are seen.  T9-T10: The disc has a normal contour. The central canal and neural foramen appear normal. No displacement or compression of the neural elements are seen.  T10-T11: The disc has a normal contour. The central canal and neural foramen appear normal. No displacement or compression of the neural elements are seen.  T11-T12: The disc has a normal contour. The central canal and neural foramen appear normal. No displacement or compression of the neural elements are seen.  T12-L1: The disc has a normal contour. The central canal and neural foramen appear normal. No displacement or compression of the neural elements are seen.  Spinal cord: The cord has a normal size, configuration and signal pattern.  Additional findings: None seen.      Labs:  BMP  Lab Results   Component Value Date     08/16/2023    K 5.4 (H) 08/16/2023     08/16/2023    CO2 29 08/16/2023    BUN 22.2 (H) 08/16/2023    CREATININE 0.74 08/16/2023    CALCIUM 10.0 08/16/2023    ANIONGAP 9.0 08/16/2023     Lab Results   Component Value Date    AST 16 08/16/2023     Lab Results   Component Value Date     08/16/2023       Assessment:  Margareth Echeverria is a 44 y.o. female with the following diagnoses based on history, exam,  and imaging:    Problem List Items Addressed This Visit    None  Visit Diagnoses       Closed fracture of nasal bone, sequela    -  Primary    Relevant Orders    Ambulatory referral/consult to Plastic Surgery    Long term (current) use of opiate analgesic        Relevant Medications    HYDROcodone-acetaminophen (NORCO) 5-325 mg per tablet (Start on 5/18/2024)    Other Relevant Orders    Ambulatory referral/consult to Plastic Surgery    Chronic pain syndrome        Relevant Medications    HYDROcodone-acetaminophen (NORCO) 5-325 mg per tablet (Start on 5/18/2024)            This is a pleasant 44 y.o. lady presenting with:     - Neck pain and cervicogenic HA and left radicular arm pain. Appears to have occipital neuralgia on left which improved after occipital nerve blocks. She also has some myofascial pain on exam.   - Chronic bilateral low back pain: Pain appears primarily due to SIJ dysfunction on exam, but may have facet and myofascial contributions as well.    - Back pain improved after bilateral SIJ CSI   - She does note some radiation into the legs in L4/5 distribution and MRI with mild canal stenosis at L4-5 with facet arthropathy  - Chronic pelvic pain: Pain appears multifactorial and she has a complex history with prior hysterectomy for endometriosis, interstitial cystitis, b/l oophorectomy for ovarian cyst, prior bilateral inguinal hernia repair and hypophosphatasia. Unclear exactly what the cause of the pain is at this point as she has multiple areas of pain.    - SIJ CSI did help some of the anterior pelvis pain as well  - Nasal pain and deformity after fracture.   - History of fibromyalgia.   - History of familial hypophosphatasia and h/o polyarthralgia that was   - Comorbidities: Depression. ADHD. Hypophosphatasia. H/o Trigeminal neuralgia. IBS w/ constipation. H/o AV block. Former smoker. Allergy to opioids.     Treatment Plan:   - PT/OT/HEP: Encouraged cont HEP for her SIJ pain and back pain.    -  Discussed benefits of exercise for pain.   - Procedures: Performed bilateral lumbosacral TPIs and left gluteal TPI with lido/bupi (no steroid)  - Can repeat C7-T1 IL ALEKSANDR with local/MAC PRN. She responded very well to this.    - If limited relief with ALEKSANDR, consider left C2-3, C3-4 diagnostic MBBs.   - Consider repeat b/l SIJ CSI if back pain returns/worsens  - Consider L4-5 IL ALEKSANDR for radicular leg pains   - Consider b/l superior hypogastric plexus block for chronic pelvic pain   - Consider ganglion impar and sacrococcygeal injection for coccydynia  - Medications:   - Cont Norco 5/325 mg q 4-6 hrs PRN pain (5 tab/d or #150 per month). This is 25 MME. Attempt to decrease to 4.5/d.   - Goal to be down to 4/d by June refill.   - Cont baclofen 5-10 mg daily PRN. Try to switch one hydrocodone pill for a baclofen tablet   - Cont tizanidine 4-8 mg qHS.    - Cont cymbalta 30 mg daily (didn't tolerate 60 mg)   - Holding gabapentin 300 mg qHS for HA ppx and chronic pain.    - Pain contract signed 08/16/2023    -  reviewed  - Imaging: Reviewed.   - Labs: Reviewed.  Medications are appropriately dosed for current hepatorenal function.  - Consulted rheumatology regarding hypophosphatasia, pending  - Consult facial plastic surgery, Dr. Talavera    Follow Up: RTC 2 months w/ PA or sooner PRN      Katharine Steve M.D.  Interventional Pain Medicine / Physical Medicine & Rehabilitation

## 2024-05-02 NOTE — Clinical Note
Please schedule f/u w/ PA in 2 months. Please fax referral to [facility] in [facility]. Dr. Talavera.

## 2024-05-13 ENCOUNTER — PATIENT MESSAGE (OUTPATIENT)
Dept: PRIMARY CARE CLINIC | Facility: CLINIC | Age: 44
End: 2024-05-13
Payer: COMMERCIAL

## 2024-05-13 LAB — BCS RECOMMENDATION EXT: NORMAL

## 2024-05-14 ENCOUNTER — PATIENT OUTREACH (OUTPATIENT)
Dept: ADMINISTRATIVE | Facility: HOSPITAL | Age: 44
End: 2024-05-14
Payer: COMMERCIAL

## 2024-05-19 DIAGNOSIS — G89.4 CHRONIC PAIN SYNDROME: ICD-10-CM

## 2024-05-19 DIAGNOSIS — Z79.891 LONG TERM (CURRENT) USE OF OPIATE ANALGESIC: ICD-10-CM

## 2024-05-20 RX ORDER — HYDROCODONE BITARTRATE AND ACETAMINOPHEN 5; 325 MG/1; MG/1
1 TABLET ORAL
Qty: 150 TABLET | Refills: 0 | OUTPATIENT
Start: 2024-05-20 | End: 2024-06-19

## 2024-06-15 DIAGNOSIS — M54.81 CERVICO-OCCIPITAL NEURALGIA: ICD-10-CM

## 2024-06-15 DIAGNOSIS — M25.50 POLYARTHRALGIA: ICD-10-CM

## 2024-06-15 DIAGNOSIS — Z79.891 LONG TERM (CURRENT) USE OF OPIATE ANALGESIC: ICD-10-CM

## 2024-06-15 DIAGNOSIS — G89.4 CHRONIC PAIN SYNDROME: ICD-10-CM

## 2024-06-15 DIAGNOSIS — M54.12 CERVICAL RADICULOPATHY: ICD-10-CM

## 2024-06-17 RX ORDER — BACLOFEN 10 MG/1
5-10 TABLET ORAL DAILY PRN
Qty: 30 TABLET | Refills: 2 | OUTPATIENT
Start: 2024-06-17 | End: 2024-09-15

## 2024-06-17 RX ORDER — HYDROCODONE BITARTRATE AND ACETAMINOPHEN 5; 325 MG/1; MG/1
1 TABLET ORAL
Qty: 150 TABLET | Refills: 0 | Status: SHIPPED | OUTPATIENT
Start: 2024-06-19 | End: 2024-07-19

## 2024-06-20 ENCOUNTER — PATIENT MESSAGE (OUTPATIENT)
Dept: PRIMARY CARE CLINIC | Facility: CLINIC | Age: 44
End: 2024-06-20
Payer: COMMERCIAL

## 2024-06-21 ENCOUNTER — PATIENT MESSAGE (OUTPATIENT)
Dept: PRIMARY CARE CLINIC | Facility: CLINIC | Age: 44
End: 2024-06-21

## 2024-06-21 ENCOUNTER — TELEPHONE (OUTPATIENT)
Dept: PRIMARY CARE CLINIC | Facility: CLINIC | Age: 44
End: 2024-06-21

## 2024-06-21 ENCOUNTER — OFFICE VISIT (OUTPATIENT)
Dept: PRIMARY CARE CLINIC | Facility: CLINIC | Age: 44
End: 2024-06-21
Payer: COMMERCIAL

## 2024-06-21 VITALS
BODY MASS INDEX: 20.32 KG/M2 | WEIGHT: 119 LBS | SYSTOLIC BLOOD PRESSURE: 100 MMHG | HEIGHT: 64 IN | RESPIRATION RATE: 15 BRPM | DIASTOLIC BLOOD PRESSURE: 68 MMHG | OXYGEN SATURATION: 95 % | HEART RATE: 74 BPM

## 2024-06-21 DIAGNOSIS — R52 BODY ACHES: ICD-10-CM

## 2024-06-21 DIAGNOSIS — R53.83 FATIGUE, UNSPECIFIED TYPE: Primary | ICD-10-CM

## 2024-06-21 DIAGNOSIS — F41.8 SITUATIONAL ANXIETY: ICD-10-CM

## 2024-06-21 DIAGNOSIS — F90.0 ATTENTION DEFICIT HYPERACTIVITY DISORDER (ADHD), PREDOMINANTLY INATTENTIVE TYPE: ICD-10-CM

## 2024-06-21 LAB
BILIRUB SERPL-MCNC: NORMAL MG/DL
BLOOD, POC UA: NORMAL
CTP QC/QA: YES
GLUCOSE UR QL STRIP: NORMAL
KETONES UR QL STRIP: NORMAL
LEUKOCYTE ESTERASE URINE, POC: NORMAL
NITRITE, POC UA: NORMAL
PH, POC UA: 7
PROTEIN, POC: NORMAL
SARS-COV-2 AG RESP QL IA.RAPID: NEGATIVE
SPECIFIC GRAVITY, POC UA: 1.02
T4 FREE SP9 P CHAL SERPL-SCNC: 0.77 NG/DL (ref 0.76–1.46)
TSH SERPL DL<=0.005 MIU/L-ACNC: 1 UIU/ML (ref 0.36–3.74)
UROBILINOGEN, POC UA: 0.2

## 2024-06-21 RX ORDER — LISDEXAMFETAMINE DIMESYLATE CAPSULES 20 MG/1
20 CAPSULE ORAL EVERY MORNING
Qty: 30 CAPSULE | Refills: 0 | Status: SHIPPED | OUTPATIENT
Start: 2024-06-21 | End: 2024-06-21 | Stop reason: SDUPTHER

## 2024-06-21 RX ORDER — MONTELUKAST SODIUM 10 MG/1
TABLET ORAL
COMMUNITY
Start: 2024-06-06

## 2024-06-21 RX ORDER — LISDEXAMFETAMINE DIMESYLATE CAPSULES 20 MG/1
20 CAPSULE ORAL EVERY MORNING
Qty: 30 CAPSULE | Refills: 0 | OUTPATIENT
Start: 2024-06-21

## 2024-06-21 RX ORDER — LISDEXAMFETAMINE DIMESYLATE CAPSULES 20 MG/1
20 CAPSULE ORAL EVERY MORNING
Qty: 30 CAPSULE | Refills: 0 | Status: SHIPPED | OUTPATIENT
Start: 2024-06-21

## 2024-06-21 RX ORDER — DULOXETIN HYDROCHLORIDE 30 MG/1
30 CAPSULE, DELAYED RELEASE ORAL DAILY
Qty: 90 CAPSULE | Refills: 1 | Status: SHIPPED | OUTPATIENT
Start: 2024-06-21 | End: 2024-12-18

## 2024-06-21 NOTE — PROGRESS NOTES
Subjective:      Patient ID: Margareth Echeverria is a 44 y.o. female.    Chief Complaint: Fatigue (PT HAD BREAST IMPLANTS ON MAY 17TH, HAD TO GO BACK TO HAVE R. BREAST FLUSHED, PT STATES SHE WAS TOLD THERE WAS NO INFECTION, BUT THE SURGEON TREATED IT LIKE ONE. /PT REPORTS NOT FEELING HERSELF SINCE SX - FEELING FATIGUED AND HAVING HEADACHES )      HPI  Patient is here today with new complaints and follow-up.    Patient had breast augmentation surgery 1 month ago.  Patient had breast implants on May 17, 2024.  She reports that 1 week later she had a surgery to flush out the implant.  She has surgical follow-up scheduled for next week.    Patient is concerned that since her surgery she is felt fatigued daily.  She reports night sweats.  States I do not feel like myself.  Had labs done recently with Dr. Sheridan, CBC and CMP overall within normal limits.    She denies any fever, chills, nausea or vomiting.    ADHD follow up.   On current medication with good control.   Requesting refill on current dose.  Denies any side effects or other complaints.  No CP, no palpitations, no SOB, no dizziness, no confusion, no appetite change.     Anxiety-Doing well on Cymbalta, needs refill today  Review of Systems   Constitutional:  Positive for fatigue. Negative for activity change, appetite change, chills, diaphoresis, fever and unexpected weight change.   HENT:  Negative for nasal congestion, facial swelling and rhinorrhea.    Eyes:  Negative for pain and visual disturbance.   Respiratory:  Negative for cough, chest tightness, shortness of breath and wheezing.    Cardiovascular:  Negative for chest pain, palpitations, leg swelling and claudication.   Gastrointestinal:  Negative for abdominal distention, abdominal pain, anal bleeding, blood in stool, constipation, diarrhea, nausea, vomiting and reflux.   Endocrine: Negative for polydipsia and polyuria.   Genitourinary:  Negative for difficulty urinating, flank pain and frequency.    Musculoskeletal:  Negative for arthralgias and back pain.   Neurological:  Positive for headaches. Negative for dizziness, vertigo, tremors, syncope, weakness, light-headedness and numbness.   Psychiatric/Behavioral:  Negative for agitation, behavioral problems, confusion, decreased concentration, dysphoric mood, hallucinations, self-injury, sleep disturbance and suicidal ideas. The patient is not nervous/anxious and is not hyperactive.        Medication List with Changes/Refills   Current Medications    BACLOFEN (LIORESAL) 10 MG TABLET    Take 0.5-1 tablets (5-10 mg total) by mouth daily as needed (pain or spasm).    COMPOUND HORMONE REPLACEMENT    Take by mouth once daily. Inject 1 pellet Q 3-4 months to hip    ETODOLAC (LODINE) 300 MG CAP    TAKE 1 CAPSULE (300 MG TOTAL) BY MOUTH 2 (TWO) TIMES DAILY AS NEEDED (PAIN AND INFLAMMATION).    HYDROCODONE-ACETAMINOPHEN (NORCO) 5-325 MG PER TABLET    Take 1 tablet by mouth every 4 to 6 hours as needed for Pain.    MONTELUKAST (SINGULAIR) 10 MG TABLET    TAKE 1 TABLET BY MOUTH EVERY NIGHT AT BEDTIME TO PREVENT CONTRACTURE    ONDANSETRON (ZOFRAN) 4 MG TABLET    Take 1 tablet (4 mg total) by mouth every 8 (eight) hours as needed for Nausea.    TIZANIDINE (ZANAFLEX) 4 MG TABLET    Take 1-2 tablets (4-8 mg total) by mouth nightly as needed (pain and sleep).   Changed and/or Refilled Medications    Modified Medication Previous Medication    DULOXETINE (CYMBALTA) 30 MG CAPSULE DULoxetine (CYMBALTA) 30 MG capsule       Take 1 capsule (30 mg total) by mouth once daily.    Take 30 mg by mouth.    LISDEXAMFETAMINE (VYVANSE) 20 MG CAPSULE lisdexamfetamine (VYVANSE) 20 MG capsule       Take 1 capsule (20 mg total) by mouth every morning.    Take 1 capsule (20 mg total) by mouth every morning.   Discontinued Medications    GABAPENTIN (NEURONTIN) 300 MG CAPSULE    Take 1 capsule (300 mg total) by mouth every evening.    LINZESS 72 MCG CAP CAPSULE    Take 72 mcg by mouth before  "breakfast.    NORTRIPTYLINE (PAMELOR) 10 MG CAPSULE    Take 1 capsule (10 mg total) by mouth every evening.        Objective:     Vitals:    06/21/24 0943   BP: 100/68   Pulse: 74   Resp: 15   SpO2: 95%   Weight: 54 kg (119 lb)   Height: 5' 4" (1.626 m)        Physical Exam  Constitutional:       Appearance: Normal appearance. She is normal weight.   HENT:      Head: Normocephalic and atraumatic.      Nose: Nose normal.   Eyes:      Conjunctiva/sclera: Conjunctivae normal.      Comments: Eyes tracking normal on exam    Cardiovascular:      Rate and Rhythm: Normal rate and regular rhythm.      Pulses: Normal pulses.      Heart sounds: Normal heart sounds. No murmur heard.     No friction rub. No gallop.   Pulmonary:      Effort: Pulmonary effort is normal. No respiratory distress.      Breath sounds: Normal breath sounds. No stridor. No wheezing, rhonchi or rales.   Abdominal:      General: Abdomen is flat. Bowel sounds are normal.      Palpations: Abdomen is soft.   Musculoskeletal:      Right lower leg: No edema.      Left lower leg: No edema.      Comments: Moves all extremities well and with good control  Normal gait observed      Skin:     General: Skin is warm and dry.      Capillary Refill: Capillary refill takes less than 2 seconds.   Neurological:      Mental Status: She is alert and oriented to person, place, and time.   Psychiatric:         Mood and Affect: Mood normal.         Behavior: Behavior normal.         Thought Content: Thought content normal.         Judgment: Judgment normal.            Assessment & Plan:     Fatigue, unspecified type  -     SARS Coronavirus 2 Antigen, POCT Manual Read  -     T4, Free; Future; Expected date: 06/21/2024  -     TSH; Future; Expected date: 06/21/2024  -     POCT URINALYSIS    Body aches  -     SARS Coronavirus 2 Antigen, POCT Manual Read  -     T4, Free; Future; Expected date: 06/21/2024  -     TSH; Future; Expected date: 06/21/2024  -     POCT " URINALYSIS    Attention deficit hyperactivity disorder (ADHD), predominantly inattentive type  -     lisdexamfetamine (VYVANSE) 20 MG capsule; Take 1 capsule (20 mg total) by mouth every morning.  Dispense: 30 capsule; Refill: 0    Situational anxiety  -     DULoxetine (CYMBALTA) 30 MG capsule; Take 1 capsule (30 mg total) by mouth once daily.  Dispense: 90 capsule; Refill: 1           Negative UA, CBC, CMP, COVID, thyroid function tests were within normal limits.  I discussed with the patient that this could be normal course of recovery from her recent surgery.  I encouraged her to follow-up with her surgeon Tuesday for further evaluation and consult.  Patient verbalized understanding and agrees with plan of care.      -Patient instructed that our office calls back on all labs and imaging within 1 week of receiving the results. Patient instructed to reach out to our office if they have not heard from us so that we can request the results from the lab/imaging center           Carly Messer PA-C

## 2024-07-08 ENCOUNTER — PATIENT MESSAGE (OUTPATIENT)
Dept: PRIMARY CARE CLINIC | Facility: CLINIC | Age: 44
End: 2024-07-08
Payer: COMMERCIAL

## 2024-07-11 DIAGNOSIS — F90.0 ATTENTION DEFICIT HYPERACTIVITY DISORDER (ADHD), PREDOMINANTLY INATTENTIVE TYPE: ICD-10-CM

## 2024-07-11 RX ORDER — LISDEXAMFETAMINE DIMESYLATE CAPSULES 20 MG/1
20 CAPSULE ORAL EVERY MORNING
Qty: 30 CAPSULE | Refills: 0 | Status: SHIPPED | OUTPATIENT
Start: 2024-07-11

## 2024-07-16 ENCOUNTER — OFFICE VISIT (OUTPATIENT)
Dept: PAIN MEDICINE | Facility: CLINIC | Age: 44
End: 2024-07-16
Payer: COMMERCIAL

## 2024-07-16 VITALS
OXYGEN SATURATION: 99 % | WEIGHT: 114.88 LBS | DIASTOLIC BLOOD PRESSURE: 77 MMHG | SYSTOLIC BLOOD PRESSURE: 117 MMHG | HEIGHT: 64 IN | BODY MASS INDEX: 19.61 KG/M2 | HEART RATE: 70 BPM

## 2024-07-16 DIAGNOSIS — G89.4 CHRONIC PAIN SYNDROME: ICD-10-CM

## 2024-07-16 DIAGNOSIS — Z79.891 LONG TERM (CURRENT) USE OF OPIATE ANALGESIC: ICD-10-CM

## 2024-07-16 PROCEDURE — 3008F BODY MASS INDEX DOCD: CPT | Mod: CPTII,S$GLB,, | Performed by: PHYSICAL MEDICINE & REHABILITATION

## 2024-07-16 PROCEDURE — 1159F MED LIST DOCD IN RCRD: CPT | Mod: CPTII,S$GLB,, | Performed by: PHYSICAL MEDICINE & REHABILITATION

## 2024-07-16 PROCEDURE — 3074F SYST BP LT 130 MM HG: CPT | Mod: CPTII,S$GLB,, | Performed by: PHYSICAL MEDICINE & REHABILITATION

## 2024-07-16 PROCEDURE — 3078F DIAST BP <80 MM HG: CPT | Mod: CPTII,S$GLB,, | Performed by: PHYSICAL MEDICINE & REHABILITATION

## 2024-07-16 PROCEDURE — 99215 OFFICE O/P EST HI 40 MIN: CPT | Mod: S$GLB,,, | Performed by: PHYSICAL MEDICINE & REHABILITATION

## 2024-07-16 PROCEDURE — 1160F RVW MEDS BY RX/DR IN RCRD: CPT | Mod: CPTII,S$GLB,, | Performed by: PHYSICAL MEDICINE & REHABILITATION

## 2024-07-16 RX ORDER — HYDROCODONE BITARTRATE AND ACETAMINOPHEN 5; 325 MG/1; MG/1
1 TABLET ORAL
Qty: 150 TABLET | Refills: 0 | Status: SHIPPED | OUTPATIENT
Start: 2024-07-16 | End: 2024-08-15

## 2024-07-16 RX ORDER — CYCLOBENZAPRINE HCL 10 MG
TABLET ORAL
COMMUNITY
Start: 2024-04-29

## 2024-08-13 ENCOUNTER — PATIENT MESSAGE (OUTPATIENT)
Dept: PAIN MEDICINE | Facility: CLINIC | Age: 44
End: 2024-08-13
Payer: COMMERCIAL

## 2024-08-13 DIAGNOSIS — M25.50 POLYARTHRALGIA: ICD-10-CM

## 2024-08-13 DIAGNOSIS — M54.12 CERVICAL RADICULOPATHY: ICD-10-CM

## 2024-08-13 DIAGNOSIS — Z79.891 LONG TERM (CURRENT) USE OF OPIATE ANALGESIC: ICD-10-CM

## 2024-08-13 DIAGNOSIS — M54.81 CERVICO-OCCIPITAL NEURALGIA: ICD-10-CM

## 2024-08-13 DIAGNOSIS — F90.0 ATTENTION DEFICIT HYPERACTIVITY DISORDER (ADHD), PREDOMINANTLY INATTENTIVE TYPE: ICD-10-CM

## 2024-08-13 DIAGNOSIS — G89.4 CHRONIC PAIN SYNDROME: ICD-10-CM

## 2024-08-13 RX ORDER — BACLOFEN 10 MG/1
5-10 TABLET ORAL DAILY PRN
Qty: 30 TABLET | Refills: 2 | Status: SHIPPED | OUTPATIENT
Start: 2024-08-13 | End: 2024-11-11

## 2024-08-13 RX ORDER — LISDEXAMFETAMINE DIMESYLATE 20 MG/1
20 CAPSULE ORAL EVERY MORNING
Qty: 30 CAPSULE | Refills: 0 | Status: SHIPPED | OUTPATIENT
Start: 2024-08-13

## 2024-08-15 ENCOUNTER — OFFICE VISIT (OUTPATIENT)
Dept: PAIN MEDICINE | Facility: CLINIC | Age: 44
End: 2024-08-15
Payer: COMMERCIAL

## 2024-08-15 DIAGNOSIS — M54.81 CERVICO-OCCIPITAL NEURALGIA: ICD-10-CM

## 2024-08-15 DIAGNOSIS — Z79.891 LONG TERM (CURRENT) USE OF OPIATE ANALGESIC: ICD-10-CM

## 2024-08-15 DIAGNOSIS — G89.29 CHRONIC NECK PAIN: ICD-10-CM

## 2024-08-15 DIAGNOSIS — M54.2 CHRONIC NECK PAIN: ICD-10-CM

## 2024-08-15 DIAGNOSIS — G89.4 CHRONIC PAIN SYNDROME: ICD-10-CM

## 2024-08-15 DIAGNOSIS — M54.12 CERVICAL RADICULOPATHY: Primary | ICD-10-CM

## 2024-08-15 RX ORDER — HYDROCODONE BITARTRATE AND ACETAMINOPHEN 5; 325 MG/1; MG/1
1 TABLET ORAL
Qty: 150 TABLET | Refills: 0 | Status: SHIPPED | OUTPATIENT
Start: 2024-08-15 | End: 2024-09-14

## 2024-08-15 NOTE — PATIENT INSTRUCTIONS
Pre-Procedural Instructions  Interventional Pain  Epidural Steroid Injection    Purpose:  We are performing a procedure in which imaging guidance is being utilized to place a needle in a particular location to allow injection of medications into the epidural space in the spine to relieve pain.   It is important to continue therapeutic exercise with physical therapy or a home exercise program as part of your treatment to maintain range of motion and strength of the joint.         Informed Consent:  These procedures are safe when performed by well-trained physicians, but like any interventional procedure, it does carry rare risks which include:  Spinal Cord or nerve injury which may result in weakness, numbness, difficulty breathing, changes in bowel or bladder function  Infection, abscess   Bleeding, hematoma  Dural puncture  Stroke or heart attack  Seizure  Allergic Reactions  Vasovagal reactions  Arachnoiditis  Other potential complications:  No relief or worsening pain  Headache   Steroids are utilized and can result in temporary:  Facial flushing, headache, insomnia/restlessness  Increased blood sugar  Increased blood pressure  Procedural discomfort: This typically improves with ice to the area in 20 minute intervals in the first 1-2 days after the procedure.     Preparing for the procedure:    Tell your healthcare provider about all medicines you take. This includes over-the-counter medicines, herbs, vitamins, and other supplements.  Tell your healthcare provider about any other medical or dental procedures planned in proximity to your procedure.   Reduce Infection Risk:   Notify clinic if you are:  Having signs of illness, such as fevers, or signs of a urinary tract infection (UTI)  Prescribed antibiotics for an infection  Reduce bleeding risk:  For 7 days prior to procedure and during the duration of the trial (until your clinic follow-up):  Stop NSAIDs (ibuprofen/Advil, naproxen/Aleve, Meloxicam/Mobic,  Goody's, or others)  Stop Demetria [place], Pepto Bismol, & Herbal Medication/Supplements (such as Ginko, high dose Vitamin E, Fish Oil, Turmeric, Garlic, and others)  Home Support:  You MUST have a responsible  to bring you home from the facility and assist you at home on the day of the procedure   Plan to not be at work on the day of the procedure.   Medications:  Blood thinners: Such as: Aspirin, Plavix, Warfarin (Coumadin), Eliquis, Pradaxa, Xarelto, & others  If you are taking blood thinning medications, the clinic will notify you if you need to hold and of the number days to hold the medication prior to the procedure and when to restart these after the procedure. This will be communicated with and approved by your prescribing physician.   Please notify the office if you are taking blood thinning medications and are unsure if or when you need to hold the medication.   GLP-1 agonists: Semaglutide (Ozempic, Wegovy, Rybelsus), Tirzepatide (Mounjaro, Zepbound), Dulaglutide (Trulicity), Liraglutide, Lixisenatide, Exenatide  These medications can increase the risk of regurgitation and pulmonary aspiration of gastric contents during general anesthesia and deep sedation  If you take this weekly, please hold for 1 week prior to the procedure  If you take this daily, please hold on the day of procedure  If these are utilized for blood sugar control, you will need to discuss with your managing provider on what to utilize for bridging the antidiabetic therapy to maintain blood sugar control and avoiding hyperglycemia  Please take other regular medications as scheduled.  Diet:  If you WILL be receiving sedation for the procedure.  Do NOT eat anything for 8 hours prior to your procedure.    You may drink clear liquids up to 4 hours prior to your procedure.   Clear liquids include: water, black coffee, fruit juice without pulp, clear tea  You may drink water up to 2 hours prior to your procedure  You may use a sip of water  to take your regular medications  If you are NOT receiving sedation for the procedure:  Do not eat or drink anything for 2 hours prior to your procedure. You may eat a light meal more than 2 hours prior to your procedure.   For Diabetic Patients:  Please discuss with your managing physician the best way to take your medications on the procedure day to minimize large increases or decreases in your blood sugar  Please notify clinic of your most recent A1c and when that was performed. Optimizing your blood sugar control prior to the procedure will help decrease your risk of complications, such as infection.   Notify clinic and we will attempt to schedule your procedure as early in the morning as possible to minimize hypoglycemia that may occur while fasting for the procedure.  Please inform clinic if: Dr. Katharine Steve's clinic phone number is [phone]  You are pregnant or attempting to become pregnant  You have an implanted electronic device (pacemaker, defibrillator, medication pump, stimulator, etc.)  You are having signs of infection noted above or are started on antibiotics.  You are allergic to iodinated contrast agents, local anesthetics, or steroids.  You are having other medical procedures around the time of your procedure (within 2 weeks)    Instructions for procedure day:    We ask that you please leave your valuables at home  Avoid moisturizers or scented personal products  Wear loose fitting clothing that you can easily change in & out of  Plan to not be at work on the day of the procedure.   Please remove jewelry.  If you are having a procedure done on your head or neck, please remove any metallic items or hardware, such as dental hardware, jewelry that may obscure the images of the area during the procedure.     After the procedure: You will be sent to a recovery room after the procedure. You will go home the same day.     Home Care Instructions:    Injection site(s)  The injection sites may be  covered with a dressing.  You may remove bandage at discharge from the hospital.   Bruising may be present  Avoid soaking or bathing in hot tub, bath or pool on the day of your procedure. Okay to submerge site day after procedure.   Showering is okay the day of procedure.   Avoid heat to the area  Notify the clinic if you are experiencing increased bleeding or drainage from the injection site(s)  Post-procedure pain:  Mild-moderate pain/discomfort may occur  Pain may be relieved with:  Ice pack or bag of frozen peas (or something similar) wrapped in a thin towel to reduce the swelling & pain around incision. Place ice to area for 20 minutes, then remove for 20 minutes and repeat as needed.   OTC Tylenol (Acetaminophen)  Prescription pain medication if needed  Procedural pain typically improves after approximately 1-3 days  Activity/Diet:  Day of the procedure:  Do NOT drive or operate heavy machinery  Avoid strenuous activity, heavy lifting, bending or twisting.   Åvoid activity that requires skill, dexterity or coordination  Avoid independent activities, and have assistance available until normal sensation has returned  If you received sedation - For 24 hrs following the procedure DO NOT:   Drive or operate heavy machinery  Drink alcohol  Make any important decisions  Day after the procedure: Resume daily life activities as tolerated:  Let pain be your guide. If possible, avoid activities that exacerbate your pain  Progress slowly and try not to over exert yourself if you are feeling good  Bed rest is NOT recommended   Physical Therapy (PT): You may restart your home exercise program the day following your procedure.  Diet: You may resume your normal diet as tolerated after the procedure  If nauseated, hold solid foods until nausea has resolved  Medications:  If you held any blood thinner medications for the procedure, you should have been given a specific timeline on when to restart the medication that has been  determined in collaboration with your prescriber and our clinic. If you do not know when to restart, please notify clinic.  You may continue all other regular medications as prescribed.     When to call your healthcare provider [phone]  Call your healthcare provider if you have any of these symptoms:  Fever of 100.4°F (38.0°C) or higher. If you feel hot, take your temperature before notifying clinic.  New pain, weakness, tingling, or numbness in your legs. This may be expected in the first several hours after the procedure due to the local anesthetic used during the procedure.   New or worsening back pain   Redness, drainage or bleeding from site  Changes in bowel (incontinence) function  Changes in bladder (incontinence or retention) function  New or unusual headache &/or neck stiffness  Persistent nausea or vomiting with inability to tolerate clear liquids for more than 12 hours       When to seek medical attention  Call 911 right away if you have any of the following:  Chest pain  Shortness of breath  Signs of a stroke: Sudden changes in vision, changes in speech, sudden weakness in your face/arms/legs  Any other medical emergency     Follow-up: Post-op clinic appointment is typically 2-4 weeks after procedure.      Dr. Katharine Steve M.D.  [facility] Interventional Pain Medicine  Phone: [phone]

## 2024-08-15 NOTE — PROGRESS NOTES
Pain Medicine  Telemedicine Virtual Visit    The patient location is: [place]  The chief complaint leading to consultation is: Neck pain  Visit type: Virtual visit with synchronous audio and video  Total time spent with patient: 10 mins  Each patient to whom he or she provides medical services by telemedicine is:  (1) informed of the relationship between the physician and patient and the respective role of any other health care provider with respect to management of the patient; and (2) notified that he or she may decline to receive medical services by telemedicine and may withdraw from such care at any time.      Chief Complaint:   Chief Complaint   Patient presents with    Neck Pain       History of Present Illness: Margareth Echeverria is a 44 y.o. female referred by Dr. Nelda Thornton for Pelvic pain.      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 and she has a history of chronic pelvic pain and LBP with prior hysterectomy and b/l oophorectomy and she was feeling pretty good in regards to her chronic pelvic pain until Mother's day where she developed rather sudden increase in her pelvic pain with no clear inciting event  Location: involves her entire pelvis, but seems to be primarily localized over her sacrum area  Radiation: She feels some radiation into her anterior thighs  Timing: constant  Quality: Aching, Throbbing, Pounding, Deep, Stabbing, and nagging and swelling  Exacerbating Factors: Bowel movements, valsalva, intercourse, sitting, standing prolonged  Alleviating Factors: nothing  Associated Symptoms: She has neuropathy in her legs/feet from prior to this incident. She feels weakness in her legs. She has chronic constipation, but has been having daily BMs. She does note recent weight loss (states about 30 lbs). She does note night sweats. She denies fevers, urinary incontinence/change in function and bowel incontinence/change in  function    Nothing seems to be helping, she has tried heating, cold. She saw gynecology and was sent here and to pelvic floor rehab.     She denies aggravation with urination.     Severity: Currently: 8/10   Typical Range: 5-10/10     Exacerbation: 10/10     P = 8  E = 10  G = 10  Baseline PEG Score = 9.33    Interval History (08/16/2023):    She is mostly having HA pain over the left side of her scalp. Pain radiates from the occiput to her parietal region and is constant and has been present for days. Stress seems to be aggravating it. She states she has occipital neuralgia and typically gets injections of her occipital nerves which helps her HA. Pain has been worsening because she has been under a lot of stress. Her current physician managing her pain medicine, Dr. Anaya, has discharged her from clinic for no reason due to her having a procedure (SIJ CSI) with this clinic which helped her. She did nothing to breach her pain contract, but she has been having more difficulty with sleeping and worsening pain with all the stress he has been putting her through. She is prescribed hydrocodone/APAP 10/325 mg QID PRN and states she was not needing to take it as often until he discharged her out of the blue, just because she had her procedure done with this clinic instead of with the interventional pain physicians at the clinic that Dr. Anaya works.     Interval History (10/13/2023):  Neck pain: She is mostly having the pain in the left cervico-occipital region and this radiates into the left trapezius and down the left arm with numbness and tingling into the left hand. She denies any clear changes in strength recently. She denies changes in b/b function.     Interval History (07/16/2024):  Margareth Echeverria returns today for follow up.  At the last clinic visit, cont Norco 5/325 mg q 4-6 hrs PRN pain (5 tab/d or #150 per month), cont baclofen 5-10 mg daily PRN. Try to switch one hydrocodone pill for a baclofen tablet,  cont tizanidine 4-8 mg qHS, cont cymbalta 30 mg daily (didn't tolerate 60 mg) and was holding gabapentin 300 mg qHS for HA ppx and chronic pain.     Currently, the neck and back pain are stable.  She had a breast augmentation with a pectoralis muscle repair. She is having some persistent pain that is worst at night on the left side around mid-clavicular area around level of xiphoid/T6-7 distribution    Patient is currently taking Tizanidine, Baclofen, Cymbalta, and Norco which provides 75% relief.  She denies side effects.    Current Pain Scales:  Current: 5/10              Typical Range: 4-7/10     Interval History (08/15/2024):  Margareth Echeverria returns today for follow up.  At the last clinic visit, cont current med regimen.    Currently, the neck pain and HAs are getting worse since last week. Primarily in the left upper cervical, suboccipital region with radiation over the back of the head and into both shoulders and down the left arm into the thumb, index and middle fingers.  She has tried her HEP. She is not sleeping well and having trouble exercising with it.     She continues the hydrocodone 5/325 mg and not ready to taper yet but has been able to do 4.5 tablets on several days. She cont the tizanidine and baclofen which are helping.       Current Pain Scales:  Current: 8/10              Typical Range: 7-9/10      Current PEG Score: 8      Opioid Risk Score         Value Time User    Opioid Risk Score  7 6/8/2023 10:19 AM Katharine Steve MD           Previous Interventions:  - 1/8/24: C7-T1 IL ALEKSANDR w/ 80% relief for > 6 months  - 8/16/23: left DANY and SHANELLE block in clinic with US guidance w/ 4 mg/mL dexamethasone and lido 1% which provided nearly complete relief.   - 6/23/23: B/L SIJ CSI w/ > 50% relief in pain and improved fxn.   - Procedures for neck and back pain, but not for pelvic pain    Previous Therapies:  PT/OT: yes for back pain but not pelvic floor  Relevant Surgery: yes    - surgery for  trigeminal neuralgia  Previous Medications:   - NSAIDS:   - Muscle Relaxants: Zanaflex 2mg   - TCAs:   - SNRIs: cymbalta  - Topicals: CBD  - Anticonvulsants:  gabapentin made her feel drunk  - Opioids: hydrocodone    Current Pain Medications:  Cymbalta 30 mg  Norco 5/325mg   Tizanidine 2 mg   Gabapentin 300 mg   Baclofen 5-10    Blood Thinners: No    Full Medication List:    Current Outpatient Medications:     baclofen (LIORESAL) 10 MG tablet, Take 0.5-1 tablets (5-10 mg total) by mouth daily as needed (pain or spasm)., Disp: 30 tablet, Rfl: 2    COMPOUND HORMONE REPLACEMENT, Take by mouth once daily. Inject 1 pellet Q 3-4 months to hip, Disp: , Rfl:     cyclobenzaprine (FLEXERIL) 10 MG tablet, TAKE 1 TABLET BY MOUTH EVERY NIGHT AT BEDTIME TO PREVENT MUSCLE SPASMS, Disp: , Rfl:     DULoxetine (CYMBALTA) 30 MG capsule, Take 1 capsule (30 mg total) by mouth once daily., Disp: 90 capsule, Rfl: 1    etodolac (LODINE) 300 MG Cap, TAKE 1 CAPSULE (300 MG TOTAL) BY MOUTH 2 (TWO) TIMES DAILY AS NEEDED (PAIN AND INFLAMMATION)., Disp: 60 capsule, Rfl: 0    HYDROcodone-acetaminophen (NORCO) 5-325 mg per tablet, Take 1 tablet by mouth every 4 to 6 hours as needed for Pain., Disp: 150 tablet, Rfl: 0    lisdexamfetamine (VYVANSE) 20 MG capsule, Take 1 capsule (20 mg total) by mouth every morning., Disp: 30 capsule, Rfl: 0    ondansetron (ZOFRAN) 4 MG tablet, Take 1 tablet (4 mg total) by mouth every 8 (eight) hours as needed for Nausea., Disp: 30 tablet, Rfl: 0    tirzepatide 2.5 mg/0.5 mL PnIj, Inject 2.5 mg into the skin every 7 days., Disp: , Rfl:     tiZANidine (ZANAFLEX) 4 MG tablet, Take 1-2 tablets (4-8 mg total) by mouth nightly as needed (pain and sleep)., Disp: 60 tablet, Rfl: 5     Review of Systems:  ROS    Allergies:  Opioids - morphine analogues, Morphine, and Tramadol     Medical History:   has a past medical history of ADHD (attention deficit hyperactivity disorder), Adult hypophosphatasia, AV block, Fibromyalgia,  IBS (irritable bowel syndrome), IC (interstitial cystitis), Occipital neuralgia, and Trigeminal neuralgia.    Surgical History:   has a past surgical history that includes Inguinal hernia repair (Bilateral); Laparoscopic cholecystectomy; Augmentation of breast; Fulguration of endometriosis (07/05/2016); Laparoscopy-assisted vaginal hysterectomy (11/11/2019); Laparoscopy (12/08/2019); Appendectomy; Cystoscopy with hydrodistension and biopsy of bladder (12/28/2020); Removal of breast implant (04/2021); Hysterectomy; and Breast surgery (Bilateral).    Family History:  family history includes Breast cancer in her maternal cousin; Lung cancer in her paternal grandmother; No Known Problems in her father and mother.    Social History:   reports that she quit smoking about 12 years ago. Her smoking use included cigarettes. She started smoking about 22 years ago. She has a 5 pack-year smoking history. She has never been exposed to tobacco smoke. She has never used smokeless tobacco. She reports current alcohol use. She reports current drug use. Drug: Marijuana.    Physical Exam:  There were no vitals taken for this visit.  GEN: No acute distress. Calm, comfortable  HENT: Normocephalic, atraumatic, moist mucous membranes  EYE: Anicteric sclera, non-injected.   CV: Non-diaphoretic. Regular Rate. Radial Pulses 2+.  RESP: Breathing comfortably. Chest expansion symmetric.  EXT: No clubbing, cyanosis.   SKIN: Warm, & dry to palpation. No visible rashes or lesions of exposed skin.   PSYCH: Pleasant mood and appropriate affect. Recent and remote memory intact.   GAIT: Independent, normal ambulation  Neck Exam:       Inspection: No erythema, bruising.       Palpation: (+) TTP of left cervical paraspinals and over SHANELLE and DANY      ROM: No significant Limitation in flexion, extension, lateral bending or rotation      Provocative Maneuvers:  (-) Spurling's bilaterally  Lumbar Spine Exam:       Inspection: No bruising, swelling. Slight  erythema noted at bilateral injection sites.        Palpation: No calor to the area. (+) TTP of b/l SIJ > lumbar paraspinals b/l. (+) TTP of sacrum and coccyx         ROM:  Limited in flexion, extension, lateral bending.       (+) Facet loading bilaterally      (-) Straight Leg Raise bilaterally      (+) ISABELLA bilaterally      (+) SIJ Compression Test bilaterally      (+) Gaenslan's Test bilaterally      (+) Thigh thrust/Posterior Pelvic Pain Provocation Test bilaterally      (+) Sacral thrust  Hip/Pelvis Exam:      Inspection: No gross deformity or apparent leg length discrepancy      Palpation: (+) TTP along inguinal ligaments b/l, pubic symphysis. (+) TTP right ischial bursa and b/l piriformis and gluteal muscles, (-) TTP to bilateral greater trochanteric bursas.       ROM:  No limitation or pain in internal rotation, external rotation b/l  Neurologic Exam:     Alert. Speech is fluent and appropriate.     Strength: 4/5 in left AbDM, wrist extension, 4/5 diffusely in the left leg, otherwise 5/5 throughout bilateral upper and lower extremities     Sensation:  Grossly intact to light touch in bilateral upper and lower extremities     Reflexes: 2+ in b/l BR, biceps, triceps, patella, achilles     Tone: No abnormality appreciated in bilateral lower extremities     No Clonus     Downgoing toes on plantar stimulation     (-) Franco bilaterally          No idea what I because      Imaging:  - MRI C-spine 11/21/23:      - X-Ray Cervical Spine AP & Lateral 10/13/23     FINDINGS:  There is some reversal of the normal cervical lordosis.  Vertebral body heights are within normal limits.  No definite spondylolisthesis demonstrated.  Mild-to-moderate disc height loss noted at C5-6.  Mild disc height loss at C4-5. Posterior elements appear intact without acute fractures or subluxations demonstrated.  Odontoid process appears intact.  Atlantoaxial articulations appear normal.  Prevertebral soft tissues are within normal limits.   Postoperative changes are noted involving the skull on the left in the suboccipital region region.     Impression:     Degenerative findings as above    - MRI L-spine 6/13/23:  Vertebral body alignment is preserved. Vertebral body heights are maintained. Bone marrow signal is within normal limits. The conus medullaris terminates normally at the level of L2. Lumbar nerve roots have normal appearance. Preserved intervertebral disc space height and signal.    T12-L1: No significant canal or foraminal stenosis.    L1-L2: No significant canal or foraminal stenosis.    L2-L3: No significant canal or foraminal stenosis.    L3-L4: No significant canal or foraminal stenosis.    L4-L5: Broad-based disc bulge, ligamentum flavum thickening, facet hypertrophy with at most mild canal stenosis. Mild effacement of the lateral recesses. No significant foraminal stenosis.    L5-S1: No significant canal or foraminal stenosis.        - MRI Pelvis w.o 6/13/23:  The femoral heads are well formed and seated within well formed acetabula. Bone marrow signal is normal. The sacroiliac joints are intact. There is no muscle atrophy or edema. There is no intrapelvic visceral abnormalities. Prior hysterectomy. There is no evidence of fluid collection. There is mild right hamstring origin tendinosis. The gluteus medius and minimus tendon insertions are intact. The hip adductors are intact. There is no evidence of iliopsoas or trochanteric bursitis.    Impression:    Mild right hamstring origin tendinosis. Otherwise, negative pelvic MRI.      - X-ray lumbar spine 6/8/23:  Transitional anatomy with sacralization of L5.  Vertebral body heights maintained.  No spondylolisthesis.  Disc spaces maintained.  Mild facet arthropathy.  No acute osseous abnormality.  Bilateral SI joint degenerative changes.  Constipation suspected.    - X-ray pelvis 6/8/23:  Hip joint spaces maintained.  Likely transitional anatomy with L5 sacralization.  Symmetric  degenerative findings of the bilateral SI joints.  Constipation findings noted.    - Pelvis CT w/o contrast 1/6/23:  The visualized gastrointestinal tract appears normal.  Urinary bladder is normal.  Inguinal regions are normal.  No masses, fluid collections or adenopathy seen.  1. There is a cystic area on the left side of the pelvis measuring about 5.8 cm. This has a simple cystic appearance to it. Statistically this most likely represents a ovarian cyst. The patient's uterus has apparently beenremoved.  The bony elements appear intact.  Impression:  1. Cyst on the left side of the pelvis. Statistically most likely represents an ovarian cyst. Evaluation with pelvic ultrasound is recommended.    - Pelvic US 12/21/22:  Transabdominal and transvaginal pelvic ultrasound was performed by the sonographer. Static images are submitted. Uterus is not visualized consistent with patient's history of previous partial hysterectomy. Right  ovary is not visualized. Patient provides a history of previous right oophorectomy. Left ovary measures 3.4 x 3.9 x 2.9 cm in size. Multiple left ovarian follicles are visualized. Small left ovarian cyst is visualized measuring 1.8 cm in diameter. Left ovarian blood flow is present. Trace amount of pelvic free fluid is present adjacent to the vaginal cuff.  IMPRESSION:  1. Nonvisualization of the uterus and right ovary consistent with patient's surgical history.  2. Small 1.8 cm left ovarian cyst.  3. Trace amount of pelvic free fluid.    - MRI C-spine 9/26/22:  FINDINGS:  Reversal of the normal cervical lordotic curvature. The vertebral body heights and alignment are maintained throughout the cervical spine. No abnormal vertebral body marrow signal. Multilevel intervertebral disc desiccation.  Spinal cord is normal in caliber and demonstrates normal signal. The visualized portions of the cervicomedullary junction are unremarkable.  C2-C3: Normal intervertebral disc contour. No central canal  or neural foraminal narrowing.  C3-C4: Intervertebral disc bulge narrows the ventral subarachnoid space. No central canal or neural foraminal narrowing.  C4-C5: Intervertebral disc bulge eccentric to the right side. There is narrowing of the ventral subarachnoid space. The central canal is borderline measuring 10 mm. Moderate right and mild left neural foraminal narrowing.   C5-C6: Intervertebral disc bulge with superimposed left paracentral disc protrusion. There is narrowing of the ventral subarachnoid space and left subarticular recess. Bilateral uncovertebral and facet hypertrophy. Moderate bilateral neural foraminal narrowing. The central canal is narrowed to 8 mm.  C6-C7: Intervertebral disc bulge narrows the ventral subarachnoid space. The central canal is narrowed to 9 mm. No neural foraminal narrowing.  C7-T1: Normal intervertebral disc contour. No central canal or neural foraminal narrowing.  The paravertebral soft tissues are unremarkable.  IMPRESSION:  Multilevel cervical spondylosis most prominent at C4-7. The findings are not significantly changed when compared to the prior exam.    - MRI Lumbar spine 8/17/21:  FINDINGS:  Alignment: Normal.  Vertebral bodies: Normal height and marrow signal.  T12-L1: No significant spinal canal stenosis or neuroforaminal narrowing.  L1-L2: No significant spinal canal stenosis or neuroforaminal narrowing.  L2-L3: No significant spinal canal stenosis or neuroforaminal narrowing.  L3-L4: No significant spinal canal stenosis or neuroforaminal narrowing.  L4-L5: No significant spinal canal stenosis or neuroforaminal narrowing.  L5-S1: No significant spinal canal stenosis or neuroforaminal narrowing.  Spinal cord: The cord extends down to the L1 level. The cord has a normal size, configuration and signal pattern.    - MRI Thoracic spine w/o 6/15/20:  FINDINGS:  Vertebral bodies: Normal vertebral body height, alignment and marrow signal.  C7-T1: The disc is normal. The  central canal and neural foramen appear normal. No displacement or compression of the neural elements are seen.  T1-T2: The disc has a normal contour. The central canal and neural foramen appear normal. No displacement or compression of the neural elements are seen.  T2-T3: The disc has a normal contour. The central canal and neural foramen appear normal. No displacement or compression of the neural elements are seen.  T3-T4: The disc has a normal contour. The central canal and neural foramen appear normal. No displacement or compression of the neural elements are seen.  T4-T5: The disc has a normal contour. The central canal and neural foramen appear normal. No displacement or compression of the neural elements are seen.  T5-T6: The disc has a normal contour. The central canal and neural foramen appear normal. No displacement or compression of the neural elements are seen.  T6-T7: The disc has a normal contour. The central canal and neural foramen appear normal. No displacement or compression of the neural elements are seen.  T7-T8: The disc has a normal contour. The central canal and neural foramen appear normal. No displacement or compression of the neural elements are seen.  T8-T9: The disc has a normal contour. The central canal and neural foramen appear normal. No displacement or compression of the neural elements are seen.  T9-T10: The disc has a normal contour. The central canal and neural foramen appear normal. No displacement or compression of the neural elements are seen.  T10-T11: The disc has a normal contour. The central canal and neural foramen appear normal. No displacement or compression of the neural elements are seen.  T11-T12: The disc has a normal contour. The central canal and neural foramen appear normal. No displacement or compression of the neural elements are seen.  T12-L1: The disc has a normal contour. The central canal and neural foramen appear normal. No displacement or compression of  the neural elements are seen.  Spinal cord: The cord has a normal size, configuration and signal pattern.  Additional findings: None seen.      Labs:  BMP  Lab Results   Component Value Date     08/16/2023    K 5.4 (H) 08/16/2023     08/16/2023    CO2 29 08/16/2023    BUN 22.2 (H) 08/16/2023    CREATININE 0.74 08/16/2023    CALCIUM 10.0 08/16/2023    ANIONGAP 9.0 08/16/2023     Lab Results   Component Value Date    AST 16 08/16/2023     Lab Results   Component Value Date     08/16/2023       Assessment:  Margareth Echeverria is a 44 y.o. female with the following diagnoses based on history, exam, and imaging:    Problem List Items Addressed This Visit    None  Visit Diagnoses       Cervical radiculopathy    -  Primary    Chronic pain syndrome        Relevant Medications    HYDROcodone-acetaminophen (NORCO) 5-325 mg per tablet    Cervico-occipital neuralgia        Chronic neck pain        Long term (current) use of opiate analgesic        Relevant Medications    HYDROcodone-acetaminophen (NORCO) 5-325 mg per tablet                This is a pleasant 44 y.o. lady presenting with:     - Neck pain and cervicogenic HA and left radicular arm pain. Appears to have occipital neuralgia on left which improved after occipital nerve blocks. She also has some myofascial pain on exam.    - Left radicular arm pain improved w/ OLEGARIO but has recurred.    - Patient with Cervical radiculopathy/radicular pain due to stenosis at multiple levels, but primarily due to the stenosis at C5-6 based on pain distribution.. The pain is severe enough to greatly impact quality of life &/or function as evidenced on the patients PEG score and NRS.    - Pain persists despite > 4 weeks of conservative treatment.    - Patient had > 50% relief relief for > 3 months with noted improved function measured on PEG scale with prior ALEKSANDR.   - Chronic bilateral low back pain: Pain appears primarily due to SIJ dysfunction on exam, but may have facet  and myofascial contributions as well.    - Back pain improved after bilateral SIJ CSI   - She does note some radiation into the legs in L4/5 distribution and MRI with mild canal stenosis at L4-5 with facet arthropathy  - Chronic pelvic pain: Pain appears multifactorial and she has a complex history with prior hysterectomy for endometriosis, interstitial cystitis, b/l oophorectomy for ovarian cyst, prior bilateral inguinal hernia repair and hypophosphatasia. Unclear exactly what the cause of the pain is at this point as she has multiple areas of pain.    - SIJ CSI did help some of the anterior pelvis pain as well  - Nasal pain and deformity after fracture.   - History of fibromyalgia.   - History of familial hypophosphatasia and h/o polyarthralgia   - Comorbidities: Depression. ADHD. Hypophosphatasia. H/o Trigeminal neuralgia. IBS w/ constipation. H/o AV block. Former smoker. Allergy to opioids.     Treatment Plan:   - PT/OT/HEP: Encouraged cont HEP for her SIJ pain and back pain.    - Discussed benefits of exercise for pain.   - Procedures: Schedule C7-T1 interlaminar epidural steroid injection under fluoroscopic guidance with local/MAC sedation. (CPT Code 70799).  The patient is not allergic to iodinated contrast which will be used for the procedure.. Patient is not currently taking blood thinners for CV prophylaxis.  - Consider left T6 & T7 intercostal nerve block  - If limited relief with ALEKSANDR, consider left C2-3, C3-4 diagnostic MBBs.   - Consider repeat b/l SIJ CSI if back pain returns/worsens  - Consider L4-5 IL ALEKSANDR for radicular leg pains   - Consider b/l superior hypogastric plexus block for chronic pelvic pain   - Consider ganglion impar and sacrococcygeal injection for coccydynia  - Medications:   - Cont Norco 5/325 mg q 4-6 hrs PRN pain (5 tab/d or #150 per month). This is 25 MME. Attempt to decrease to 4.5/d.   - Goal to be down to 4/d at next refill. Going slow.   - Cont baclofen 5-10 mg daily PRN. Try  to switch one hydrocodone pill for a baclofen tablet   - Cont tizanidine 4-8 mg qHS.    - Cont cymbalta 30 mg daily (didn't tolerate 60 mg)   - Holding gabapentin 300 mg qHS for HA ppx and chronic pain.    - Pain contract signed 08/16/2023    -  reviewed  - Imaging: Reviewed.   - Labs: Reviewed.     - UDS ordered today.   - Consulted rheumatology regarding hypophosphatasia, pending      Follow Up: RTC 2 weeks after procedure or sooner PRN    I spent a total of 41 minutes on the day of the visit.This includes face to face time and non-face to face time preparing to see the patient (eg, review of tests), obtaining and/or reviewing separately obtained history, documenting clinical information in the electronic or other health record, independently interpreting results and communicating results to the patient/family/caregiver, or care coordinator.      Katharine Steve M.D.  Interventional Pain Medicine / Physical Medicine & Rehabilitation

## 2024-08-16 ENCOUNTER — PATIENT MESSAGE (OUTPATIENT)
Dept: PRIMARY CARE CLINIC | Facility: CLINIC | Age: 44
End: 2024-08-16
Payer: COMMERCIAL

## 2024-08-16 DIAGNOSIS — F90.0 ATTENTION DEFICIT HYPERACTIVITY DISORDER (ADHD), PREDOMINANTLY INATTENTIVE TYPE: ICD-10-CM

## 2024-08-19 ENCOUNTER — TELEPHONE (OUTPATIENT)
Dept: PAIN MEDICINE | Facility: CLINIC | Age: 44
End: 2024-08-19
Payer: COMMERCIAL

## 2024-08-19 DIAGNOSIS — M54.12 CERVICAL RADICULOPATHY: Primary | ICD-10-CM

## 2024-08-19 RX ORDER — LISDEXAMFETAMINE DIMESYLATE 20 MG/1
20 CAPSULE ORAL EVERY MORNING
Qty: 30 CAPSULE | Refills: 0 | Status: SHIPPED | OUTPATIENT
Start: 2024-08-19

## 2024-08-19 NOTE — TELEPHONE ENCOUNTER
----- Message from Katharine Steve MD sent at 8/15/2024  1:11 PM CDT -----  Please schedule for C7-T1 [facility]LE[facility] w/ local/MAC and 2 week f/u with me after.

## 2024-08-26 ENCOUNTER — OUTSIDE PLACE OF SERVICE (OUTPATIENT)
Dept: PAIN MEDICINE | Facility: CLINIC | Age: 44
End: 2024-08-26

## 2024-08-27 ENCOUNTER — PATIENT MESSAGE (OUTPATIENT)
Dept: PAIN MEDICINE | Facility: CLINIC | Age: 44
End: 2024-08-27
Payer: COMMERCIAL

## 2024-08-27 DIAGNOSIS — R50.9 FEVER, UNSPECIFIED FEVER CAUSE: ICD-10-CM

## 2024-08-27 DIAGNOSIS — M54.2 CHRONIC NECK PAIN: Primary | ICD-10-CM

## 2024-08-27 DIAGNOSIS — G89.29 CHRONIC NECK PAIN: Primary | ICD-10-CM

## 2024-08-27 NOTE — TELEPHONE ENCOUNTER
I called and spoke with patient. Her neck is sore, but no significant change in pain. She denies new weakness or numbness and denies changes in bowel or bladder function. She does have a fever and does not feel well.     I advised she go to urgent care to be screened for common viral etiologies of her illness, but will order CT cervical spine with and without contrast and CBC, ESR and CRP out of utmost caution if urgent care visit is not revealing of the etiology of her fever.

## 2024-08-28 NOTE — TELEPHONE ENCOUNTER
Spoke with pt, faxed orders to her so that she can obtain labs. Will ask Ms Moore to start on auth asap for imaging

## 2024-08-29 LAB
BASOPHILS NFR BLD: 1.2 % (ref 0–3)
CRP QUANTITATIVE: < 0.1 MG/DL (ref 0–0.9)
EOSINOPHIL NFR BLD: 0.9 % (ref 1–3)
ERYTHROCYTE [DISTWIDTH] IN BLOOD BY AUTOMATED COUNT: 12.7 % (ref 12.5–18)
ERYTHROCYTE [SEDIMENTATION RATE] IN BLOOD: 11 MM/HR (ref 0–20)
HCT VFR BLD AUTO: 35.6 % (ref 37–47)
HGB BLD-MCNC: 12.6 G/DL (ref 12–16)
LYMPHOCYTES NFR BLD: 32.4 % (ref 25–40)
MCH RBC QN AUTO: 33.2 PG (ref 27–31.2)
MCHC RBC AUTO-ENTMCNC: 35.4 G/DL (ref 31.8–35.4)
MCV RBC AUTO: 93.7 FL (ref 80–97)
MONOCYTES NFR BLD: 7 % (ref 1–15)
NEUTROPHILS # BLD AUTO: 2.5 10*3/UL (ref 1.8–7.7)
NEUTROPHILS NFR BLD: 58.3 % (ref 37–80)
NUCLEATED RED BLOOD CELLS: 0 %
PLATELETS: 264 10*3/UL (ref 142–424)
RBC # BLD AUTO: 3.8 10*6/UL (ref 4.2–5.4)
WBC # BLD: 4.3 10*3/UL (ref 4.6–10.2)

## 2024-08-30 ENCOUNTER — TELEPHONE (OUTPATIENT)
Dept: PAIN MEDICINE | Facility: CLINIC | Age: 44
End: 2024-08-30
Payer: COMMERCIAL

## 2024-08-30 ENCOUNTER — PATIENT MESSAGE (OUTPATIENT)
Dept: PAIN MEDICINE | Facility: CLINIC | Age: 44
End: 2024-08-30
Payer: COMMERCIAL

## 2024-08-30 NOTE — TELEPHONE ENCOUNTER
Called pt to inform her that Dr Steve did review her labs and imaging.     Per Dr Steve:   Schedule patient in next available with me or CJ, but labs look fine with no signs of infection at this point. We may want to recheck     if she does get worse over the weekend to go to ED, but verything looks okay right now    Called pt, no answer. Left  for pt to call office back

## 2024-09-04 DIAGNOSIS — F41.8 SITUATIONAL ANXIETY: ICD-10-CM

## 2024-09-04 RX ORDER — DULOXETIN HYDROCHLORIDE 30 MG/1
30 CAPSULE, DELAYED RELEASE ORAL DAILY
Qty: 90 CAPSULE | Refills: 1 | Status: SHIPPED | OUTPATIENT
Start: 2024-09-04 | End: 2025-03-03

## 2024-09-10 ENCOUNTER — OFFICE VISIT (OUTPATIENT)
Dept: PAIN MEDICINE | Facility: CLINIC | Age: 44
End: 2024-09-10
Payer: COMMERCIAL

## 2024-09-10 VITALS
SYSTOLIC BLOOD PRESSURE: 122 MMHG | BODY MASS INDEX: 19.61 KG/M2 | WEIGHT: 114.88 LBS | HEIGHT: 64 IN | HEART RATE: 60 BPM | DIASTOLIC BLOOD PRESSURE: 71 MMHG

## 2024-09-10 DIAGNOSIS — M54.2 CHRONIC NECK PAIN: ICD-10-CM

## 2024-09-10 DIAGNOSIS — M54.12 CERVICAL RADICULOPATHY: Primary | ICD-10-CM

## 2024-09-10 DIAGNOSIS — G89.29 CHRONIC NECK PAIN: ICD-10-CM

## 2024-09-10 DIAGNOSIS — G89.4 CHRONIC PAIN SYNDROME: ICD-10-CM

## 2024-09-10 DIAGNOSIS — Z79.891 LONG TERM (CURRENT) USE OF OPIATE ANALGESIC: ICD-10-CM

## 2024-09-10 RX ORDER — GABAPENTIN 300 MG/1
300 CAPSULE ORAL
COMMUNITY
Start: 2024-08-12 | End: 2024-09-10 | Stop reason: SDUPTHER

## 2024-09-10 RX ORDER — GABAPENTIN 300 MG/1
300 CAPSULE ORAL NIGHTLY
Qty: 30 CAPSULE | Refills: 11 | Status: SHIPPED | OUTPATIENT
Start: 2024-09-10

## 2024-09-10 RX ORDER — NORTRIPTYLINE HYDROCHLORIDE 10 MG/1
CAPSULE ORAL
COMMUNITY
Start: 2024-08-09

## 2024-09-10 RX ORDER — HYDROCODONE BITARTRATE AND ACETAMINOPHEN 5; 325 MG/1; MG/1
1 TABLET ORAL EVERY 6 HOURS PRN
Qty: 120 TABLET | Refills: 0 | Status: SHIPPED | OUTPATIENT
Start: 2024-09-10 | End: 2024-10-10

## 2024-09-10 NOTE — PROGRESS NOTES
Pain Medicine      Chief Complaint:   Chief Complaint   Patient presents with    Neck Pain       History of Present Illness: Margareth Echeverria is a 44 y.o. female referred by Dr. Nelda Thornton for Pelvic pain.      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 and she has a history of chronic pelvic pain and LBP with prior hysterectomy and b/l oophorectomy and she was feeling pretty good in regards to her chronic pelvic pain until Mother's day where she developed rather sudden increase in her pelvic pain with no clear inciting event  Location: involves her entire pelvis, but seems to be primarily localized over her sacrum area  Radiation: She feels some radiation into her anterior thighs  Timing: constant  Quality: Aching, Throbbing, Pounding, Deep, Stabbing, and nagging and swelling  Exacerbating Factors: Bowel movements, valsalva, intercourse, sitting, standing prolonged  Alleviating Factors: nothing  Associated Symptoms: She has neuropathy in her legs/feet from prior to this incident. She feels weakness in her legs. She has chronic constipation, but has been having daily BMs. She does note recent weight loss (states about 30 lbs). She does note night sweats. She denies fevers, urinary incontinence/change in function and bowel incontinence/change in function    Nothing seems to be helping, she has tried heating, cold. She saw gynecology and was sent here and to pelvic floor rehab.     She denies aggravation with urination.     Severity: Currently: 8/10   Typical Range: 5-10/10     Exacerbation: 10/10     P = 8  E = 10  G = 10  Baseline PEG Score = 9.33    Interval History (08/16/2023):  She is mostly having HA pain over the left side of her scalp. Pain radiates from the occiput to her parietal region and is constant and has been present for days. Stress seems to be aggravating it. She states she has occipital neuralgia and typically gets  injections of her occipital nerves which helps her HA. Pain has been worsening because she has been under a lot of stress. Her current physician managing her pain medicine, Dr. Anaya, has discharged her from clinic for no reason due to her having a procedure (SIJ CSI) with this clinic which helped her. She did nothing to breach her pain contract, but she has been having more difficulty with sleeping and worsening pain with all the stress he has been putting her through. She is prescribed hydrocodone/APAP 10/325 mg QID PRN and states she was not needing to take it as often until he discharged her out of the blue, just because she had her procedure done with this clinic instead of with the interventional pain physicians at the clinic that Dr. Anaya works.     Interval History (10/13/2023):  Neck pain: She is mostly having the pain in the left cervico-occipital region and this radiates into the left trapezius and down the left arm with numbness and tingling into the left hand. She denies any clear changes in strength recently. She denies changes in b/b function.     Interval History (07/16/2024):  Margareth Echeverria returns today for follow up.  At the last clinic visit, cont Norco 5/325 mg q 4-6 hrs PRN pain (5 tab/d or #150 per month), cont baclofen 5-10 mg daily PRN. Try to switch one hydrocodone pill for a baclofen tablet, cont tizanidine 4-8 mg qHS, cont cymbalta 30 mg daily (didn't tolerate 60 mg) and was holding gabapentin 300 mg qHS for HA ppx and chronic pain.     Currently, the neck and back pain are stable.  She had a breast augmentation with a pectoralis muscle repair. She is having some persistent pain that is worst at night on the left side around mid-clavicular area around level of xiphoid/T6-7 distribution    Patient is currently taking Tizanidine, Baclofen, Cymbalta, and Norco which provides 75% relief.  She denies side effects.    Current Pain Scales:  Current: 5/10              Typical Range: 4-7/10      Interval History (08/15/2024):  Margareth Echeverria returns today for follow up.  At the last clinic visit, cont current med regimen.    Currently, the neck pain and HAs are getting worse since last week. Primarily in the left upper cervical, suboccipital region with radiation over the back of the head and into both shoulders and down the left arm into the thumb, index and middle fingers.  She has tried her HEP. She is not sleeping well and having trouble exercising with it.     She continues the hydrocodone 5/325 mg and not ready to taper yet but has been able to do 4.5 tablets on several days. She cont the tizanidine and baclofen which are helping.       Current Pain Scales:  Current: 8/10              Typical Range: 7-9/10    Interval History (09/10/2024):  Margareth Echeverria returns today for follow up.  At the last clinic visit,  Encouraged cont HEP for her SIJ pain and back pain, schedule C7-T1 IL ALEKSANDR, Cont Norco 5/325 mg q 4-6 hrs PRN pain, Cont baclofen 5-10 mg daily PRN, Cont tizanidine 4-8 mg qHS, Cont cymbalta 30 mg daily    C7-T1 IL ALEKSANDR provided 75% relief.     Currently, the neck pain is improved.  She did take a flight after the procedure and stood and hit her head while on the flight pretty hard which has re-exacerbated some of her pain, but still feels improved overall.    She cont to take hydrocodone but is ready to decrease frequency. The gabapentin has been helping and wants to restart.     Current Pain Scales:  Current: 5/10              Typical Range: 5-7/10     Current PEG Score: 6      Opioid Risk Score         Value Time User    Opioid Risk Score  7 6/8/2023 10:19 AM Katharine Steve MD           Previous Interventions:  - 8/26/24: C7-T1 IL ALEKSANDR w/ 75% relief  - 1/8/24: C7-T1 IL ALEKSANDR w/ 80% relief for > 6 months  - 8/16/23: left DANY and SHANELLE block in clinic with US guidance w/ 4 mg/mL dexamethasone and lido 1% which provided nearly complete relief.   - 6/23/23: B/L SIJ CSI w/ > 50% relief in pain  and improved fxn.   - Procedures for neck and back pain, but not for pelvic pain    Previous Therapies:  PT/OT: yes for back pain but not pelvic floor  Relevant Surgery: yes    - surgery for trigeminal neuralgia  Previous Medications:   - NSAIDS:   - Muscle Relaxants: Zanaflex 2mg   - TCAs:   - SNRIs: cymbalta  - Topicals: CBD  - Anticonvulsants:  gabapentin made her feel drunk  - Opioids: hydrocodone    Current Pain Medications:  Cymbalta 30 mg  Norco 5/325mg   Tizanidine 2 mg   Gabapentin 300 mg   Baclofen 5-10    Blood Thinners: No    Full Medication List:    Current Outpatient Medications:     baclofen (LIORESAL) 10 MG tablet, Take 0.5-1 tablets (5-10 mg total) by mouth daily as needed (pain or spasm)., Disp: 30 tablet, Rfl: 2    COMPOUND HORMONE REPLACEMENT, Take by mouth once daily. Inject 1 pellet Q 3-4 months to hip, Disp: , Rfl:     cyclobenzaprine (FLEXERIL) 10 MG tablet, TAKE 1 TABLET BY MOUTH EVERY NIGHT AT BEDTIME TO PREVENT MUSCLE SPASMS, Disp: , Rfl:     DULoxetine (CYMBALTA) 30 MG capsule, Take 1 capsule (30 mg total) by mouth once daily., Disp: 90 capsule, Rfl: 1    lisdexamfetamine (VYVANSE) 20 MG capsule, Take 1 capsule (20 mg total) by mouth every morning., Disp: 30 capsule, Rfl: 0    nortriptyline (PAMELOR) 10 MG capsule, , Disp: , Rfl:     ondansetron (ZOFRAN) 4 MG tablet, Take 1 tablet (4 mg total) by mouth every 8 (eight) hours as needed for Nausea., Disp: 30 tablet, Rfl: 0    tirzepatide 2.5 mg/0.5 mL PnIj, Inject 2.5 mg into the skin every 7 days., Disp: , Rfl:     tiZANidine (ZANAFLEX) 4 MG tablet, Take 1-2 tablets (4-8 mg total) by mouth nightly as needed (pain and sleep)., Disp: 60 tablet, Rfl: 5    gabapentin (NEURONTIN) 300 MG capsule, Take 1 capsule (300 mg total) by mouth every evening., Disp: 30 capsule, Rfl: 11    HYDROcodone-acetaminophen (NORCO) 5-325 mg per tablet, Take 1 tablet by mouth every 6 (six) hours as needed for Pain., Disp: 120 tablet, Rfl: 0     Review of  "Systems:  ROS    Allergies:  Opioids - morphine analogues and Morphine     Medical History:   has a past medical history of ADHD (attention deficit hyperactivity disorder), Adult hypophosphatasia, AV block, Fibromyalgia, IBS (irritable bowel syndrome), IC (interstitial cystitis), Occipital neuralgia, and Trigeminal neuralgia.    Surgical History:   has a past surgical history that includes Inguinal hernia repair (Bilateral); Laparoscopic cholecystectomy; Augmentation of breast; Fulguration of endometriosis (07/05/2016); Laparoscopy-assisted vaginal hysterectomy (11/11/2019); Laparoscopy (12/08/2019); Appendectomy; Cystoscopy with hydrodistension and biopsy of bladder (12/28/2020); Removal of breast implant (04/2021); Hysterectomy; and Breast surgery (Bilateral).    Family History:  family history includes Breast cancer in her maternal cousin; Lung cancer in her paternal grandmother; No Known Problems in her father and mother.    Social History:   reports that she quit smoking about 12 years ago. Her smoking use included cigarettes. She started smoking about 22 years ago. She has a 5 pack-year smoking history. She has never been exposed to tobacco smoke. She has never used smokeless tobacco. She reports current alcohol use. She reports current drug use. Drug: Marijuana.    Physical Exam:  /71   Pulse 60   Ht 5' 4" (1.626 m)   Wt 52.1 kg (114 lb 13.8 oz)   BMI 19.72 kg/m²   GEN: No acute distress. Calm, comfortable  HENT: Normocephalic, atraumatic, moist mucous membranes  EYE: Anicteric sclera, non-injected.   CV: Non-diaphoretic. Regular Rate. Radial Pulses 2+.  RESP: Breathing comfortably. Chest expansion symmetric.  EXT: No clubbing, cyanosis.   SKIN: Warm, & dry to palpation. No visible rashes or lesions of exposed skin.   PSYCH: Pleasant mood and appropriate affect. Recent and remote memory intact.   GAIT: Independent, normal ambulation  Neck Exam:       Inspection: No erythema, bruising.       " Palpation: (+) TTP of left cervical paraspinals and over SHANELLE and DANY      ROM: No significant Limitation in flexion, extension, lateral bending or rotation      Provocative Maneuvers:  (-) Spurling's bilaterally  Lumbar Spine Exam:       Inspection: No bruising, swelling. Slight erythema noted at bilateral injection sites.        Palpation: No calor to the area. (+) TTP of b/l SIJ > lumbar paraspinals b/l. (+) TTP of sacrum and coccyx         ROM:  Limited in flexion, extension, lateral bending.       (+) Facet loading bilaterally      (-) Straight Leg Raise bilaterally      (+) ISABELLA bilaterally      (+) SIJ Compression Test bilaterally      (+) Gaenslan's Test bilaterally      (+) Thigh thrust/Posterior Pelvic Pain Provocation Test bilaterally      (+) Sacral thrust  Hip/Pelvis Exam:      Inspection: No gross deformity or apparent leg length discrepancy      Palpation: (+) TTP along inguinal ligaments b/l, pubic symphysis. (+) TTP right ischial bursa and b/l piriformis and gluteal muscles, (-) TTP to bilateral greater trochanteric bursas.       ROM:  No limitation or pain in internal rotation, external rotation b/l  Neurologic Exam:     Alert. Speech is fluent and appropriate.     Strength: 4/5 in left AbDM, wrist extension, 4/5 diffusely in the left leg, otherwise 5/5 throughout bilateral upper and lower extremities     Sensation:  Grossly intact to light touch in bilateral upper and lower extremities     Reflexes: 2+ in b/l BR, biceps, triceps, patella, achilles     Tone: No abnormality appreciated in bilateral lower extremities     No Clonus     Downgoing toes on plantar stimulation     (-) Franco bilaterally          No idea what I because      Imaging:  - MRI C-spine 11/21/23:      - X-Ray Cervical Spine AP & Lateral 10/13/23     FINDINGS:  There is some reversal of the normal cervical lordosis.  Vertebral body heights are within normal limits.  No definite spondylolisthesis demonstrated.  Mild-to-moderate  disc height loss noted at C5-6.  Mild disc height loss at C4-5. Posterior elements appear intact without acute fractures or subluxations demonstrated.  Odontoid process appears intact.  Atlantoaxial articulations appear normal.  Prevertebral soft tissues are within normal limits.  Postoperative changes are noted involving the skull on the left in the suboccipital region region.     Impression:     Degenerative findings as above    - MRI L-spine 6/13/23:  Vertebral body alignment is preserved. Vertebral body heights are maintained. Bone marrow signal is within normal limits. The conus medullaris terminates normally at the level of L2. Lumbar nerve roots have normal appearance. Preserved intervertebral disc space height and signal.    T12-L1: No significant canal or foraminal stenosis.    L1-L2: No significant canal or foraminal stenosis.    L2-L3: No significant canal or foraminal stenosis.    L3-L4: No significant canal or foraminal stenosis.    L4-L5: Broad-based disc bulge, ligamentum flavum thickening, facet hypertrophy with at most mild canal stenosis. Mild effacement of the lateral recesses. No significant foraminal stenosis.    L5-S1: No significant canal or foraminal stenosis.        - MRI Pelvis w.o 6/13/23:  The femoral heads are well formed and seated within well formed acetabula. Bone marrow signal is normal. The sacroiliac joints are intact. There is no muscle atrophy or edema. There is no intrapelvic visceral abnormalities. Prior hysterectomy. There is no evidence of fluid collection. There is mild right hamstring origin tendinosis. The gluteus medius and minimus tendon insertions are intact. The hip adductors are intact. There is no evidence of iliopsoas or trochanteric bursitis.    Impression:    Mild right hamstring origin tendinosis. Otherwise, negative pelvic MRI.      - X-ray lumbar spine 6/8/23:  Transitional anatomy with sacralization of L5.  Vertebral body heights maintained.  No  spondylolisthesis.  Disc spaces maintained.  Mild facet arthropathy.  No acute osseous abnormality.  Bilateral SI joint degenerative changes.  Constipation suspected.    - X-ray pelvis 6/8/23:  Hip joint spaces maintained.  Likely transitional anatomy with L5 sacralization.  Symmetric degenerative findings of the bilateral SI joints.  Constipation findings noted.    - Pelvis CT w/o contrast 1/6/23:  The visualized gastrointestinal tract appears normal.  Urinary bladder is normal.  Inguinal regions are normal.  No masses, fluid collections or adenopathy seen.  1. There is a cystic area on the left side of the pelvis measuring about 5.8 cm. This has a simple cystic appearance to it. Statistically this most likely represents a ovarian cyst. The patient's uterus has apparently beenremoved.  The bony elements appear intact.  Impression:  1. Cyst on the left side of the pelvis. Statistically most likely represents an ovarian cyst. Evaluation with pelvic ultrasound is recommended.    - Pelvic US 12/21/22:  Transabdominal and transvaginal pelvic ultrasound was performed by the sonographer. Static images are submitted. Uterus is not visualized consistent with patient's history of previous partial hysterectomy. Right  ovary is not visualized. Patient provides a history of previous right oophorectomy. Left ovary measures 3.4 x 3.9 x 2.9 cm in size. Multiple left ovarian follicles are visualized. Small left ovarian cyst is visualized measuring 1.8 cm in diameter. Left ovarian blood flow is present. Trace amount of pelvic free fluid is present adjacent to the vaginal cuff.  IMPRESSION:  1. Nonvisualization of the uterus and right ovary consistent with patient's surgical history.  2. Small 1.8 cm left ovarian cyst.  3. Trace amount of pelvic free fluid.    - MRI C-spine 9/26/22:  FINDINGS:  Reversal of the normal cervical lordotic curvature. The vertebral body heights and alignment are maintained throughout the cervical spine. No  abnormal vertebral body marrow signal. Multilevel intervertebral disc desiccation.  Spinal cord is normal in caliber and demonstrates normal signal. The visualized portions of the cervicomedullary junction are unremarkable.  C2-C3: Normal intervertebral disc contour. No central canal or neural foraminal narrowing.  C3-C4: Intervertebral disc bulge narrows the ventral subarachnoid space. No central canal or neural foraminal narrowing.  C4-C5: Intervertebral disc bulge eccentric to the right side. There is narrowing of the ventral subarachnoid space. The central canal is borderline measuring 10 mm. Moderate right and mild left neural foraminal narrowing.   C5-C6: Intervertebral disc bulge with superimposed left paracentral disc protrusion. There is narrowing of the ventral subarachnoid space and left subarticular recess. Bilateral uncovertebral and facet hypertrophy. Moderate bilateral neural foraminal narrowing. The central canal is narrowed to 8 mm.  C6-C7: Intervertebral disc bulge narrows the ventral subarachnoid space. The central canal is narrowed to 9 mm. No neural foraminal narrowing.  C7-T1: Normal intervertebral disc contour. No central canal or neural foraminal narrowing.  The paravertebral soft tissues are unremarkable.  IMPRESSION:  Multilevel cervical spondylosis most prominent at C4-7. The findings are not significantly changed when compared to the prior exam.    - MRI Lumbar spine 8/17/21:  FINDINGS:  Alignment: Normal.  Vertebral bodies: Normal height and marrow signal.  T12-L1: No significant spinal canal stenosis or neuroforaminal narrowing.  L1-L2: No significant spinal canal stenosis or neuroforaminal narrowing.  L2-L3: No significant spinal canal stenosis or neuroforaminal narrowing.  L3-L4: No significant spinal canal stenosis or neuroforaminal narrowing.  L4-L5: No significant spinal canal stenosis or neuroforaminal narrowing.  L5-S1: No significant spinal canal stenosis or neuroforaminal  narrowing.  Spinal cord: The cord extends down to the L1 level. The cord has a normal size, configuration and signal pattern.    - MRI Thoracic spine w/o 6/15/20:  FINDINGS:  Vertebral bodies: Normal vertebral body height, alignment and marrow signal.  C7-T1: The disc is normal. The central canal and neural foramen appear normal. No displacement or compression of the neural elements are seen.  T1-T2: The disc has a normal contour. The central canal and neural foramen appear normal. No displacement or compression of the neural elements are seen.  T2-T3: The disc has a normal contour. The central canal and neural foramen appear normal. No displacement or compression of the neural elements are seen.  T3-T4: The disc has a normal contour. The central canal and neural foramen appear normal. No displacement or compression of the neural elements are seen.  T4-T5: The disc has a normal contour. The central canal and neural foramen appear normal. No displacement or compression of the neural elements are seen.  T5-T6: The disc has a normal contour. The central canal and neural foramen appear normal. No displacement or compression of the neural elements are seen.  T6-T7: The disc has a normal contour. The central canal and neural foramen appear normal. No displacement or compression of the neural elements are seen.  T7-T8: The disc has a normal contour. The central canal and neural foramen appear normal. No displacement or compression of the neural elements are seen.  T8-T9: The disc has a normal contour. The central canal and neural foramen appear normal. No displacement or compression of the neural elements are seen.  T9-T10: The disc has a normal contour. The central canal and neural foramen appear normal. No displacement or compression of the neural elements are seen.  T10-T11: The disc has a normal contour. The central canal and neural foramen appear normal. No displacement or compression of the neural elements are  seen.  T11-T12: The disc has a normal contour. The central canal and neural foramen appear normal. No displacement or compression of the neural elements are seen.  T12-L1: The disc has a normal contour. The central canal and neural foramen appear normal. No displacement or compression of the neural elements are seen.  Spinal cord: The cord has a normal size, configuration and signal pattern.  Additional findings: None seen.      Labs:  BMP  Lab Results   Component Value Date     08/16/2023    K 5.4 (H) 08/16/2023     08/16/2023    CO2 29 08/16/2023    BUN 22.2 (H) 08/16/2023    CREATININE 0.74 08/16/2023    CALCIUM 10.0 08/16/2023    ANIONGAP 9.0 08/16/2023     Lab Results   Component Value Date    AST 16 08/16/2023     Lab Results   Component Value Date     08/16/2023       Assessment:  Margareth Echeverria is a 44 y.o. female with the following diagnoses based on history, exam, and imaging:    Problem List Items Addressed This Visit    None  Visit Diagnoses       Cervical radiculopathy    -  Primary    Relevant Medications    gabapentin (NEURONTIN) 300 MG capsule    Chronic pain syndrome        Relevant Medications    HYDROcodone-acetaminophen (NORCO) 5-325 mg per tablet    gabapentin (NEURONTIN) 300 MG capsule    Long term (current) use of opiate analgesic        Relevant Medications    HYDROcodone-acetaminophen (NORCO) 5-325 mg per tablet    gabapentin (NEURONTIN) 300 MG capsule    Chronic neck pain        Relevant Medications    gabapentin (NEURONTIN) 300 MG capsule                  This is a pleasant 44 y.o. lady presenting with:     - Neck pain and cervicogenic HA and left radicular arm pain. Appears to have occipital neuralgia on left which improved after occipital nerve blocks. She also has some myofascial pain on exam.    - Left radicular arm pain improved w/ OLEGARIO but has recurred.    - Patient with Cervical radiculopathy/radicular pain due to stenosis at multiple levels, but primarily due  to the stenosis at C5-6 based on pain distribution.. The pain is severe enough to greatly impact quality of life &/or function as evidenced on the patients PEG score and NRS.    - Pain persists despite > 4 weeks of conservative treatment.    - Patient had > 50% relief relief for > 3 months with noted improved function measured on PEG scale with prior ALEKSANDR.   - Chronic bilateral low back pain: Pain appears primarily due to SIJ dysfunction on exam, but may have facet and myofascial contributions as well.    - Back pain improved after bilateral SIJ CSI   - She does note some radiation into the legs in L4/5 distribution and MRI with mild canal stenosis at L4-5 with facet arthropathy  - Chronic pelvic pain: Pain appears multifactorial and she has a complex history with prior hysterectomy for endometriosis, interstitial cystitis, b/l oophorectomy for ovarian cyst, prior bilateral inguinal hernia repair and hypophosphatasia. Unclear exactly what the cause of the pain is at this point as she has multiple areas of pain.    - SIJ CSI did help some of the anterior pelvis pain as well  - Nasal pain and deformity after fracture.   - History of fibromyalgia.   - History of familial hypophosphatasia and h/o polyarthralgia   - Comorbidities: Depression. ADHD. Hypophosphatasia. H/o Trigeminal neuralgia. IBS w/ constipation. H/o AV block. Former smoker. Allergy to opioids.     Treatment Plan:   - PT/OT/HEP: Provided HEP for neck pain.    - Discussed benefits of exercise for pain.   - Procedures:   - Can repeat C7-T1 IL ALEKSANDR PRN  - Consider left T6 & T7 intercostal nerve block  - If limited relief with ALEKSANDR, consider left C2-3, C3-4 diagnostic MBBs.   - Consider repeat b/l SIJ CSI if back pain returns/worsens  - Consider L4-5 IL ALEKSANDR for radicular leg pains   - Consider b/l superior hypogastric plexus block for chronic pelvic pain   - Consider ganglion impar and sacrococcygeal injection for coccydynia  - Medications:   - Decrease Norco  5/325 mg to q 6 hrs PRN pain (#120 per month). This is 20 MME.    - Goal to be down to 3.5/d at next refill. Going slow.   - Cont baclofen 5-10 mg daily PRN. Try to switch one hydrocodone pill for a baclofen tablet   - Cont tizanidine 4-8 mg qHS.    - Cont cymbalta 30 mg daily (didn't tolerate 60 mg)   - Restart gabapentin 300 mg qHS as helping HA ppx and chronic pain.    - Pain contract signed 08/16/2023    -  reviewed  - Imaging: Reviewed.   - Labs: Reviewed.        Follow Up: RTC 2 months or sooner PRN    I spent a total of 45 minutes on the day of the visit.This includes face to face time and non-face to face time preparing to see the patient (eg, review of tests), obtaining and/or reviewing separately obtained history, documenting clinical information in the electronic or other health record, independently interpreting results and communicating results to the patient/family/caregiver, or care coordinator.      Katharine Steve M.D.  Interventional Pain Medicine / Physical Medicine & Rehabilitation

## 2024-09-15 DIAGNOSIS — F90.0 ATTENTION DEFICIT HYPERACTIVITY DISORDER (ADHD), PREDOMINANTLY INATTENTIVE TYPE: ICD-10-CM

## 2024-09-16 RX ORDER — LISDEXAMFETAMINE DIMESYLATE 20 MG/1
20 CAPSULE ORAL EVERY MORNING
Qty: 30 CAPSULE | Refills: 0 | Status: SHIPPED | OUTPATIENT
Start: 2024-09-16

## 2024-09-18 ENCOUNTER — PATIENT MESSAGE (OUTPATIENT)
Dept: PAIN MEDICINE | Facility: CLINIC | Age: 44
End: 2024-09-18
Payer: COMMERCIAL

## 2024-10-08 ENCOUNTER — PATIENT MESSAGE (OUTPATIENT)
Dept: PAIN MEDICINE | Facility: CLINIC | Age: 44
End: 2024-10-08
Payer: COMMERCIAL

## 2024-10-10 ENCOUNTER — OFFICE VISIT (OUTPATIENT)
Dept: PAIN MEDICINE | Facility: CLINIC | Age: 44
End: 2024-10-10
Payer: COMMERCIAL

## 2024-10-10 VITALS
OXYGEN SATURATION: 99 % | BODY MASS INDEX: 19.46 KG/M2 | HEIGHT: 64 IN | WEIGHT: 114 LBS | HEART RATE: 75 BPM | SYSTOLIC BLOOD PRESSURE: 109 MMHG | DIASTOLIC BLOOD PRESSURE: 68 MMHG

## 2024-10-10 DIAGNOSIS — Z79.891 LONG TERM (CURRENT) USE OF OPIATE ANALGESIC: ICD-10-CM

## 2024-10-10 DIAGNOSIS — M54.12 CERVICAL RADICULOPATHY: ICD-10-CM

## 2024-10-10 DIAGNOSIS — M47.812 CERVICAL SPONDYLOSIS: Primary | ICD-10-CM

## 2024-10-10 DIAGNOSIS — M54.2 CHRONIC NECK PAIN: ICD-10-CM

## 2024-10-10 DIAGNOSIS — G89.29 CHRONIC NECK PAIN: ICD-10-CM

## 2024-10-10 DIAGNOSIS — G89.4 CHRONIC PAIN SYNDROME: ICD-10-CM

## 2024-10-10 PROCEDURE — 3078F DIAST BP <80 MM HG: CPT | Mod: CPTII,,, | Performed by: PHYSICAL MEDICINE & REHABILITATION

## 2024-10-10 PROCEDURE — 1159F MED LIST DOCD IN RCRD: CPT | Mod: CPTII,,, | Performed by: PHYSICAL MEDICINE & REHABILITATION

## 2024-10-10 PROCEDURE — 1160F RVW MEDS BY RX/DR IN RCRD: CPT | Mod: CPTII,,, | Performed by: PHYSICAL MEDICINE & REHABILITATION

## 2024-10-10 PROCEDURE — 3074F SYST BP LT 130 MM HG: CPT | Mod: CPTII,,, | Performed by: PHYSICAL MEDICINE & REHABILITATION

## 2024-10-10 PROCEDURE — 99214 OFFICE O/P EST MOD 30 MIN: CPT | Mod: S$PBB,,, | Performed by: PHYSICAL MEDICINE & REHABILITATION

## 2024-10-10 PROCEDURE — 3008F BODY MASS INDEX DOCD: CPT | Mod: CPTII,,, | Performed by: PHYSICAL MEDICINE & REHABILITATION

## 2024-10-10 RX ORDER — HYDROCODONE BITARTRATE AND ACETAMINOPHEN 5; 325 MG/1; MG/1
1 TABLET ORAL EVERY 6 HOURS PRN
Qty: 120 TABLET | Refills: 0 | Status: SHIPPED | OUTPATIENT
Start: 2024-10-10 | End: 2024-11-09

## 2024-10-10 RX ORDER — GABAPENTIN 300 MG/1
300 CAPSULE ORAL 3 TIMES DAILY
Qty: 90 CAPSULE | Refills: 11 | Status: SHIPPED | OUTPATIENT
Start: 2024-10-10

## 2024-10-10 NOTE — PROGRESS NOTES
Pain Medicine      Chief Complaint:   Chief Complaint   Patient presents with    Neck Pain       History of Present Illness: Margareth Echeverria is a 44 y.o. female referred by Dr. Nelda Thornton for Pelvic pain.      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 and she has a history of chronic pelvic pain and LBP with prior hysterectomy and b/l oophorectomy and she was feeling pretty good in regards to her chronic pelvic pain until Mother's day where she developed rather sudden increase in her pelvic pain with no clear inciting event  Location: involves her entire pelvis, but seems to be primarily localized over her sacrum area  Radiation: She feels some radiation into her anterior thighs  Timing: constant  Quality: Aching, Throbbing, Pounding, Deep, Stabbing, and nagging and swelling  Exacerbating Factors: Bowel movements, valsalva, intercourse, sitting, standing prolonged  Alleviating Factors: nothing  Associated Symptoms: She has neuropathy in her legs/feet from prior to this incident. She feels weakness in her legs. She has chronic constipation, but has been having daily BMs. She does note recent weight loss (states about 30 lbs). She does note night sweats. She denies fevers, urinary incontinence/change in function and bowel incontinence/change in function    Nothing seems to be helping, she has tried heating, cold. She saw gynecology and was sent here and to pelvic floor rehab.     She denies aggravation with urination.     Severity: Currently: 8/10   Typical Range: 5-10/10     Exacerbation: 10/10     P = 8  E = 10  G = 10  Baseline PEG Score = 9.33    Interval History (08/16/2023):  She is mostly having HA pain over the left side of her scalp. Pain radiates from the occiput to her parietal region and is constant and has been present for days. Stress seems to be aggravating it. She states she has occipital neuralgia and typically gets  injections of her occipital nerves which helps her HA. Pain has been worsening because she has been under a lot of stress. Her current physician managing her pain medicine, Dr. Anaya, has discharged her from clinic for no reason due to her having a procedure (SIJ CSI) with this clinic which helped her. She did nothing to breach her pain contract, but she has been having more difficulty with sleeping and worsening pain with all the stress he has been putting her through. She is prescribed hydrocodone/APAP 10/325 mg QID PRN and states she was not needing to take it as often until he discharged her out of the blue, just because she had her procedure done with this clinic instead of with the interventional pain physicians at the clinic that Dr. Anaya works.     Interval History (10/13/2023):  Neck pain: She is mostly having the pain in the left cervico-occipital region and this radiates into the left trapezius and down the left arm with numbness and tingling into the left hand. She denies any clear changes in strength recently. She denies changes in b/b function.     Interval History (07/16/2024):  Margareth Echeverria returns today for follow up.  At the last clinic visit, cont Norco 5/325 mg q 4-6 hrs PRN pain (5 tab/d or #150 per month), cont baclofen 5-10 mg daily PRN. Try to switch one hydrocodone pill for a baclofen tablet, cont tizanidine 4-8 mg qHS, cont cymbalta 30 mg daily (didn't tolerate 60 mg) and was holding gabapentin 300 mg qHS for HA ppx and chronic pain.     Currently, the neck and back pain are stable.  She had a breast augmentation with a pectoralis muscle repair. She is having some persistent pain that is worst at night on the left side around mid-clavicular area around level of xiphoid/T6-7 distribution    Patient is currently taking Tizanidine, Baclofen, Cymbalta, and Norco which provides 75% relief.  She denies side effects.    Current Pain Scales:  Current: 5/10              Typical Range: 4-7/10      Interval History (08/15/2024):  Margareth Echeverria returns today for follow up.  At the last clinic visit, cont current med regimen.    Currently, the neck pain and HAs are getting worse since last week. Primarily in the left upper cervical, suboccipital region with radiation over the back of the head and into both shoulders and down the left arm into the thumb, index and middle fingers.  She has tried her HEP. She is not sleeping well and having trouble exercising with it.     She continues the hydrocodone 5/325 mg and not ready to taper yet but has been able to do 4.5 tablets on several days. She cont the tizanidine and baclofen which are helping.       Current Pain Scales:  Current: 8/10              Typical Range: 7-9/10    Interval History (09/10/2024):  Margareth Echeverria returns today for follow up.  At the last clinic visit,  Encouraged cont HEP for her SIJ pain and back pain, schedule C7-T1 IL ALEKSANDR, Cont Norco 5/325 mg q 4-6 hrs PRN pain, Cont baclofen 5-10 mg daily PRN, Cont tizanidine 4-8 mg qHS, Cont cymbalta 30 mg daily    C7-T1 IL ALEKSANDR provided 75% relief.     Currently, the neck pain is improved.  She did take a flight after the procedure and stood and hit her head while on the flight pretty hard which has re-exacerbated some of her pain, but still feels improved overall.    She cont to take hydrocodone but is ready to decrease frequency. The gabapentin has been helping and wants to restart.     Current Pain Scales:  Current: 5/10              Typical Range: 5-7/10     Interval History (10/10/2024):  Margareth Echeverria returns today for follow up.  At the last clinic visit, provided HEP for neck pain. Restart gabapentin 300 mg. Decrease Norco 5/325 mg to q 6 hrs PRN pain (#120 per month). This is 20 MME. Goal to be down to 3.5/d at next refill.     Currently, the neck pain is stable.    The decrease in hydrocodone has made her neck pain worse. Pain primarily bilateral parasipnal region with no radiatino  into the arms currentl.         Current Pain Scales:  Current: 8/10              Typical Range: 5-8/10     Current PEG Score: 5.33      Opioid Risk Score         Value Time User    Opioid Risk Score  7 6/8/2023 10:19 AM Katharine Steve MD           Previous Interventions:  - 8/26/24: C7-T1 IL ALEKSANDR w/ 75% relief  - 1/8/24: C7-T1 IL ALEKSANDR w/ 80% relief for > 6 months  - 8/16/23: left DANY and SHANELLE block in clinic with US guidance w/ 4 mg/mL dexamethasone and lido 1% which provided nearly complete relief.   - 6/23/23: B/L SIJ CSI w/ > 50% relief in pain and improved fxn.   - Procedures for neck and back pain, but not for pelvic pain    Previous Therapies:  PT/OT: yes for back pain but not pelvic floor  Relevant Surgery: yes    - surgery for trigeminal neuralgia  Previous Medications:   - NSAIDS:   - Muscle Relaxants: Zanaflex 2mg   - TCAs:   - SNRIs: cymbalta  - Topicals: CBD  - Anticonvulsants:  gabapentin made her feel drunk  - Opioids: hydrocodone    Current Pain Medications:  Cymbalta 30 mg  Norco 5/325mg   Tizanidine 2 mg   Gabapentin 300 mg   Baclofen 5-10    Blood Thinners: No    Full Medication List:    Current Outpatient Medications:     baclofen (LIORESAL) 10 MG tablet, Take 0.5-1 tablets (5-10 mg total) by mouth daily as needed (pain or spasm)., Disp: 30 tablet, Rfl: 2    COMPOUND HORMONE REPLACEMENT, Take by mouth once daily. Inject 1 pellet Q 3-4 months to hip, Disp: , Rfl:     cyclobenzaprine (FLEXERIL) 10 MG tablet, TAKE 1 TABLET BY MOUTH EVERY NIGHT AT BEDTIME TO PREVENT MUSCLE SPASMS, Disp: , Rfl:     DULoxetine (CYMBALTA) 30 MG capsule, Take 1 capsule (30 mg total) by mouth once daily., Disp: 90 capsule, Rfl: 1    lisdexamfetamine (VYVANSE) 20 MG capsule, Take 1 capsule (20 mg total) by mouth every morning., Disp: 30 capsule, Rfl: 0    nortriptyline (PAMELOR) 10 MG capsule, , Disp: , Rfl:     ondansetron (ZOFRAN) 4 MG tablet, Take 1 tablet (4 mg total) by mouth every 8 (eight) hours as needed for Nausea.,  "Disp: 30 tablet, Rfl: 0    tirzepatide 2.5 mg/0.5 mL PnIj, Inject 2.5 mg into the skin every 7 days., Disp: , Rfl:     tiZANidine (ZANAFLEX) 4 MG tablet, Take 1-2 tablets (4-8 mg total) by mouth nightly as needed (pain and sleep)., Disp: 60 tablet, Rfl: 5    gabapentin (NEURONTIN) 300 MG capsule, Take 1 capsule (300 mg total) by mouth 3 (three) times daily., Disp: 90 capsule, Rfl: 11    HYDROcodone-acetaminophen (NORCO) 5-325 mg per tablet, Take 1 tablet by mouth every 6 (six) hours as needed for Pain., Disp: 120 tablet, Rfl: 0     Review of Systems:  ROS    Allergies:  Opioids - morphine analogues and Morphine     Medical History:   has a past medical history of ADHD (attention deficit hyperactivity disorder), Adult hypophosphatasia, AV block, Fibromyalgia, IBS (irritable bowel syndrome), IC (interstitial cystitis), Occipital neuralgia, and Trigeminal neuralgia.    Surgical History:   has a past surgical history that includes Inguinal hernia repair (Bilateral); Laparoscopic cholecystectomy; Augmentation of breast; Fulguration of endometriosis (07/05/2016); Laparoscopy-assisted vaginal hysterectomy (11/11/2019); Laparoscopy (12/08/2019); Appendectomy; Cystoscopy with hydrodistension and biopsy of bladder (12/28/2020); Removal of breast implant (04/2021); Hysterectomy; and Breast surgery (Bilateral).    Family History:  family history includes Breast cancer in her maternal cousin; Lung cancer in her paternal grandmother; No Known Problems in her father and mother.    Social History:   reports that she quit smoking about 12 years ago. Her smoking use included cigarettes. She started smoking about 22 years ago. She has a 5 pack-year smoking history. She has never been exposed to tobacco smoke. She has never used smokeless tobacco. She reports current alcohol use. She reports current drug use. Drug: Marijuana.    Physical Exam:  /68   Pulse 75   Ht 5' 4" (1.626 m)   Wt 51.7 kg (114 lb)   SpO2 99%   BMI 19.57 " kg/m²   GEN: No acute distress. Calm, comfortable  HENT: Normocephalic, atraumatic, moist mucous membranes  EYE: Anicteric sclera, non-injected.   CV: Non-diaphoretic. Regular Rate. Radial Pulses 2+.  RESP: Breathing comfortably. Chest expansion symmetric.  EXT: No clubbing, cyanosis.   SKIN: Warm, & dry to palpation. No visible rashes or lesions of exposed skin.   PSYCH: Pleasant mood and appropriate affect. Recent and remote memory intact.   GAIT: Independent, normal ambulation  Neck Exam:       Inspection: No erythema, bruising.       Palpation: (+) TTP of left cervical paraspinals and over SHANELLE and DANY      ROM: No significant Limitation in flexion, extension, lateral bending or rotation      Provocative Maneuvers:  (-) Spurling's bilaterally  Lumbar Spine Exam:       Inspection: No bruising, swelling. Slight erythema noted at bilateral injection sites.        Palpation: No calor to the area. (+) TTP of b/l SIJ > lumbar paraspinals b/l. (+) TTP of sacrum and coccyx         ROM:  Limited in flexion, extension, lateral bending.       (+) Facet loading bilaterally      (-) Straight Leg Raise bilaterally      (+) ISABELLA bilaterally      (+) SIJ Compression Test bilaterally      (+) Gaenslan's Test bilaterally      (+) Thigh thrust/Posterior Pelvic Pain Provocation Test bilaterally      (+) Sacral thrust  Hip/Pelvis Exam:      Inspection: No gross deformity or apparent leg length discrepancy      Palpation: (+) TTP along inguinal ligaments b/l, pubic symphysis. (+) TTP right ischial bursa and b/l piriformis and gluteal muscles, (-) TTP to bilateral greater trochanteric bursas.       ROM:  No limitation or pain in internal rotation, external rotation b/l  Neurologic Exam:     Alert. Speech is fluent and appropriate.     Strength: 4/5 in left AbDM, wrist extension, 4/5 diffusely in the left leg, otherwise 5/5 throughout bilateral upper and lower extremities     Sensation:  Grossly intact to light touch in bilateral upper  and lower extremities     Reflexes: 2+ in b/l BR, biceps, triceps, patella, achilles     Tone: No abnormality appreciated in bilateral lower extremities     No Clonus     Downgoing toes on plantar stimulation     (-) Franco bilaterally          No idea what I because      Imaging:  - MRI C-spine 11/21/23:      - X-Ray Cervical Spine AP & Lateral 10/13/23     FINDINGS:  There is some reversal of the normal cervical lordosis.  Vertebral body heights are within normal limits.  No definite spondylolisthesis demonstrated.  Mild-to-moderate disc height loss noted at C5-6.  Mild disc height loss at C4-5. Posterior elements appear intact without acute fractures or subluxations demonstrated.  Odontoid process appears intact.  Atlantoaxial articulations appear normal.  Prevertebral soft tissues are within normal limits.  Postoperative changes are noted involving the skull on the left in the suboccipital region region.     Impression:     Degenerative findings as above    - MRI L-spine 6/13/23:  Vertebral body alignment is preserved. Vertebral body heights are maintained. Bone marrow signal is within normal limits. The conus medullaris terminates normally at the level of L2. Lumbar nerve roots have normal appearance. Preserved intervertebral disc space height and signal.    T12-L1: No significant canal or foraminal stenosis.    L1-L2: No significant canal or foraminal stenosis.    L2-L3: No significant canal or foraminal stenosis.    L3-L4: No significant canal or foraminal stenosis.    L4-L5: Broad-based disc bulge, ligamentum flavum thickening, facet hypertrophy with at most mild canal stenosis. Mild effacement of the lateral recesses. No significant foraminal stenosis.    L5-S1: No significant canal or foraminal stenosis.        - MRI Pelvis w.o 6/13/23:  The femoral heads are well formed and seated within well formed acetabula. Bone marrow signal is normal. The sacroiliac joints are intact. There is no muscle atrophy or  edema. There is no intrapelvic visceral abnormalities. Prior hysterectomy. There is no evidence of fluid collection. There is mild right hamstring origin tendinosis. The gluteus medius and minimus tendon insertions are intact. The hip adductors are intact. There is no evidence of iliopsoas or trochanteric bursitis.    Impression:    Mild right hamstring origin tendinosis. Otherwise, negative pelvic MRI.      - X-ray lumbar spine 6/8/23:  Transitional anatomy with sacralization of L5.  Vertebral body heights maintained.  No spondylolisthesis.  Disc spaces maintained.  Mild facet arthropathy.  No acute osseous abnormality.  Bilateral SI joint degenerative changes.  Constipation suspected.    - X-ray pelvis 6/8/23:  Hip joint spaces maintained.  Likely transitional anatomy with L5 sacralization.  Symmetric degenerative findings of the bilateral SI joints.  Constipation findings noted.    - Pelvis CT w/o contrast 1/6/23:  The visualized gastrointestinal tract appears normal.  Urinary bladder is normal.  Inguinal regions are normal.  No masses, fluid collections or adenopathy seen.  1. There is a cystic area on the left side of the pelvis measuring about 5.8 cm. This has a simple cystic appearance to it. Statistically this most likely represents a ovarian cyst. The patient's uterus has apparently beenremoved.  The bony elements appear intact.  Impression:  1. Cyst on the left side of the pelvis. Statistically most likely represents an ovarian cyst. Evaluation with pelvic ultrasound is recommended.    - Pelvic US 12/21/22:  Transabdominal and transvaginal pelvic ultrasound was performed by the sonographer. Static images are submitted. Uterus is not visualized consistent with patient's history of previous partial hysterectomy. Right  ovary is not visualized. Patient provides a history of previous right oophorectomy. Left ovary measures 3.4 x 3.9 x 2.9 cm in size. Multiple left ovarian follicles are visualized. Small left  ovarian cyst is visualized measuring 1.8 cm in diameter. Left ovarian blood flow is present. Trace amount of pelvic free fluid is present adjacent to the vaginal cuff.  IMPRESSION:  1. Nonvisualization of the uterus and right ovary consistent with patient's surgical history.  2. Small 1.8 cm left ovarian cyst.  3. Trace amount of pelvic free fluid.    - MRI C-spine 9/26/22:  FINDINGS:  Reversal of the normal cervical lordotic curvature. The vertebral body heights and alignment are maintained throughout the cervical spine. No abnormal vertebral body marrow signal. Multilevel intervertebral disc desiccation.  Spinal cord is normal in caliber and demonstrates normal signal. The visualized portions of the cervicomedullary junction are unremarkable.  C2-C3: Normal intervertebral disc contour. No central canal or neural foraminal narrowing.  C3-C4: Intervertebral disc bulge narrows the ventral subarachnoid space. No central canal or neural foraminal narrowing.  C4-C5: Intervertebral disc bulge eccentric to the right side. There is narrowing of the ventral subarachnoid space. The central canal is borderline measuring 10 mm. Moderate right and mild left neural foraminal narrowing.   C5-C6: Intervertebral disc bulge with superimposed left paracentral disc protrusion. There is narrowing of the ventral subarachnoid space and left subarticular recess. Bilateral uncovertebral and facet hypertrophy. Moderate bilateral neural foraminal narrowing. The central canal is narrowed to 8 mm.  C6-C7: Intervertebral disc bulge narrows the ventral subarachnoid space. The central canal is narrowed to 9 mm. No neural foraminal narrowing.  C7-T1: Normal intervertebral disc contour. No central canal or neural foraminal narrowing.  The paravertebral soft tissues are unremarkable.  IMPRESSION:  Multilevel cervical spondylosis most prominent at C4-7. The findings are not significantly changed when compared to the prior exam.    - MRI Lumbar spine  8/17/21:  FINDINGS:  Alignment: Normal.  Vertebral bodies: Normal height and marrow signal.  T12-L1: No significant spinal canal stenosis or neuroforaminal narrowing.  L1-L2: No significant spinal canal stenosis or neuroforaminal narrowing.  L2-L3: No significant spinal canal stenosis or neuroforaminal narrowing.  L3-L4: No significant spinal canal stenosis or neuroforaminal narrowing.  L4-L5: No significant spinal canal stenosis or neuroforaminal narrowing.  L5-S1: No significant spinal canal stenosis or neuroforaminal narrowing.  Spinal cord: The cord extends down to the L1 level. The cord has a normal size, configuration and signal pattern.    - MRI Thoracic spine w/o 6/15/20:  FINDINGS:  Vertebral bodies: Normal vertebral body height, alignment and marrow signal.  C7-T1: The disc is normal. The central canal and neural foramen appear normal. No displacement or compression of the neural elements are seen.  T1-T2: The disc has a normal contour. The central canal and neural foramen appear normal. No displacement or compression of the neural elements are seen.  T2-T3: The disc has a normal contour. The central canal and neural foramen appear normal. No displacement or compression of the neural elements are seen.  T3-T4: The disc has a normal contour. The central canal and neural foramen appear normal. No displacement or compression of the neural elements are seen.  T4-T5: The disc has a normal contour. The central canal and neural foramen appear normal. No displacement or compression of the neural elements are seen.  T5-T6: The disc has a normal contour. The central canal and neural foramen appear normal. No displacement or compression of the neural elements are seen.  T6-T7: The disc has a normal contour. The central canal and neural foramen appear normal. No displacement or compression of the neural elements are seen.  T7-T8: The disc has a normal contour. The central canal and neural foramen appear normal. No  displacement or compression of the neural elements are seen.  T8-T9: The disc has a normal contour. The central canal and neural foramen appear normal. No displacement or compression of the neural elements are seen.  T9-T10: The disc has a normal contour. The central canal and neural foramen appear normal. No displacement or compression of the neural elements are seen.  T10-T11: The disc has a normal contour. The central canal and neural foramen appear normal. No displacement or compression of the neural elements are seen.  T11-T12: The disc has a normal contour. The central canal and neural foramen appear normal. No displacement or compression of the neural elements are seen.  T12-L1: The disc has a normal contour. The central canal and neural foramen appear normal. No displacement or compression of the neural elements are seen.  Spinal cord: The cord has a normal size, configuration and signal pattern.  Additional findings: None seen.      Labs:  BMP  Lab Results   Component Value Date     08/16/2023    K 5.4 (H) 08/16/2023     08/16/2023    CO2 29 08/16/2023    BUN 22.2 (H) 08/16/2023    CREATININE 0.74 08/16/2023    CALCIUM 10.0 08/16/2023    ANIONGAP 9.0 08/16/2023     Lab Results   Component Value Date    AST 16 08/16/2023     Lab Results   Component Value Date     08/16/2023       Assessment:  Margareth Echeverria is a 44 y.o. female with the following diagnoses based on history, exam, and imaging:    Problem List Items Addressed This Visit    None  Visit Diagnoses       Chronic pain syndrome        Relevant Medications    gabapentin (NEURONTIN) 300 MG capsule    HYDROcodone-acetaminophen (NORCO) 5-325 mg per tablet    Long term (current) use of opiate analgesic        Relevant Medications    gabapentin (NEURONTIN) 300 MG capsule    HYDROcodone-acetaminophen (NORCO) 5-325 mg per tablet    Cervical radiculopathy        Relevant Medications    gabapentin (NEURONTIN) 300 MG capsule    Chronic neck  pain        Relevant Medications    gabapentin (NEURONTIN) 300 MG capsule                    This is a pleasant 44 y.o. lady presenting with:     - Neck pain and cervicogenic HA and left radicular arm pain. Appears to have occipital neuralgia on left which improved after occipital nerve blocks. She also has some myofascial pain on exam.    - Left radicular arm pain improved w/ OLEGARIO but has recurred.   - Patient had > 50% relief relief for > 3 months with noted improved function measured on PEG scale with prior ALEKSANDR.     - Patient with moderate to severe chronic neck pain that is predominantly axial with radicular pain that resolved with ALEKSANDR, but axial pain persists.. The pain is severe enough to greatly impact quality of life &/or function as evidenced on the patients PEG score and NRS.    - Pain persists for greater than 3 months despite conservative treatment, including: Activity modification (avoiding exacerbating factors), physical therapy, and oral medications, and HEP.    - Facet joints currently suspected as primary pain generator based on clinical assessment & radiology studies, as there is no fracture, tumor, infection, and significant deformity. There is NO surgical fusion (such as Anterior Lumbar Interbody Fusion) at the spinal segment to be targeted..   - This facet level has not undergone ablation in the previous 2 years.   - Chronic bilateral low back pain: Pain appears primarily due to SIJ dysfunction on exam, but may have facet and myofascial contributions as well.    - Back pain improved after bilateral SIJ CSI   - She does note some radiation into the legs in L4/5 distribution and MRI with mild canal stenosis at L4-5 with facet arthropathy  - Chronic pelvic pain: Pain appears multifactorial and she has a complex history with prior hysterectomy for endometriosis, interstitial cystitis, b/l oophorectomy for ovarian cyst, prior bilateral inguinal hernia repair and hypophosphatasia. Unclear exactly what  the cause of the pain is at this point as she has multiple areas of pain.    - SIJ CSI did help some of the anterior pelvis pain as well  - Nasal pain and deformity after fracture.   - History of fibromyalgia.   - History of familial hypophosphatasia and h/o polyarthralgia   - Comorbidities: Depression. ADHD. Hypophosphatasia. H/o Trigeminal neuralgia. IBS w/ constipation. H/o AV block. Former smoker. Allergy to opioids.     Treatment Plan:   - PT/OT/HEP: Provided HEP for neck pain.    - Discussed benefits of exercise for pain.   - Procedures: Schedule diagnostic medial branch block of the bilateral C3-4 & C4-5 facet joints under fluoroscopic guidance with local sedation. (CPT Codes 49442, 64662, KX modifier & 50 modifer). If MBB successful x 2, plan for RFA. The patient is not allergic to iodinated contrast. Patient is not currently taking blood thinners for CV prophylaxis.  - Can repeat C7-T1 IL ALEKSANDR PRN  - Consider left T6 & T7 intercostal nerve block  - Consider repeat b/l SIJ CSI if back pain returns/worsens  - Consider L4-5 IL ALEKSANDR for radicular leg pains   - Consider b/l superior hypogastric plexus block for chronic pelvic pain   - Consider ganglion impar and sacrococcygeal injection for coccydynia  - Medications:   - Cont Norco 5/325 mg q 6 hrs PRN pain (#120 per month). This is 20 MME.    - Goal to be down to 3.5/d at next refill. Going slow.   - Cont baclofen 5-10 mg daily PRN. Try to switch one hydrocodone pill for a baclofen tablet   - Cont tizanidine 4-8 mg qHS.    - Cont cymbalta 30 mg daily (didn't tolerate 60 mg)   - Increase gabapentin 300 mg to TID    - Pain contract signed 08/16/2023    -  reviewed  - Imaging: Reviewed.   - Labs: Reviewed.        Follow Up: RTC ASAP after MBBs or sooner PRN      Katharine Steve M.D.  Interventional Pain Medicine / Physical Medicine & Rehabilitation

## 2024-10-10 NOTE — PATIENT INSTRUCTIONS
Pre-Procedural Instructions  Interventional Pain  Medial Branch Block    Purpose:  We are performing diagnostic blocks of particular nerves in order to determine if performing a radiofrequency ablation (burning) of those nerves will provide relief of your pain.   The relief is temporary and used to determine the next step in your treatment  Please provide honest results in the amount of pain relief you obtained. Don't worry, you will not hurt our feelings.  We want to help relieve your pain and can potentially target different structures in order to provide you better relief.   Informed Consent:  These procedures are safe, but like any interventional procedure, it does carry rare risks which include:  Infection   Bleeding  Allergic Reactions  No relief of pain  Temporary sense of imbalance/dizziness can occur when performing cervical medial branch blocks, especially of the third occipital nerve.     Preparing for the procedure:    Tell your healthcare provider about all medicines you take. This includes over-the-counter medicines, herbs, vitamins, and other supplements.  Tell your healthcare provider about any other medical or dental procedures planned in proximity to your procedure.   Reduce Infection Risk:   Shower or bathe the night before and morning of your scheduled procedure.  Notify clinic if you are:  Having signs of illness, such as fevers, or signs of a urinary tract infection (UTI)  Prescribed antibiotics for an infection  Reduce bleeding risk:  For 7 days prior to procedure and during the duration of the trial (until your clinic follow-up):  Stop NSAIDs (ibuprofen/Advil, naproxen/Aleve, Meloxicam/Mobic, Goody's, or others)  Stop Demetria Britt, Pepto Bismol, & Herbal Medication/Supplements (such as Ginko, high dose Vitamin E, Fish Oil, Turmeric, Garlic, and others)  Home Support:  You MUST have a responsible  to bring you home from the facility and assist you at home on the day of the procedure    Plan to not be at work on the day of the procedure.   Medications:  Blood thinners: Such as: Aspirin, Plavix, Warfarin (Coumadin), Eliquis, Pradaxa, Xarelto, & others  If you are taking blood thinning medications, the clinic will notify you if you need to hold and of the number days to hold the medication prior to the procedure and when to restart these after the procedure. This will be communicated with and approved by your prescribing physician.   Please notify the office if you are taking blood thinning medications and are unsure if or when you need to hold the medication.   GLP-1 agonists: Semaglutide (Ozempic, Wegovy, Rybelsus), Tirzepatide (Mounjaro, Zepbound), Dulaglutide (Trulicity), Liraglutide, Lixisenatide, Exenatide  These medications can increase the risk of regurgitation and pulmonary aspiration of gastric contents during general anesthesia and deep sedation  If you take this weekly, please hold for 1 week prior to the procedure  If you take this daily, please hold on the day of procedure  If these are utilized for blood sugar control, you will need to discuss with your managing provider on what to utilize for bridging the antidiabetic therapy to maintain blood sugar control and avoiding hyperglycemia  Please take other regular medications as scheduled.  Diet:  If you will be receiving sedation for the procedure.  Do not eat anything for 8 hours prior to your procedure.   You may drink clear liquids up to 4 hours prior to your procedure.   Clear liquids include: water, black coffee, fruit juice without pulp, clear tea  You may drink water up to 2 hours prior to your procedure.  You may use a sip of water to take your regular medications  If you are NOT receiving sedation for the procedure:  Do not eat anything for 2 hours prior to your procedure. You may eat a light meal more than 2 hours prior to your procedure.   You may use a sip of water to take your regular medications  For Diabetic  Patients:  Please discuss with your managing physician the best way to take your medications on the procedure day to minimize large increases or decreases in your blood sugar  Please notify clinic of your most recent A1c and when that was performed. Optimizing your blood sugar control prior to the procedure will help decrease your risk of complications, such as infection.   Notify clinic and we will attempt to schedule your procedure as early in the morning as possible to minimize hypoglycemia that may occur while fasting for the procedure.  Please inform clinic if: Dr. Katharine Steve's clinic phone number is [phone]  You are pregnant or attempting to become pregnant  You have an implanted electronic device (pacemaker, defibrillator, medication pump, stimulator, etc.)  The electrical current utilized to coagulate the nerves can damage, disrupt or potentially cause a discharge of the electronic device.  You are having signs of infection noted above or are started on antibiotics.  You are allergic to iodinated contrast agents, local anesthetics, or steroids.  You are having other medical procedures around the time of your procedure (within 2 weeks)    Instructions for procedure day:    If you are not having any pain in the area that is going to be evaluated with the procedure, please call and cancel the procedure. The block will not provide valuable information if you are not having any pain.   We ask that you please leave your valuables at home  Avoid moisturizers or scented personal products  Wear loose fitting clothing that you can easily change in & out of  Plan to not be at work on the day of the procedure.   Please remove jewelry.  If you are having a procedure done on your head or neck, please remove any metallic items or hardware, such as dental hardware, jewelry that may obscure the images of the area during the procedure.     After the procedure: You will be sent to a recovery room after the procedure.  You will go home the same day.     Home Care Instructions after the procedure:    Injection site(s)  The injection sites may be covered with a dressing.  You may remove bandage at discharge from the hospital.   Bruising may be present  Avoid soaking or bathing in hot tub, bath or pool on the day of your procedure.   Showering is okay the day of procedure.   Avoid heat to the area  Notify the clinic if you are experiencing increased bleeding or drainage from the injection site(s)  Pain  Mild-moderate pain/discomfort may occur at the injection sites  Pain may be relieved with:  Ice  Tylenol (Acetaminophen)  Injection site pain typically improves after a day  Your baseline pain may improve immediately after the procedure:  Assess and note in your diary on a scale of 0 - 10/10, the intensity of pain at multiple time intervals on the day of your procedure.  Activity/Diet:  Day of the procedure:  Do NOT drive or operate heavy machinery  Resume daily life activities as tolerated. Do this slowly and carefully.  Bed rest is NOT recommended  Avoid strenuous activity, heavy lifting, bending or twisting.   Åvoid activity that requires skill, dexterity or coordination  As pain improves, you can carefully attempt activities that would exacerbate your pain and assess any functional benefits from the procedure.   If you received sedation - For 24 hrs following the procedure DO NOT:   Drive or operate heavy machinery  Drink alcohol  Make any important decisions  Dizziness, vertigo or balance difficulties may occur when having the procedure on the cervical spine (the neck). This happens because the procedure anesthetizes the proprioceptors (signals informing where you are in space)  This can be worsened when looking down or looking sideways  You can help compensate for this by utilizing your visual cues. Always focus on horizontal objects, such as window frames, door frames, or the horizon itself.  This typically improves within  15-30 minutes  Day after the procedure:   Resume daily life activities as tolerated: Let pain be your guide. Progress slowly and try not to over exert yourself if you are feeling good.   If possible, avoid activities that exacerbate your pain  Bed rest is NOT recommended   Physical Therapy (PT): You may restart your home exercise program the day following your procedure.  Diet: You may resume your normal diet as tolerated after the procedure  If nauseated, hold solid foods until nausea has resolved  Medications:  If you held any blood thinner medications for the procedure, you should have been given a specific timeline on when to restart the medication that has been determined in collaboration with your prescriber and our clinic. If you do not know when to restart, please notify clinic.  You may continue all other regular medications as prescribed.     When to call your healthcare provider [phone]  Call your healthcare provider if you have any of these symptoms:  Fever of 100.4°F (38.0°C) or higher. If you feel hot, take your temperature before notifying clinic.  New pain, weakness, tingling, or numbness in your legs. This may be expected in the first several hours after the procedure due to the local anesthetic used during the procedure.   New or worsening back pain   Redness, drainage or bleeding from site  Changes in bowel (incontinence) function  Changes in bladder (incontinence or retention) function  New or unusual headache &/or neck stiffness  Persistent nausea or vomiting with inability to tolerate clear liquids for more than 12 hours       When to seek medical attention  Call 911 right away if you have any of the following:  Chest pain  Shortness of breath  Signs of a stroke: Sudden changes in vision, changes in speech, sudden weakness in your face/arms/legs  Any other medical emergency     Follow-up: Post-procedure clinic appointment: ASAP after procedure to document benefit of procedure        Katharine Steve M.D.  [facility]  Phone: [phone]

## 2024-10-15 ENCOUNTER — TELEPHONE (OUTPATIENT)
Dept: PAIN MEDICINE | Facility: CLINIC | Age: 44
End: 2024-10-15
Payer: COMMERCIAL

## 2024-10-15 NOTE — TELEPHONE ENCOUNTER
----- Message from Teresa sent at 10/14/2024  1:27 PM CDT -----  Regarding: PA  Approved Auth# [id number] (10/21)

## 2024-10-21 ENCOUNTER — OUTSIDE PLACE OF SERVICE (OUTPATIENT)
Dept: PAIN MEDICINE | Facility: CLINIC | Age: 44
End: 2024-10-21

## 2024-10-21 DIAGNOSIS — M25.50 POLYARTHRALGIA: Primary | ICD-10-CM

## 2024-10-21 PROCEDURE — 64491 INJ PARAVERT F JNT C/T 2 LEV: CPT | Mod: 50,KX,, | Performed by: PHYSICAL MEDICINE & REHABILITATION

## 2024-10-21 PROCEDURE — 64490 INJ PARAVERT F JNT C/T 1 LEV: CPT | Mod: 50,KX,, | Performed by: PHYSICAL MEDICINE & REHABILITATION

## 2024-10-22 ENCOUNTER — OFFICE VISIT (OUTPATIENT)
Dept: PAIN MEDICINE | Facility: CLINIC | Age: 44
End: 2024-10-22
Payer: COMMERCIAL

## 2024-10-22 DIAGNOSIS — M47.812 CERVICAL SPONDYLOSIS: Primary | ICD-10-CM

## 2024-10-22 DIAGNOSIS — M54.81 CERVICO-OCCIPITAL NEURALGIA: ICD-10-CM

## 2024-10-22 DIAGNOSIS — G89.4 CHRONIC PAIN SYNDROME: ICD-10-CM

## 2024-10-22 DIAGNOSIS — Z79.891 LONG TERM (CURRENT) USE OF OPIATE ANALGESIC: ICD-10-CM

## 2024-10-22 DIAGNOSIS — M54.12 CERVICAL RADICULOPATHY: ICD-10-CM

## 2024-10-22 PROCEDURE — 99214 OFFICE O/P EST MOD 30 MIN: CPT | Mod: 95,,, | Performed by: PHYSICIAN ASSISTANT

## 2024-10-22 NOTE — PROGRESS NOTES
Pain Medicine  Telemedicine Virtual Visit    The patient location is: [place]  The chief complaint leading to consultation is: neck pain  Visit type: Virtual visit with synchronous audio and video  Total time spent with patient: 15 mins  Each patient to whom he or she provides medical services by telemedicine is:  (1) informed of the relationship between the physician and patient and the respective role of any other health care provider with respect to management of the patient; and (2) notified that he or she may decline to receive medical services by telemedicine and may withdraw from such care at any time.        Chief Complaint:   Chief Complaint   Patient presents with    Neck Pain       History of Present Illness: Margareth Echeverria is a 44 y.o. female referred by Dr. Nelda Thornton for Pelvic pain.      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 and she has a history of chronic pelvic pain and LBP with prior hysterectomy and b/l oophorectomy and she was feeling pretty good in regards to her chronic pelvic pain until Mother's day where she developed rather sudden increase in her pelvic pain with no clear inciting event  Location: involves her entire pelvis, but seems to be primarily localized over her sacrum area  Radiation: She feels some radiation into her anterior thighs  Timing: constant  Quality: Aching, Throbbing, Pounding, Deep, Stabbing, and nagging and swelling  Exacerbating Factors: Bowel movements, valsalva, intercourse, sitting, standing prolonged  Alleviating Factors: nothing  Associated Symptoms: She has neuropathy in her legs/feet from prior to this incident. She feels weakness in her legs. She has chronic constipation, but has been having daily BMs. She does note recent weight loss (states about 30 lbs). She does note night sweats. She denies fevers, urinary incontinence/change in function and bowel incontinence/change  in function    Nothing seems to be helping, she has tried heating, cold. She saw gynecology and was sent here and to pelvic floor rehab.     She denies aggravation with urination.     Severity: Currently: 8/10   Typical Range: 5-10/10     Exacerbation: 10/10     P = 8  E = 10  G = 10  Baseline PEG Score = 9.33    Interval History (08/16/2023):  She is mostly having HA pain over the left side of her scalp. Pain radiates from the occiput to her parietal region and is constant and has been present for days. Stress seems to be aggravating it. She states she has occipital neuralgia and typically gets injections of her occipital nerves which helps her HA. Pain has been worsening because she has been under a lot of stress. Her current physician managing her pain medicine, Dr. Anaya, has discharged her from clinic for no reason due to her having a procedure (SIJ CSI) with this clinic which helped her. She did nothing to breach her pain contract, but she has been having more difficulty with sleeping and worsening pain with all the stress he has been putting her through. She is prescribed hydrocodone/APAP 10/325 mg QID PRN and states she was not needing to take it as often until he discharged her out of the blue, just because she had her procedure done with this clinic instead of with the interventional pain physicians at the clinic that Dr. Anaya works.     Interval History (10/13/2023):  Neck pain: She is mostly having the pain in the left cervico-occipital region and this radiates into the left trapezius and down the left arm with numbness and tingling into the left hand. She denies any clear changes in strength recently. She denies changes in b/b function.     Interval History (07/16/2024):  Margareth Echeverria returns today for follow up.  At the last clinic visit, cont Norco 5/325 mg q 4-6 hrs PRN pain (5 tab/d or #150 per month), cont baclofen 5-10 mg daily PRN. Try to switch one hydrocodone pill for a baclofen tablet,  cont tizanidine 4-8 mg qHS, cont cymbalta 30 mg daily (didn't tolerate 60 mg) and was holding gabapentin 300 mg qHS for HA ppx and chronic pain.     Currently, the neck and back pain are stable.  She had a breast augmentation with a pectoralis muscle repair. She is having some persistent pain that is worst at night on the left side around mid-clavicular area around level of xiphoid/T6-7 distribution    Patient is currently taking Tizanidine, Baclofen, Cymbalta, and Norco which provides 75% relief.  She denies side effects.    Current Pain Scales:  Current: 5/10              Typical Range: 4-7/10     Interval History (08/15/2024):  Margareth Echeverria returns today for follow up.  At the last clinic visit, cont current med regimen.    Currently, the neck pain and HAs are getting worse since last week. Primarily in the left upper cervical, suboccipital region with radiation over the back of the head and into both shoulders and down the left arm into the thumb, index and middle fingers.  She has tried her HEP. She is not sleeping well and having trouble exercising with it.     She continues the hydrocodone 5/325 mg and not ready to taper yet but has been able to do 4.5 tablets on several days. She cont the tizanidine and baclofen which are helping.       Current Pain Scales:  Current: 8/10              Typical Range: 7-9/10    Interval History (09/10/2024):  Margareth Echeverria returns today for follow up.  At the last clinic visit,  Encouraged cont HEP for her SIJ pain and back pain, schedule C7-T1 IL ALEKSANDR, Cont Norco 5/325 mg q 4-6 hrs PRN pain, Cont baclofen 5-10 mg daily PRN, Cont tizanidine 4-8 mg qHS, Cont cymbalta 30 mg daily    C7-T1 IL ALEKSANDR provided 75% relief.     Currently, the neck pain is improved.  She did take a flight after the procedure and stood and hit her head while on the flight pretty hard which has re-exacerbated some of her pain, but still feels improved overall.    She cont to take hydrocodone but is  ready to decrease frequency. The gabapentin has been helping and wants to restart.     Current Pain Scales:  Current: 5/10              Typical Range: 5-7/10     Interval History (10/10/2024):  Margareth Echeverria returns today for follow up.  At the last clinic visit, provided HEP for neck pain. Restart gabapentin 300 mg. Decrease Norco 5/325 mg to q 6 hrs PRN pain (#120 per month). This is 20 MME. Goal to be down to 3.5/d at next refill.     Currently, the neck pain is stable.    The decrease in hydrocodone has made her neck pain worse. Pain primarily bilateral parasipnal region with no radiation into the arms currently.         Current Pain Scales:  Current: 8/10              Typical Range: 5-8/10     Interval History (10/22/2024):  Margareth Echeverria returns today for follow up.  At the last clinic visit, scheduled diagnostic MBB of bilateral C3-4 & C4-5    The bilateral C3-4 & C4-5 diagnostic MBB provided 80% relief (Pain scale went from 10/10 pre-procedure to post-procedure pain of 2/10 during anesthetic effect for 3-4 hrs following procedure).     Currently, the neck pain is still improved following the procedure. Denies any changes in bowel or bladder function. Denies any new weakness or new numbness since the most recent visit.     Current Pain Scales:  Current: 2/10              Typical Range: 2-10/10  Current PEG Score: 8      Opioid Risk Score         Value Time User    Opioid Risk Score  7 6/8/2023 10:19 AM Katharine Steve MD           Previous Interventions:  - 10/21/24: Bilateral C3-4 & C4-5 diagnostic MBB (#1/2) provided 80% relief   - 8/26/24: C7-T1 IL ALEKSANDR w/ 75% relief  - 1/8/24: C7-T1 IL ALEKSANDR w/ 80% relief for > 6 months  - 8/16/23: left DANY and SHANELLE block in clinic with US guidance w/ 4 mg/mL dexamethasone and lido 1% which provided nearly complete relief.   - 6/23/23: B/L SIJ CSI w/ > 50% relief in pain and improved fxn.   - Procedures for neck and back pain, but not for pelvic pain    Previous  Therapies:  PT/OT: yes for back pain but not pelvic floor  Relevant Surgery: yes    - surgery for trigeminal neuralgia  Previous Medications:   - NSAIDS:   - Muscle Relaxants: Zanaflex 2mg   - TCAs:   - SNRIs: cymbalta  - Topicals: CBD  - Anticonvulsants:  gabapentin made her feel drunk  - Opioids: hydrocodone    Current Pain Medications:  Cymbalta 30 mg  Norco 5/325mg   Tizanidine 2 mg   Gabapentin 300 mg   Baclofen 5-10    Blood Thinners: No    Full Medication List:    Current Outpatient Medications:     baclofen (LIORESAL) 10 MG tablet, Take 0.5-1 tablets (5-10 mg total) by mouth daily as needed (pain or spasm)., Disp: 30 tablet, Rfl: 2    COMPOUND HORMONE REPLACEMENT, Take by mouth once daily. Inject 1 pellet Q 3-4 months to hip, Disp: , Rfl:     cyclobenzaprine (FLEXERIL) 10 MG tablet, TAKE 1 TABLET BY MOUTH EVERY NIGHT AT BEDTIME TO PREVENT MUSCLE SPASMS, Disp: , Rfl:     DULoxetine (CYMBALTA) 30 MG capsule, Take 1 capsule (30 mg total) by mouth once daily., Disp: 90 capsule, Rfl: 1    gabapentin (NEURONTIN) 300 MG capsule, Take 1 capsule (300 mg total) by mouth 3 (three) times daily., Disp: 90 capsule, Rfl: 11    HYDROcodone-acetaminophen (NORCO) 5-325 mg per tablet, Take 1 tablet by mouth every 6 (six) hours as needed for Pain., Disp: 120 tablet, Rfl: 0    lisdexamfetamine (VYVANSE) 20 MG capsule, Take 1 capsule (20 mg total) by mouth every morning., Disp: 30 capsule, Rfl: 0    nortriptyline (PAMELOR) 10 MG capsule, , Disp: , Rfl:     ondansetron (ZOFRAN) 4 MG tablet, Take 1 tablet (4 mg total) by mouth every 8 (eight) hours as needed for Nausea., Disp: 30 tablet, Rfl: 0    tirzepatide 2.5 mg/0.5 mL PnIj, Inject 2.5 mg into the skin every 7 days., Disp: , Rfl:     tiZANidine (ZANAFLEX) 4 MG tablet, Take 1-2 tablets (4-8 mg total) by mouth nightly as needed (pain and sleep)., Disp: 60 tablet, Rfl: 5     Review of Systems:  ROS    Allergies:  Opioids - morphine analogues and Morphine     Medical History:   has  a past medical history of ADHD (attention deficit hyperactivity disorder), Adult hypophosphatasia, AV block, Fibromyalgia, IBS (irritable bowel syndrome), IC (interstitial cystitis), Occipital neuralgia, and Trigeminal neuralgia.    Surgical History:   has a past surgical history that includes Inguinal hernia repair (Bilateral); Laparoscopic cholecystectomy; Augmentation of breast; Fulguration of endometriosis (07/05/2016); Laparoscopy-assisted vaginal hysterectomy (11/11/2019); Laparoscopy (12/08/2019); Appendectomy; Cystoscopy with hydrodistension and biopsy of bladder (12/28/2020); Removal of breast implant (04/2021); Hysterectomy; and Breast surgery (Bilateral).    Family History:  family history includes Breast cancer in her maternal cousin; Lung cancer in her paternal grandmother; No Known Problems in her father and mother.    Social History:   reports that she quit smoking about 12 years ago. Her smoking use included cigarettes. She started smoking about 22 years ago. She has a 5 pack-year smoking history. She has never been exposed to tobacco smoke. She has never used smokeless tobacco. She reports current alcohol use. She reports current drug use. Drug: Marijuana.    Physical Exam:  There were no vitals taken for this visit.  Last in person exam  GEN: No acute distress. Calm, comfortable  HENT: Normocephalic, atraumatic, moist mucous membranes  EYE: Anicteric sclera, non-injected.   CV: Non-diaphoretic. Regular Rate. Radial Pulses 2+.  RESP: Breathing comfortably. Chest expansion symmetric.  EXT: No clubbing, cyanosis.   SKIN: Warm, & dry to palpation. No visible rashes or lesions of exposed skin.   PSYCH: Pleasant mood and appropriate affect. Recent and remote memory intact.   GAIT: Independent, normal ambulation  Neck Exam:       Inspection: No erythema, bruising.       Palpation: (+) TTP of left cervical paraspinals and over SHANELLE and DANY      ROM: No significant Limitation in flexion, extension, lateral  bending or rotation      Provocative Maneuvers:  (-) Spurling's bilaterally  Lumbar Spine Exam:       Inspection: No bruising, swelling. Slight erythema noted at bilateral injection sites.        Palpation: No calor to the area. (+) TTP of b/l SIJ > lumbar paraspinals b/l. (+) TTP of sacrum and coccyx         ROM:  Limited in flexion, extension, lateral bending.       (+) Facet loading bilaterally      (-) Straight Leg Raise bilaterally      (+) ISABELLA bilaterally      (+) SIJ Compression Test bilaterally      (+) Gaenslan's Test bilaterally      (+) Thigh thrust/Posterior Pelvic Pain Provocation Test bilaterally      (+) Sacral thrust  Hip/Pelvis Exam:      Inspection: No gross deformity or apparent leg length discrepancy      Palpation: (+) TTP along inguinal ligaments b/l, pubic symphysis. (+) TTP right ischial bursa and b/l piriformis and gluteal muscles, (-) TTP to bilateral greater trochanteric bursas.       ROM:  No limitation or pain in internal rotation, external rotation b/l  Neurologic Exam:     Alert. Speech is fluent and appropriate.     Strength: 4/5 in left AbDM, wrist extension, 4/5 diffusely in the left leg, otherwise 5/5 throughout bilateral upper and lower extremities     Sensation:  Grossly intact to light touch in bilateral upper and lower extremities     Reflexes: 2+ in b/l BR, biceps, triceps, patella, achilles     Tone: No abnormality appreciated in bilateral lower extremities     No Clonus     Downgoing toes on plantar stimulation     (-) Franco bilaterally           Imaging:  - MRI C-spine 11/21/23:      - X-Ray Cervical Spine AP & Lateral 10/13/23  There is some reversal of the normal cervical lordosis.  Vertebral body heights are within normal limits.  No definite spondylolisthesis demonstrated.  Mild-to-moderate disc height loss noted at C5-6.  Mild disc height loss at C4-5. Posterior elements appear intact without acute fractures or subluxations demonstrated.  Odontoid process appears  intact.  Atlantoaxial articulations appear normal.  Prevertebral soft tissues are within normal limits.  Postoperative changes are noted involving the skull on the left in the suboccipital region region.     Impression:     Degenerative findings as above    - MRI L-spine 6/13/23:  Vertebral body alignment is preserved. Vertebral body heights are maintained. Bone marrow signal is within normal limits. The conus medullaris terminates normally at the level of L2. Lumbar nerve roots have normal appearance. Preserved intervertebral disc space height and signal.    T12-L1: No significant canal or foraminal stenosis.    L1-L2: No significant canal or foraminal stenosis.    L2-L3: No significant canal or foraminal stenosis.    L3-L4: No significant canal or foraminal stenosis.    L4-L5: Broad-based disc bulge, ligamentum flavum thickening, facet hypertrophy with at most mild canal stenosis. Mild effacement of the lateral recesses. No significant foraminal stenosis.    L5-S1: No significant canal or foraminal stenosis.        - MRI Pelvis w.o 6/13/23:  The femoral heads are well formed and seated within well formed acetabula. Bone marrow signal is normal. The sacroiliac joints are intact. There is no muscle atrophy or edema. There is no intrapelvic visceral abnormalities. Prior hysterectomy. There is no evidence of fluid collection. There is mild right hamstring origin tendinosis. The gluteus medius and minimus tendon insertions are intact. The hip adductors are intact. There is no evidence of iliopsoas or trochanteric bursitis.    Impression:    Mild right hamstring origin tendinosis. Otherwise, negative pelvic MRI.      - X-ray lumbar spine 6/8/23:  Transitional anatomy with sacralization of L5.  Vertebral body heights maintained.  No spondylolisthesis.  Disc spaces maintained.  Mild facet arthropathy.  No acute osseous abnormality.  Bilateral SI joint degenerative changes.  Constipation suspected.    - X-ray pelvis  6/8/23:  Hip joint spaces maintained.  Likely transitional anatomy with L5 sacralization.  Symmetric degenerative findings of the bilateral SI joints.  Constipation findings noted.    - Pelvis CT w/o contrast 1/6/23:  The visualized gastrointestinal tract appears normal.  Urinary bladder is normal.  Inguinal regions are normal.  No masses, fluid collections or adenopathy seen.  1. There is a cystic area on the left side of the pelvis measuring about 5.8 cm. This has a simple cystic appearance to it. Statistically this most likely represents a ovarian cyst. The patient's uterus has apparently beenremoved.  The bony elements appear intact.  Impression:  1. Cyst on the left side of the pelvis. Statistically most likely represents an ovarian cyst. Evaluation with pelvic ultrasound is recommended.    - Pelvic US 12/21/22:  Transabdominal and transvaginal pelvic ultrasound was performed by the sonographer. Static images are submitted. Uterus is not visualized consistent with patient's history of previous partial hysterectomy. Right  ovary is not visualized. Patient provides a history of previous right oophorectomy. Left ovary measures 3.4 x 3.9 x 2.9 cm in size. Multiple left ovarian follicles are visualized. Small left ovarian cyst is visualized measuring 1.8 cm in diameter. Left ovarian blood flow is present. Trace amount of pelvic free fluid is present adjacent to the vaginal cuff.  IMPRESSION:  1. Nonvisualization of the uterus and right ovary consistent with patient's surgical history.  2. Small 1.8 cm left ovarian cyst.  3. Trace amount of pelvic free fluid.    - MRI C-spine 9/26/22:  Reversal of the normal cervical lordotic curvature. The vertebral body heights and alignment are maintained throughout the cervical spine. No abnormal vertebral body marrow signal. Multilevel intervertebral disc desiccation.  Spinal cord is normal in caliber and demonstrates normal signal. The visualized portions of the  cervicomedullary junction are unremarkable.  C2-C3: Normal intervertebral disc contour. No central canal or neural foraminal narrowing.  C3-C4: Intervertebral disc bulge narrows the ventral subarachnoid space. No central canal or neural foraminal narrowing.  C4-C5: Intervertebral disc bulge eccentric to the right side. There is narrowing of the ventral subarachnoid space. The central canal is borderline measuring 10 mm. Moderate right and mild left neural foraminal narrowing.   C5-C6: Intervertebral disc bulge with superimposed left paracentral disc protrusion. There is narrowing of the ventral subarachnoid space and left subarticular recess. Bilateral uncovertebral and facet hypertrophy. Moderate bilateral neural foraminal narrowing. The central canal is narrowed to 8 mm.  C6-C7: Intervertebral disc bulge narrows the ventral subarachnoid space. The central canal is narrowed to 9 mm. No neural foraminal narrowing.  C7-T1: Normal intervertebral disc contour. No central canal or neural foraminal narrowing.  The paravertebral soft tissues are unremarkable.  IMPRESSION:  Multilevel cervical spondylosis most prominent at C4-7. The findings are not significantly changed when compared to the prior exam.    - MRI Lumbar spine 8/17/21:  Alignment: Normal.  Vertebral bodies: Normal height and marrow signal.  T12-L1: No significant spinal canal stenosis or neuroforaminal narrowing.  L1-L2: No significant spinal canal stenosis or neuroforaminal narrowing.  L2-L3: No significant spinal canal stenosis or neuroforaminal narrowing.  L3-L4: No significant spinal canal stenosis or neuroforaminal narrowing.  L4-L5: No significant spinal canal stenosis or neuroforaminal narrowing.  L5-S1: No significant spinal canal stenosis or neuroforaminal narrowing.  Spinal cord: The cord extends down to the L1 level. The cord has a normal size, configuration and signal pattern.    - MRI Thoracic spine w/o 6/15/20:  FINDINGS:  Vertebral bodies:  Normal vertebral body height, alignment and marrow signal.  C7-T1: The disc is normal. The central canal and neural foramen appear normal. No displacement or compression of the neural elements are seen.  T1-T2: The disc has a normal contour. The central canal and neural foramen appear normal. No displacement or compression of the neural elements are seen.  T2-T3: The disc has a normal contour. The central canal and neural foramen appear normal. No displacement or compression of the neural elements are seen.  T3-T4: The disc has a normal contour. The central canal and neural foramen appear normal. No displacement or compression of the neural elements are seen.  T4-T5: The disc has a normal contour. The central canal and neural foramen appear normal. No displacement or compression of the neural elements are seen.  T5-T6: The disc has a normal contour. The central canal and neural foramen appear normal. No displacement or compression of the neural elements are seen.  T6-T7: The disc has a normal contour. The central canal and neural foramen appear normal. No displacement or compression of the neural elements are seen.  T7-T8: The disc has a normal contour. The central canal and neural foramen appear normal. No displacement or compression of the neural elements are seen.  T8-T9: The disc has a normal contour. The central canal and neural foramen appear normal. No displacement or compression of the neural elements are seen.  T9-T10: The disc has a normal contour. The central canal and neural foramen appear normal. No displacement or compression of the neural elements are seen.  T10-T11: The disc has a normal contour. The central canal and neural foramen appear normal. No displacement or compression of the neural elements are seen.  T11-T12: The disc has a normal contour. The central canal and neural foramen appear normal. No displacement or compression of the neural elements are seen.  T12-L1: The disc has a normal  contour. The central canal and neural foramen appear normal. No displacement or compression of the neural elements are seen.  Spinal cord: The cord has a normal size, configuration and signal pattern.  Additional findings: None seen.      Labs:  BMP  Lab Results   Component Value Date     08/16/2023    K 5.4 (H) 08/16/2023     08/16/2023    CO2 29 08/16/2023    BUN 22.2 (H) 08/16/2023    CREATININE 0.74 08/16/2023    CALCIUM 10.0 08/16/2023    ANIONGAP 9.0 08/16/2023     Lab Results   Component Value Date    AST 16 08/16/2023     Lab Results   Component Value Date     08/16/2023       Assessment:  Margareth Echeverria is a 44 y.o. female with the following diagnoses based on history, exam, and imaging:    Problem List Items Addressed This Visit    None  Visit Diagnoses       Cervical spondylosis    -  Primary    Chronic pain syndrome        Long term (current) use of opiate analgesic        Cervical radiculopathy        Cervico-occipital neuralgia                          This is a pleasant 44 y.o. lady presenting with:     - Neck pain and cervicogenic HA and left radicular arm pain. Appears to have occipital neuralgia on left which improved after occipital nerve blocks. She also has some myofascial pain on exam.    - Left radicular arm pain improved w/ OLEGARIO    - Patient had > 50% relief relief for > 3 months with noted improved function measured on PEG scale with prior ALEKSANDR.     - Patient with moderate to severe chronic neck pain that is predominantly axial with radicular pain that resolved with ALEKSANDR, but axial pain persists.. The pain is severe enough to greatly impact quality of life &/or function as evidenced on the patients PEG score and NRS.    - Pain persists for greater than 3 months despite conservative treatment, including: Activity modification (avoiding exacerbating factors), physical therapy, and oral medications, and HEP.    - Facet joints currently suspected as primary pain generator  based on clinical assessment & radiology studies, as there is no fracture, tumor, infection, and significant deformity. There is NO surgical fusion (such as Anterior Lumbar Interbody Fusion) at the spinal segment to be targeted..   - This facet level has not undergone ablation in the previous 2 years.    - Patient reports greater than or equal to 80% relief from previous MBB at this segment with preprocedure pain score noted as 10/10 and post-procedure pain score noted as 2/10 with consistent relief for duration of local anesthetic (3-4 hrs).   - Chronic bilateral low back pain: Pain appears primarily due to SIJ dysfunction on exam, but may have facet and myofascial contributions as well.    - Back pain improved after bilateral SIJ CSI   - She does note some radiation into the legs in L4/5 distribution and MRI with mild canal stenosis at L4-5 with facet arthropathy  - Chronic pelvic pain: Pain appears multifactorial and she has a complex history with prior hysterectomy for endometriosis, interstitial cystitis, b/l oophorectomy for ovarian cyst, prior bilateral inguinal hernia repair and hypophosphatasia. Unclear exactly what the cause of the pain is at this point as she has multiple areas of pain.    - SIJ CSI did help some of the anterior pelvis pain as well  - Nasal pain and deformity after fracture.   - History of fibromyalgia.   - History of familial hypophosphatasia and h/o polyarthralgia   - Comorbidities: Depression. ADHD. Hypophosphatasia. H/o Trigeminal neuralgia. IBS w/ constipation. H/o AV block. Former smoker. Allergy to opioids.     Treatment Plan:   - PT/OT/HEP: Provided HEP for neck pain.    - Discussed benefits of exercise for pain.   - Procedures: If pain returns, can schedule diagnostic medial branch block (#2/2) of the bilateral C3-4 & C4-5 facet joints under fluoroscopic guidance with local sedation. (CPT Codes 59754, 32546, KX modifier & 50 modifer). If MBB successful x 2, plan for RFA. The  patient is not allergic to iodinated contrast. Patient is not currently taking blood thinners for CV prophylaxis.  - Can repeat C7-T1 IL ALEKSANDR PRN  - Consider left T6 & T7 intercostal nerve block  - Consider repeat b/l SIJ CSI if back pain returns/worsens  - Consider L4-5 IL ALEKSANDR for radicular leg pains   - Consider b/l superior hypogastric plexus block for chronic pelvic pain   - Consider ganglion impar and sacrococcygeal injection for coccydynia  - Medications:   - Cont Norco 5/325 mg q 6 hrs PRN pain (#120 per month). This is 20 MME.    - Goal to be down to 3.5/d at next refill. Going slow.   - Cont baclofen 5-10 mg daily PRN. Try to switch one hydrocodone pill for a baclofen tablet   - Cont tizanidine 4-8 mg qHS.    - Cont cymbalta 30 mg daily (didn't tolerate 60 mg)   - Cont gabapentin 300 mg to TID    - Pain contract signed 08/16/2023    -  reviewed  - Imaging: Reviewed.   - Labs: Reviewed.        Follow Up: RTC as scheduled or sooner PRN.    Ashli Cates PA-C  Interventional Pain Medicine

## 2024-10-25 ENCOUNTER — PATIENT MESSAGE (OUTPATIENT)
Dept: PAIN MEDICINE | Facility: CLINIC | Age: 44
End: 2024-10-25
Payer: COMMERCIAL

## 2024-11-08 ENCOUNTER — PATIENT MESSAGE (OUTPATIENT)
Dept: PAIN MEDICINE | Facility: CLINIC | Age: 44
End: 2024-11-08
Payer: COMMERCIAL

## 2024-11-12 ENCOUNTER — OFFICE VISIT (OUTPATIENT)
Dept: PAIN MEDICINE | Facility: CLINIC | Age: 44
End: 2024-11-12
Payer: COMMERCIAL

## 2024-11-12 ENCOUNTER — PATIENT MESSAGE (OUTPATIENT)
Dept: PAIN MEDICINE | Facility: CLINIC | Age: 44
End: 2024-11-12

## 2024-11-12 VITALS
HEIGHT: 64 IN | DIASTOLIC BLOOD PRESSURE: 76 MMHG | BODY MASS INDEX: 19.46 KG/M2 | WEIGHT: 114 LBS | HEART RATE: 73 BPM | SYSTOLIC BLOOD PRESSURE: 112 MMHG

## 2024-11-12 DIAGNOSIS — M53.3 NONTRAUMATIC COCCYDYNIA: Primary | ICD-10-CM

## 2024-11-12 DIAGNOSIS — G89.29 CHRONIC NECK PAIN: ICD-10-CM

## 2024-11-12 DIAGNOSIS — M53.3 SACROILIAC JOINT DYSFUNCTION: ICD-10-CM

## 2024-11-12 DIAGNOSIS — G89.4 CHRONIC PAIN SYNDROME: ICD-10-CM

## 2024-11-12 DIAGNOSIS — M47.812 CERVICAL SPONDYLOSIS: ICD-10-CM

## 2024-11-12 DIAGNOSIS — M54.2 CHRONIC NECK PAIN: ICD-10-CM

## 2024-11-12 DIAGNOSIS — M54.81 CERVICO-OCCIPITAL NEURALGIA: ICD-10-CM

## 2024-11-12 DIAGNOSIS — M53.3 COCCYDYNIA: Primary | ICD-10-CM

## 2024-11-12 DIAGNOSIS — Z79.891 LONG TERM (CURRENT) USE OF OPIATE ANALGESIC: ICD-10-CM

## 2024-11-12 RX ORDER — HYDROCODONE BITARTRATE AND ACETAMINOPHEN 5; 325 MG/1; MG/1
1 TABLET ORAL EVERY 6 HOURS PRN
Qty: 120 TABLET | Refills: 0 | Status: SHIPPED | OUTPATIENT
Start: 2024-11-12 | End: 2024-12-12

## 2024-11-12 RX ORDER — PREGABALIN 75 MG/1
75 CAPSULE ORAL 2 TIMES DAILY
Qty: 60 CAPSULE | Refills: 5 | Status: SHIPPED | OUTPATIENT
Start: 2024-11-12 | End: 2025-05-13

## 2024-11-12 RX ORDER — TIZANIDINE HYDROCHLORIDE 4 MG/1
CAPSULE, GELATIN COATED ORAL
COMMUNITY
Start: 2024-10-22 | End: 2024-11-12 | Stop reason: SDUPTHER

## 2024-11-12 NOTE — PROGRESS NOTES
Pain Medicine      Chief Complaint:   Chief Complaint   Patient presents with    Neck Pain       History of Present Illness: Margareth Echeverria is a 44 y.o. female referred by Dr. Nelda Thornton for Pelvic pain.      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 and she has a history of chronic pelvic pain and LBP with prior hysterectomy and b/l oophorectomy and she was feeling pretty good in regards to her chronic pelvic pain until Mother's day where she developed rather sudden increase in her pelvic pain with no clear inciting event  Location: involves her entire pelvis, but seems to be primarily localized over her sacrum area  Radiation: She feels some radiation into her anterior thighs  Timing: constant  Quality: Aching, Throbbing, Pounding, Deep, Stabbing, and nagging and swelling  Exacerbating Factors: Bowel movements, valsalva, intercourse, sitting, standing prolonged  Alleviating Factors: nothing  Associated Symptoms: She has neuropathy in her legs/feet from prior to this incident. She feels weakness in her legs. She has chronic constipation, but has been having daily BMs. She does note recent weight loss (states about 30 lbs). She does note night sweats. She denies fevers, urinary incontinence/change in function and bowel incontinence/change in function    Nothing seems to be helping, she has tried heating, cold. She saw gynecology and was sent here and to pelvic floor rehab.     She denies aggravation with urination.     Severity: Currently: 8/10   Typical Range: 5-10/10     Exacerbation: 10/10     P = 8  E = 10  G = 10  Baseline PEG Score = 9.33    Interval History (08/16/2023):  She is mostly having HA pain over the left side of her scalp. Pain radiates from the occiput to her parietal region and is constant and has been present for days. Stress seems to be aggravating it. She states she has occipital neuralgia and typically gets  injections of her occipital nerves which helps her HA. Pain has been worsening because she has been under a lot of stress. Her current physician managing her pain medicine, Dr. Anaya, has discharged her from clinic for no reason due to her having a procedure (SIJ CSI) with this clinic which helped her. She did nothing to breach her pain contract, but she has been having more difficulty with sleeping and worsening pain with all the stress he has been putting her through. She is prescribed hydrocodone/APAP 10/325 mg QID PRN and states she was not needing to take it as often until he discharged her out of the blue, just because she had her procedure done with this clinic instead of with the interventional pain physicians at the clinic that Dr. Anaya works.     Interval History (10/13/2023):  Neck pain: She is mostly having the pain in the left cervico-occipital region and this radiates into the left trapezius and down the left arm with numbness and tingling into the left hand. She denies any clear changes in strength recently. She denies changes in b/b function.       Interval History (08/15/2024):  Currently, the neck pain and HAs are getting worse since last week. Primarily in the left upper cervical, suboccipital region with radiation over the back of the head and into both shoulders and down the left arm into the thumb, index and middle fingers.  She has tried her HEP. She is not sleeping well and having trouble exercising with it.       Interval History (11/12/2024):  Margareth Echeverria returns today for follow up.  At the last clinic visit, Provided HEP for neck pain, Cont Norco 5/325 mg q 6 hrs PRN pain (#120 per month), Cont baclofen 5-10 mg daily PRN, Cont tizanidine 4-8 mg qHS, Cont cymbalta 30 mg daily (didn't tolerate 60 mg), Cont gabapentin 300 mg to TID     Currently, the neck pain is worse. Pain not bilateral currently and just on the left and more in occipital region with no radiation at this time down  the arm.      She also notes a recurrence of her tailbone pain.  She has chronic tailbone pain that has been present for years, but fell several weeks ago out of a rolling chair onto her tailbone with exacerbation of pain.  She is now severely limited in her ability to sit or stand.  This is affecting her ability to work.    She is doing okay with the hydrocodone taper.  She is past her refill date, and because she has not taken her medications she feels like her anxiety and pain are worsened, along with her ability to sleep.    Current Pain Scales:  Current: 8/10              Typical Range: 5-10/10     Current PEG Score: 6.67      Opioid Risk Score         Value Time User    Opioid Risk Score  7 6/8/2023 10:19 AM Katharine Steve MD           Previous Interventions:  - 10/21/24: Bilateral C3-4 & C4-5 diagnostic MBB (#1/2) provided 80% relief (Pain scale went from 10/10 pre-procedure to post-procedure pain of 2/10 during anesthetic effect for 3-4 hrs following procedure).   - 8/26/24: C7-T1 IL ALEKSANDR w/ 75% relief of arm pain   - 1/8/24: C7-T1 IL ALEKSANDR w/ 80% relief for > 6 months  - 8/16/23: left DANY and SHANELLE block in clinic with US guidance w/ 4 mg/mL dexamethasone and lido 1% which provided nearly complete relief.   - 6/23/23: B/L SIJ CSI w/ > 50% relief in pain and improved fxn.   - Procedures for neck and back pain, but not for pelvic pain    Previous Therapies:  PT/OT: yes for back pain but not pelvic floor  Relevant Surgery: yes    - surgery for trigeminal neuralgia  Previous Medications:   - NSAIDS:   - Muscle Relaxants: Zanaflex 2mg   - TCAs:   - SNRIs: cymbalta  - Topicals: CBD  - Anticonvulsants:  gabapentin made her feel drunk  - Opioids: hydrocodone    Current Pain Medications:  Cymbalta 30 mg  Norco 5/325mg   Tizanidine 2 mg   Gabapentin 300 mg   Baclofen 5-10    Blood Thinners: No    Full Medication List:    Current Outpatient Medications:     baclofen (LIORESAL) 10 MG tablet, Take 0.5-1 tablets (5-10 mg  total) by mouth daily as needed (pain or spasm)., Disp: 30 tablet, Rfl: 2    COMPOUND HORMONE REPLACEMENT, Take by mouth once daily. Inject 1 pellet Q 3-4 months to hip, Disp: , Rfl:     DULoxetine (CYMBALTA) 30 MG capsule, Take 1 capsule (30 mg total) by mouth once daily., Disp: 90 capsule, Rfl: 1    lisdexamfetamine (VYVANSE) 20 MG capsule, Take 1 capsule (20 mg total) by mouth every morning., Disp: 30 capsule, Rfl: 0    nortriptyline (PAMELOR) 10 MG capsule, , Disp: , Rfl:     ondansetron (ZOFRAN) 4 MG tablet, Take 1 tablet (4 mg total) by mouth every 8 (eight) hours as needed for Nausea., Disp: 30 tablet, Rfl: 0    tiZANidine (ZANAFLEX) 4 MG tablet, Take 1-2 tablets (4-8 mg total) by mouth nightly as needed (pain and sleep)., Disp: 60 tablet, Rfl: 5    HYDROcodone-acetaminophen (NORCO) 5-325 mg per tablet, Take 1 tablet by mouth every 6 (six) hours as needed for Pain., Disp: 120 tablet, Rfl: 0    pregabalin (LYRICA) 75 MG capsule, Take 1 capsule (75 mg total) by mouth 2 (two) times daily., Disp: 60 capsule, Rfl: 5     Review of Systems:  ROS    Allergies:  Opioids - morphine analogues, Morphine, and Tramadol     Medical History:   has a past medical history of ADHD (attention deficit hyperactivity disorder), Adult hypophosphatasia, AV block, Fibromyalgia, IBS (irritable bowel syndrome), IC (interstitial cystitis), Occipital neuralgia, and Trigeminal neuralgia.    Surgical History:   has a past surgical history that includes Inguinal hernia repair (Bilateral); Laparoscopic cholecystectomy; Augmentation of breast; Fulguration of endometriosis (07/05/2016); Laparoscopy-assisted vaginal hysterectomy (11/11/2019); Laparoscopy (12/08/2019); Appendectomy; Cystoscopy with hydrodistension and biopsy of bladder (12/28/2020); Removal of breast implant (04/2021); Hysterectomy; and Breast surgery (Bilateral).    Family History:  family history includes Breast cancer in her maternal cousin; Lung cancer in her paternal  "grandmother; No Known Problems in her father and mother.    Social History:   reports that she quit smoking about 12 years ago. Her smoking use included cigarettes. She started smoking about 22 years ago. She has a 5 pack-year smoking history. She has never been exposed to tobacco smoke. She has never used smokeless tobacco. She reports current alcohol use. She reports current drug use. Drug: Marijuana.    Physical Exam:  /76   Pulse 73   Ht 5' 4" (1.626 m)   Wt 51.7 kg (113 lb 15.7 oz)   BMI 19.56 kg/m²   Last in person exam  GEN: No acute distress. Calm, comfortable  HENT: Normocephalic, atraumatic, moist mucous membranes  EYE: Anicteric sclera, non-injected.   CV: Non-diaphoretic. Regular Rate. Radial Pulses 2+.  RESP: Breathing comfortably. Chest expansion symmetric.  EXT: No clubbing, cyanosis.   SKIN: Warm, & dry to palpation. No visible rashes or lesions of exposed skin.   PSYCH: Pleasant mood and appropriate affect. Recent and remote memory intact.   GAIT: Independent, normal ambulation  Neck Exam:       Inspection: No erythema, bruising.       Palpation: (+) TTP of left cervical paraspinals and over SHANELLE and DANY      ROM: No significant Limitation in flexion, extension, lateral bending or rotation      Provocative Maneuvers:  (-) Spurling's bilaterally  Lumbar Spine Exam:       Inspection: No bruising, swelling. Slight erythema noted at bilateral injection sites.        Palpation: No calor to the area. (+) TTP of b/l SIJ > lumbar paraspinals b/l. (+) TTP of sacrum and coccyx         ROM:  Limited in flexion, extension, lateral bending.       (+) Facet loading bilaterally      (-) Straight Leg Raise bilaterally      (+) ISABELLA bilaterally      (+) SIJ Compression Test bilaterally      (+) Gaenslan's Test bilaterally      (+) Thigh thrust/Posterior Pelvic Pain Provocation Test bilaterally      (+) Sacral thrust  Hip/Pelvis Exam:      Inspection: No gross deformity or apparent leg length discrepancy    "   Palpation: (+) TTP along inguinal ligaments b/l, pubic symphysis. (+) TTP right ischial bursa and b/l piriformis and gluteal muscles, (-) TTP to bilateral greater trochanteric bursas.       ROM:  No limitation or pain in internal rotation, external rotation b/l  Neurologic Exam:     Alert. Speech is fluent and appropriate.     Strength: 4/5 in left AbDM, wrist extension, 4/5 diffusely in the left leg, otherwise 5/5 throughout bilateral upper and lower extremities     Sensation:  Grossly intact to light touch in bilateral upper and lower extremities     Reflexes: 2+ in b/l BR, biceps, triceps, patella, achilles     Tone: No abnormality appreciated in bilateral lower extremities     No Clonus     Downgoing toes on plantar stimulation     (-) Franco bilaterally           Imaging:  - MRI C-spine 11/21/23:      - X-Ray Cervical Spine AP & Lateral 10/13/23  There is some reversal of the normal cervical lordosis.  Vertebral body heights are within normal limits.  No definite spondylolisthesis demonstrated.  Mild-to-moderate disc height loss noted at C5-6.  Mild disc height loss at C4-5. Posterior elements appear intact without acute fractures or subluxations demonstrated.  Odontoid process appears intact.  Atlantoaxial articulations appear normal.  Prevertebral soft tissues are within normal limits.  Postoperative changes are noted involving the skull on the left in the suboccipital region region.     Impression:     Degenerative findings as above    - MRI L-spine 6/13/23:  Vertebral body alignment is preserved. Vertebral body heights are maintained. Bone marrow signal is within normal limits. The conus medullaris terminates normally at the level of L2. Lumbar nerve roots have normal appearance. Preserved intervertebral disc space height and signal.    T12-L1: No significant canal or foraminal stenosis.    L1-L2: No significant canal or foraminal stenosis.    L2-L3: No significant canal or foraminal stenosis.    L3-L4:  No significant canal or foraminal stenosis.    L4-L5: Broad-based disc bulge, ligamentum flavum thickening, facet hypertrophy with at most mild canal stenosis. Mild effacement of the lateral recesses. No significant foraminal stenosis.    L5-S1: No significant canal or foraminal stenosis.        - MRI Pelvis w.o 6/13/23:  The femoral heads are well formed and seated within well formed acetabula. Bone marrow signal is normal. The sacroiliac joints are intact. There is no muscle atrophy or edema. There is no intrapelvic visceral abnormalities. Prior hysterectomy. There is no evidence of fluid collection. There is mild right hamstring origin tendinosis. The gluteus medius and minimus tendon insertions are intact. The hip adductors are intact. There is no evidence of iliopsoas or trochanteric bursitis.    Impression:    Mild right hamstring origin tendinosis. Otherwise, negative pelvic MRI.      - X-ray lumbar spine 6/8/23:  Transitional anatomy with sacralization of L5.  Vertebral body heights maintained.  No spondylolisthesis.  Disc spaces maintained.  Mild facet arthropathy.  No acute osseous abnormality.  Bilateral SI joint degenerative changes.  Constipation suspected.    - X-ray pelvis 6/8/23:  Hip joint spaces maintained.  Likely transitional anatomy with L5 sacralization.  Symmetric degenerative findings of the bilateral SI joints.  Constipation findings noted.    - Pelvis CT w/o contrast 1/6/23:  The visualized gastrointestinal tract appears normal.  Urinary bladder is normal.  Inguinal regions are normal.  No masses, fluid collections or adenopathy seen.  1. There is a cystic area on the left side of the pelvis measuring about 5.8 cm. This has a simple cystic appearance to it. Statistically this most likely represents a ovarian cyst. The patient's uterus has apparently beenremoved.  The bony elements appear intact.  Impression:  1. Cyst on the left side of the pelvis. Statistically most likely represents an  ovarian cyst. Evaluation with pelvic ultrasound is recommended.    - Pelvic US 12/21/22:  Transabdominal and transvaginal pelvic ultrasound was performed by the sonographer. Static images are submitted. Uterus is not visualized consistent with patient's history of previous partial hysterectomy. Right  ovary is not visualized. Patient provides a history of previous right oophorectomy. Left ovary measures 3.4 x 3.9 x 2.9 cm in size. Multiple left ovarian follicles are visualized. Small left ovarian cyst is visualized measuring 1.8 cm in diameter. Left ovarian blood flow is present. Trace amount of pelvic free fluid is present adjacent to the vaginal cuff.  IMPRESSION:  1. Nonvisualization of the uterus and right ovary consistent with patient's surgical history.  2. Small 1.8 cm left ovarian cyst.  3. Trace amount of pelvic free fluid.    - MRI C-spine 9/26/22:  Reversal of the normal cervical lordotic curvature. The vertebral body heights and alignment are maintained throughout the cervical spine. No abnormal vertebral body marrow signal. Multilevel intervertebral disc desiccation.  Spinal cord is normal in caliber and demonstrates normal signal. The visualized portions of the cervicomedullary junction are unremarkable.  C2-C3: Normal intervertebral disc contour. No central canal or neural foraminal narrowing.  C3-C4: Intervertebral disc bulge narrows the ventral subarachnoid space. No central canal or neural foraminal narrowing.  C4-C5: Intervertebral disc bulge eccentric to the right side. There is narrowing of the ventral subarachnoid space. The central canal is borderline measuring 10 mm. Moderate right and mild left neural foraminal narrowing.   C5-C6: Intervertebral disc bulge with superimposed left paracentral disc protrusion. There is narrowing of the ventral subarachnoid space and left subarticular recess. Bilateral uncovertebral and facet hypertrophy. Moderate bilateral neural foraminal narrowing. The  central canal is narrowed to 8 mm.  C6-C7: Intervertebral disc bulge narrows the ventral subarachnoid space. The central canal is narrowed to 9 mm. No neural foraminal narrowing.  C7-T1: Normal intervertebral disc contour. No central canal or neural foraminal narrowing.  The paravertebral soft tissues are unremarkable.  IMPRESSION:  Multilevel cervical spondylosis most prominent at C4-7. The findings are not significantly changed when compared to the prior exam.    - MRI Lumbar spine 8/17/21:  Alignment: Normal.  Vertebral bodies: Normal height and marrow signal.  T12-L1: No significant spinal canal stenosis or neuroforaminal narrowing.  L1-L2: No significant spinal canal stenosis or neuroforaminal narrowing.  L2-L3: No significant spinal canal stenosis or neuroforaminal narrowing.  L3-L4: No significant spinal canal stenosis or neuroforaminal narrowing.  L4-L5: No significant spinal canal stenosis or neuroforaminal narrowing.  L5-S1: No significant spinal canal stenosis or neuroforaminal narrowing.  Spinal cord: The cord extends down to the L1 level. The cord has a normal size, configuration and signal pattern.    - MRI Thoracic spine w/o 6/15/20:  FINDINGS:  Vertebral bodies: Normal vertebral body height, alignment and marrow signal.  C7-T1: The disc is normal. The central canal and neural foramen appear normal. No displacement or compression of the neural elements are seen.  T1-T2: The disc has a normal contour. The central canal and neural foramen appear normal. No displacement or compression of the neural elements are seen.  T2-T3: The disc has a normal contour. The central canal and neural foramen appear normal. No displacement or compression of the neural elements are seen.  T3-T4: The disc has a normal contour. The central canal and neural foramen appear normal. No displacement or compression of the neural elements are seen.  T4-T5: The disc has a normal contour. The central canal and neural foramen appear  normal. No displacement or compression of the neural elements are seen.  T5-T6: The disc has a normal contour. The central canal and neural foramen appear normal. No displacement or compression of the neural elements are seen.  T6-T7: The disc has a normal contour. The central canal and neural foramen appear normal. No displacement or compression of the neural elements are seen.  T7-T8: The disc has a normal contour. The central canal and neural foramen appear normal. No displacement or compression of the neural elements are seen.  T8-T9: The disc has a normal contour. The central canal and neural foramen appear normal. No displacement or compression of the neural elements are seen.  T9-T10: The disc has a normal contour. The central canal and neural foramen appear normal. No displacement or compression of the neural elements are seen.  T10-T11: The disc has a normal contour. The central canal and neural foramen appear normal. No displacement or compression of the neural elements are seen.  T11-T12: The disc has a normal contour. The central canal and neural foramen appear normal. No displacement or compression of the neural elements are seen.  T12-L1: The disc has a normal contour. The central canal and neural foramen appear normal. No displacement or compression of the neural elements are seen.  Spinal cord: The cord has a normal size, configuration and signal pattern.  Additional findings: None seen.      Labs:  BMP  Lab Results   Component Value Date     08/16/2023    K 5.4 (H) 08/16/2023     08/16/2023    CO2 29 08/16/2023    BUN 22.2 (H) 08/16/2023    CREATININE 0.74 08/16/2023    CALCIUM 10.0 08/16/2023    ANIONGAP 9.0 08/16/2023     Lab Results   Component Value Date    AST 16 08/16/2023     Lab Results   Component Value Date     08/16/2023       Assessment:  Margareth Echeverria is a 44 y.o. female with the following diagnoses based on history, exam, and imaging:    Problem List Items  Addressed This Visit    None  Visit Diagnoses       Nontraumatic coccydynia    -  Primary    Relevant Medications    pregabalin (LYRICA) 75 MG capsule    Other Relevant Orders    X-Ray Sacrum And Coccyx    Chronic pain syndrome        Relevant Medications    HYDROcodone-acetaminophen (NORCO) 5-325 mg per tablet    pregabalin (LYRICA) 75 MG capsule    Long term (current) use of opiate analgesic        Relevant Medications    HYDROcodone-acetaminophen (NORCO) 5-325 mg per tablet    pregabalin (LYRICA) 75 MG capsule    Cervical spondylosis        Relevant Medications    pregabalin (LYRICA) 75 MG capsule    Cervico-occipital neuralgia        Relevant Medications    pregabalin (LYRICA) 75 MG capsule    Other Relevant Orders    Nerve Block    Nerve Block    Chronic neck pain        Relevant Medications    pregabalin (LYRICA) 75 MG capsule    Sacroiliac joint dysfunction        Relevant Medications    pregabalin (LYRICA) 75 MG capsule                        This is a pleasant 44 y.o. lady presenting with:     - Chronic pelvic pain: Pain appears multifactorial and she has a complex history with prior hysterectomy for endometriosis, interstitial cystitis, b/l oophorectomy for ovarian cyst, prior bilateral inguinal hernia repair and hypophosphatasia.    - SIJ pain on the right currently and CSI has helped in the past   - Coccydynia currently primary painful area and is what is limiting her function the most.   - Neck pain and cervicogenic HA and left radicular arm pain. Appears to have occipital neuralgia on left which improved after occipital nerve blocks. She also has some myofascial pain on exam.    - Left radicular arm pain improved w/ OLEGARIO    - Patient had > 50% relief relief for > 3 months with noted improved function measured on PEG scale with prior ALEKSANDR.     - Patient with moderate to severe chronic neck pain that is predominantly axial with radicular pain that resolved with ALEKSANDR, but axial pain persists.. The pain is  severe enough to greatly impact quality of life &/or function as evidenced on the patients PEG score and NRS.    - Pain persists for greater than 3 months despite conservative treatment, including: Activity modification (avoiding exacerbating factors), physical therapy, and oral medications, and HEP.    - Facet joints currently suspected as primary pain generator based on clinical assessment & radiology studies, as there is no fracture, tumor, infection, and significant deformity. There is NO surgical fusion (such as Anterior Lumbar Interbody Fusion) at the spinal segment to be targeted..   - This facet level has not undergone ablation in the previous 2 years.    - Patient reports greater than or equal to 80% relief from previous MBB at this segment with preprocedure pain score noted as 10/10 and post-procedure pain score noted as 2/10 with consistent relief for duration of local anesthetic (3-4 hrs).   - Chronic bilateral low back pain: Pain appears primarily due to SIJ dysfunction on exam, but may have facet and myofascial contributions as well.    - Back pain improved after bilateral SIJ CSI   - She does note some radiation into the legs in L4/5 distribution and MRI with mild canal stenosis at L4-5 with facet arthropathy  - Nasal pain and deformity after fracture.   - History of fibromyalgia.   - History of familial hypophosphatasia and h/o polyarthralgia   - Comorbidities: Depression. ADHD. Hypophosphatasia. H/o Trigeminal neuralgia. IBS w/ constipation. H/o AV block. Former smoker. Allergy to opioids.     Treatment Plan:   - PT/OT/HEP: Cont HEP for neck pain.    - Discussed benefits of exercise for pain.   - Procedures: Performed left DANY and SHANELLE blocks today with 100% improvement noted  - Schedule ganglion impar block & sacrococcygeal joint corticosteroid injection under fluoroscopic guidance with local/MAC sedation. (CPT Code 510507 + 07882). The patient is not allergic to iodinated contrast. Patient is not  currently taking blood thinners for cardiovascular prophylaxis  - If neck pain worsens, can schedule diagnostic medial branch block (#2/2) of the bilateral C3-4 & C4-5 facet joints under fluoroscopic guidance with local sedation. (CPT Codes 22128, 65208, KX modifier & 50 modifer). If MBB successful x 2, plan for RFA. The patient is not allergic to iodinated contrast. Patient is not currently taking blood thinners for CV prophylaxis.  - Can repeat C7-T1 IL ALEKSANDR PRN  - Consider left T6 & T7 intercostal nerve block  - Consider repeat b/l SIJ CSI if back pain returns/worsens  - Consider L4-5 IL ALEKSANDR for radicular leg pains   - Consider b/l superior hypogastric plexus block for chronic pelvic pain  - Medications:   - DC gabapentin 300 mg to TID   - Trial pregabalin 75 mg BID.  reviewed.    - Cont Norco 5/325 mg q 6 hrs PRN pain (#120 per month). This is 20 MME.    - Goal to be down to 3.5/d at next refill. Going slow.   - Cont baclofen 5-10 mg daily PRN. Try to switch one hydrocodone pill for a baclofen tablet   - Cont tizanidine 4-8 mg qHS.    - Cont cymbalta 30 mg daily (didn't tolerate 60 mg)   - Pain contract signed 08/16/2023    -  reviewed  - Imaging: Reviewed.   - Labs: Reviewed.        Follow Up: RTC 2 weeks after procedrue or sooner PRN.    I spent a total of 42 minutes on the day of the visit.This includes face to face time and non-face to face time preparing to see the patient (eg, review of tests), obtaining and/or reviewing separately obtained history, documenting clinical information in the electronic or other health record, independently interpreting results and communicating results to the patient/family/caregiver, or care coordinator.        Katharine Steve MD  Interventional Pain Medicine

## 2024-11-12 NOTE — PROCEDURES
"Nerve Block    Date/Time: 11/12/2024 1:40 PM    Performed by: Katharine Steve MD  Authorized by: Katharine Steve MD  Consent Done: Yes  Time out: Immediately prior to procedure a "time out" was called to verify the correct patient, procedure, equipment, support staff and site/side marked as required.  Indications: pain relief  Body area: head  Nerve: greater occipital  Laterality: left    Patient sedated: no  Preparation: Patient was prepped and draped in the usual sterile fashion.  Patient position: sitting  Needle size: 25 G  Location technique: anatomical landmarks  Local Anesthetic: lidocaine 1% without epinephrine and bupivacaine 0.25% without epinephrine  Anesthetic total: 1 mL  Outcome: pain improved  Patient tolerance: Patient tolerated the procedure well with no immediate complications        "

## 2024-11-12 NOTE — PROCEDURES
"Nerve Block    Date/Time: 11/12/2024 1:40 PM    Performed by: Katharine Steve MD  Authorized by: Katharine Steve MD  Consent Done: Yes  Time out: Immediately prior to procedure a "time out" was called to verify the correct patient, procedure, equipment, support staff and site/side marked as required.  Indications: pain relief  Body area: head  Nerve: lesser occipital  Laterality: left    Patient sedated: no  Preparation: Patient was prepped and draped in the usual sterile fashion.  Patient position: sitting  Needle size: 25 G  Location technique: anatomical landmarks  Local Anesthetic: lidocaine 1% without epinephrine and bupivacaine 0.25% without epinephrine  Anesthetic total: 1 mL  Outcome: pain improved  Patient tolerance: Patient tolerated the procedure well with no immediate complications        "

## 2024-11-18 ENCOUNTER — OUTSIDE PLACE OF SERVICE (OUTPATIENT)
Dept: PAIN MEDICINE | Facility: CLINIC | Age: 44
End: 2024-11-18

## 2024-11-18 PROCEDURE — 20550 NJX 1 TENDON SHEATH/LIGAMENT: CPT | Mod: 59,,, | Performed by: PHYSICAL MEDICINE & REHABILITATION

## 2024-11-18 PROCEDURE — 64999 UNLISTED PX NERVOUS SYSTEM: CPT | Mod: ,,, | Performed by: PHYSICAL MEDICINE & REHABILITATION

## 2024-12-06 DIAGNOSIS — Z79.891 LONG TERM (CURRENT) USE OF OPIATE ANALGESIC: ICD-10-CM

## 2024-12-06 DIAGNOSIS — G89.4 CHRONIC PAIN SYNDROME: ICD-10-CM

## 2024-12-06 RX ORDER — HYDROCODONE BITARTRATE AND ACETAMINOPHEN 5; 325 MG/1; MG/1
1 TABLET ORAL EVERY 6 HOURS PRN
Qty: 120 TABLET | Refills: 0 | Status: SHIPPED | OUTPATIENT
Start: 2024-12-12 | End: 2025-01-11

## 2024-12-09 ENCOUNTER — PATIENT MESSAGE (OUTPATIENT)
Dept: PAIN MEDICINE | Facility: CLINIC | Age: 44
End: 2024-12-09
Payer: COMMERCIAL

## 2024-12-10 ENCOUNTER — PATIENT MESSAGE (OUTPATIENT)
Dept: PAIN MEDICINE | Facility: CLINIC | Age: 44
End: 2024-12-10
Payer: COMMERCIAL

## 2024-12-10 DIAGNOSIS — G89.4 CHRONIC PAIN SYNDROME: ICD-10-CM

## 2024-12-10 DIAGNOSIS — Z79.891 LONG TERM (CURRENT) USE OF OPIATE ANALGESIC: ICD-10-CM

## 2024-12-10 RX ORDER — HYDROCODONE BITARTRATE AND ACETAMINOPHEN 5; 325 MG/1; MG/1
1 TABLET ORAL EVERY 6 HOURS PRN
Qty: 120 TABLET | Refills: 0 | Status: SHIPPED | OUTPATIENT
Start: 2024-12-12 | End: 2025-01-11

## 2024-12-11 DIAGNOSIS — N83.209 OVARIAN CYST: Primary | ICD-10-CM

## 2024-12-12 ENCOUNTER — OFFICE VISIT (OUTPATIENT)
Dept: OBSTETRICS AND GYNECOLOGY | Facility: CLINIC | Age: 44
End: 2024-12-12
Payer: COMMERCIAL

## 2024-12-12 VITALS
DIASTOLIC BLOOD PRESSURE: 69 MMHG | WEIGHT: 117 LBS | SYSTOLIC BLOOD PRESSURE: 123 MMHG | BODY MASS INDEX: 20.08 KG/M2 | HEART RATE: 94 BPM

## 2024-12-12 DIAGNOSIS — R10.30 LOWER ABDOMINAL PAIN: Primary | ICD-10-CM

## 2024-12-12 RX ORDER — NITROFURANTOIN (MACROCRYSTALS) 100 MG/1
CAPSULE ORAL
COMMUNITY
Start: 2024-12-09

## 2024-12-12 RX ORDER — OXYCODONE AND ACETAMINOPHEN 10; 325 MG/1; MG/1
1 TABLET ORAL EVERY 8 HOURS PRN
COMMUNITY
Start: 2024-12-08

## 2024-12-12 NOTE — PROGRESS NOTES
Subjective     Patient ID: Margareth Echeverria is a 44 y.o. female.    Chief Complaint:  Abdominal Pain      History of Present Illness  Pelvic Pain  The patient's primary symptoms include pelvic pain. This is a new problem. The current episode started 1 to 4 weeks ago. The problem occurs constantly. The problem has been gradually worsening. The pain is moderate. The problem affects both sides. She is not pregnant. Associated symptoms include abdominal pain, back pain, a fever, flank pain, frequency and painful intercourse. Pertinent negatives include no anorexia, chills, constipation, diarrhea, discolored urine, dysuria, headaches, hematuria, nausea, rash, urgency or vomiting. The symptoms are aggravated by activity, bowel movements, intercourse and tactile pressure. She has tried acetaminophen, antibiotics, heating pad, NSAIDs, oral narcotics and warm bath for the symptoms. The treatment provided moderate relief. She is sexually active. No, her partner does not have an STD. She uses nothing and hysterectomy for contraception. She is postmenopausal. Her past medical history is significant for an abdominal surgery, endometriosis, a gynecological surgery, ovarian cysts and PID. There is no history of a  section, an ectopic pregnancy, herpes simplex, menorrhagia, metrorrhagia, miscarriage, perineal abscess, an STD, a terminated pregnancy or vaginosis.         GYN & OB History  No LMP recorded. Patient has had a hysterectomy.   Date of Last Pap: 2022    OB History    Para Term  AB Living   1 1       1   SAB IAB Ectopic Multiple Live Births           1      # Outcome Date GA Lbr Jose Armando/2nd Weight Sex Type Anes PTL Lv   1 Para                Review of Systems  Review of Systems   Constitutional:  Positive for fever. Negative for chills.   Gastrointestinal:  Positive for abdominal pain. Negative for anorexia, constipation, diarrhea, nausea and vomiting.   Genitourinary:  Positive for flank pain,  frequency and pelvic pain. Negative for dysuria, hematuria, menorrhagia and urgency.   Musculoskeletal:  Positive for back pain.   Integumentary:  Negative for rash.   Neurological:  Negative for headaches.          Objective   Physical Exam:   Constitutional: She appears well-developed and well-nourished. No distress.    HENT:   Head: Normocephalic and atraumatic.    Eyes: Conjunctivae and EOM are normal.    Neck: No tracheal deviation present. No thyromegaly present.    Cardiovascular:       Exam reveals no clubbing, no cyanosis and no edema.        Pulmonary/Chest: Effort normal. No respiratory distress.        Abdominal: Soft. She exhibits no distension and no mass. There is no abdominal tenderness. There is no rebound and no guarding. No hernia.     Genitourinary:    Vagina and rectum normal.      Pelvic exam was performed with patient supine.   There is no rash, tenderness, lesion or injury on the right labia. There is no rash, tenderness, lesion or injury on the left labia. Right adnexum displays no mass, no tenderness and no fullness. Left adnexum displays no mass, no tenderness and no fullness. No vaginal discharge in the vagina. Cervix is absent.Uterus is absent.                Skin: Skin is warm and dry. She is not diaphoretic. No cyanosis. Nails show no clubbing.    Psychiatric: She has a normal mood and affect. Her behavior is normal. Judgment and thought content normal.            Assessment and Plan     1. Lower abdominal pain            Plan:    No gyn prb at this time

## 2025-01-09 DIAGNOSIS — M25.50 POLYARTHRALGIA: ICD-10-CM

## 2025-01-09 DIAGNOSIS — M54.81 CERVICO-OCCIPITAL NEURALGIA: ICD-10-CM

## 2025-01-09 DIAGNOSIS — M54.12 CERVICAL RADICULOPATHY: ICD-10-CM

## 2025-01-09 DIAGNOSIS — G89.4 CHRONIC PAIN SYNDROME: ICD-10-CM

## 2025-01-09 DIAGNOSIS — M54.2 CHRONIC NECK PAIN: ICD-10-CM

## 2025-01-09 DIAGNOSIS — Z79.891 LONG TERM (CURRENT) USE OF OPIATE ANALGESIC: ICD-10-CM

## 2025-01-09 DIAGNOSIS — G89.29 CHRONIC NECK PAIN: ICD-10-CM

## 2025-01-09 RX ORDER — BACLOFEN 10 MG/1
5-10 TABLET ORAL DAILY PRN
Qty: 30 TABLET | Refills: 2 | Status: SHIPPED | OUTPATIENT
Start: 2025-01-09 | End: 2025-04-09

## 2025-01-09 RX ORDER — HYDROCODONE BITARTRATE AND ACETAMINOPHEN 5; 325 MG/1; MG/1
1 TABLET ORAL EVERY 6 HOURS PRN
Qty: 120 TABLET | Refills: 0 | Status: SHIPPED | OUTPATIENT
Start: 2025-01-11 | End: 2025-02-10

## 2025-01-09 RX ORDER — TIZANIDINE 4 MG/1
4-8 TABLET ORAL NIGHTLY PRN
Qty: 60 TABLET | Refills: 5 | Status: SHIPPED | OUTPATIENT
Start: 2025-01-09

## 2025-02-07 DIAGNOSIS — G89.4 CHRONIC PAIN SYNDROME: ICD-10-CM

## 2025-02-07 DIAGNOSIS — Z79.891 LONG TERM (CURRENT) USE OF OPIATE ANALGESIC: ICD-10-CM

## 2025-02-07 RX ORDER — HYDROCODONE BITARTRATE AND ACETAMINOPHEN 5; 325 MG/1; MG/1
1 TABLET ORAL EVERY 6 HOURS PRN
Qty: 120 TABLET | Refills: 0 | Status: SHIPPED | OUTPATIENT
Start: 2025-02-10 | End: 2025-03-12

## 2025-03-06 DIAGNOSIS — M54.81 CERVICO-OCCIPITAL NEURALGIA: ICD-10-CM

## 2025-03-06 DIAGNOSIS — G89.4 CHRONIC PAIN SYNDROME: ICD-10-CM

## 2025-03-06 DIAGNOSIS — M25.50 POLYARTHRALGIA: ICD-10-CM

## 2025-03-06 DIAGNOSIS — Z79.891 LONG TERM (CURRENT) USE OF OPIATE ANALGESIC: ICD-10-CM

## 2025-03-06 DIAGNOSIS — M54.12 CERVICAL RADICULOPATHY: ICD-10-CM

## 2025-03-07 RX ORDER — BACLOFEN 10 MG/1
5-10 TABLET ORAL DAILY PRN
Qty: 30 TABLET | Refills: 2 | Status: SHIPPED | OUTPATIENT
Start: 2025-03-07 | End: 2025-06-05

## 2025-03-07 RX ORDER — HYDROCODONE BITARTRATE AND ACETAMINOPHEN 5; 325 MG/1; MG/1
1 TABLET ORAL EVERY 6 HOURS PRN
Qty: 120 TABLET | Refills: 0 | Status: SHIPPED | OUTPATIENT
Start: 2025-03-12 | End: 2025-04-11

## 2025-04-03 ENCOUNTER — OFFICE VISIT (OUTPATIENT)
Dept: PAIN MEDICINE | Facility: CLINIC | Age: 45
End: 2025-04-03
Payer: COMMERCIAL

## 2025-04-03 VITALS
DIASTOLIC BLOOD PRESSURE: 65 MMHG | HEIGHT: 64 IN | WEIGHT: 117 LBS | HEART RATE: 78 BPM | BODY MASS INDEX: 19.97 KG/M2 | SYSTOLIC BLOOD PRESSURE: 112 MMHG | OXYGEN SATURATION: 98 %

## 2025-04-03 DIAGNOSIS — M46.1 SACROILIITIS: Primary | ICD-10-CM

## 2025-04-03 DIAGNOSIS — M54.12 CERVICAL RADICULOPATHY: ICD-10-CM

## 2025-04-03 DIAGNOSIS — M47.812 CERVICAL SPONDYLOSIS: ICD-10-CM

## 2025-04-03 DIAGNOSIS — G89.4 CHRONIC PAIN SYNDROME: ICD-10-CM

## 2025-04-03 DIAGNOSIS — M53.3 SACROILIAC JOINT DYSFUNCTION: ICD-10-CM

## 2025-04-03 DIAGNOSIS — M95.5 PELVIC OBLIQUITY: Primary | ICD-10-CM

## 2025-04-03 DIAGNOSIS — M54.81 CERVICO-OCCIPITAL NEURALGIA: ICD-10-CM

## 2025-04-03 DIAGNOSIS — Z79.891 LONG TERM (CURRENT) USE OF OPIATE ANALGESIC: ICD-10-CM

## 2025-04-03 DIAGNOSIS — M53.3 NONTRAUMATIC COCCYDYNIA: ICD-10-CM

## 2025-04-03 DIAGNOSIS — G89.29 CHRONIC NECK PAIN: ICD-10-CM

## 2025-04-03 DIAGNOSIS — M54.2 CHRONIC NECK PAIN: ICD-10-CM

## 2025-04-03 RX ORDER — ONDANSETRON 4 MG/1
TABLET, ORALLY DISINTEGRATING ORAL
COMMUNITY
Start: 2025-01-13 | End: 2025-04-03 | Stop reason: SDUPTHER

## 2025-04-03 RX ORDER — TIZANIDINE 4 MG/1
4 TABLET ORAL EVERY 6 HOURS PRN
Qty: 120 TABLET | Refills: 5 | Status: SHIPPED | OUTPATIENT
Start: 2025-04-03

## 2025-04-03 RX ORDER — PREGABALIN 75 MG/1
75 CAPSULE ORAL 2 TIMES DAILY
Qty: 60 CAPSULE | Refills: 5 | Status: SHIPPED | OUTPATIENT
Start: 2025-04-03 | End: 2025-10-02

## 2025-04-03 RX ORDER — HYDROCODONE BITARTRATE AND ACETAMINOPHEN 5; 325 MG/1; MG/1
1 TABLET ORAL
Qty: 105 TABLET | Refills: 0 | Status: SHIPPED | OUTPATIENT
Start: 2025-04-11 | End: 2025-05-11

## 2025-04-03 NOTE — PROCEDURES
"Nerve Block    Date/Time: 4/3/2025 8:20 AM    Performed by: Katharine Steve MD  Authorized by: Katharine Steve MD  Consent Done: Yes  Time out: Immediately prior to procedure a "time out" was called to verify the correct patient, procedure, equipment, support staff and site/side marked as required.  Indications: pain relief  Body area: head  Nerve: greater occipital  Laterality: left    Patient sedated: no  Preparation: Patient was prepped and draped in the usual sterile fashion.  Patient position: sitting  Needle size: 25 G  Location technique: anatomical landmarks  Local Anesthetic: lidocaine 1% without epinephrine and bupivacaine 0.25% without epinephrine  Anesthetic total: 1 mL  Outcome: pain improved  Patient tolerance: Patient tolerated the procedure well with no immediate complications        "

## 2025-04-03 NOTE — PROCEDURES
"Nerve Block    Date/Time: 4/3/2025 8:20 AM    Performed by: Katharine Steve MD  Authorized by: Katharine Steve MD  Consent Done: Yes  Time out: Immediately prior to procedure a "time out" was called to verify the correct patient, procedure, equipment, support staff and site/side marked as required.  Indications: pain relief  Body area: head  Nerve: lesser occipital  Laterality: left    Patient sedated: no  Preparation: Patient was prepped and draped in the usual sterile fashion.  Patient position: sitting  Needle size: 25 G  Location technique: anatomical landmarks  Local Anesthetic: lidocaine 1% without epinephrine and bupivacaine 0.25% without epinephrine  Anesthetic total: 1 mL  Outcome: pain improved  Patient tolerance: Patient tolerated the procedure well with no immediate complications        "

## 2025-04-03 NOTE — PATIENT INSTRUCTIONS
Pre-Procedural Instructions  Interventional Pain  Sacroiliac Joint Injection    Purpose:  We are performing a sacroiliac joint injection in which imaging guidance is being utilized to guide a needle into the sacroiliac joint in order to allow injection of medication (steroid and local anesthetic) into the joint to relieve your pain.    Informed Consent:  These procedures are safe, but like any interventional procedure, it does carry rare risks which include:  Infection   Bleeding  Allergic Reactions  No relief of pain    Preparing for the procedure:    Tell your healthcare provider about all medicines you take. This includes over-the-counter medicines, herbs, vitamins, and other supplements.  Tell your healthcare provider about any other medical or dental procedures planned in proximity to your procedure.   Reduce Infection Risk:   Shower or bathe the night before and morning of your scheduled procedure.  Notify clinic if you are:  Having signs of illness, such as fevers, or signs of a urinary tract infection (UTI)  Prescribed antibiotics for an infection  Home Support:  You MUST have a responsible  to bring you home from the facility and assist you at home on the day of the procedure   Plan to not be at work on the day of the procedure.   Medications:  Blood thinners: Such as: Aspirin, Plavix, Warfarin (Coumadin), Eliquis, Pradaxa, Xarelto, & others  If you are taking blood thinning medications, the clinic will notify you if you need to hold and of the number days to hold the medication prior to the procedure and when to restart these after the procedure. This will be communicated with and approved by your prescribing physician.   Please notify the office if you are taking blood thinning medications and are unsure if or when you need to hold the medication.   GLP-1 agonists: Semaglutide (Ozempic, Wegovy, Rybelsus), Tirzepatide (Mounjaro, Zepbound), Dulaglutide (Trulicity), Liraglutide, Lixisenatide,  Exenatide  These medications can increase the risk of regurgitation and pulmonary aspiration of gastric contents during general anesthesia and deep sedation  If you take this weekly, please hold for 1 week prior to the procedure  If you take this daily, please hold on the day of procedure  If these are utilized for blood sugar control, you will need to discuss with your managing provider on what to utilize for bridging the antidiabetic therapy to maintain blood sugar control and avoiding hyperglycemia  Please take other regular medications as scheduled.  Diet:  If you will be receiving sedation for the procedure.  Do not eat anything for 8 hours prior to your procedure.   You may drink clear liquids up to 4 hours prior to your procedure.   Clear liquids include: water, black coffee, fruit juice without pulp, clear tea  You may drink water up to 2 hours prior to your procedure.  You may use a sip of water to take your regular medications  If you are NOT receiving sedation for the procedure:  Do not eat anything for 2 hours prior to your procedure. You may eat a light meal more than 2 hours prior to your procedure.   You may use a sip of water to take your regular medications  For Diabetic Patients:  Please discuss with your managing physician the best way to take your medications on the procedure day to minimize large increases or decreases in your blood sugar  Please notify clinic of your most recent A1c and when that was performed. Optimizing your blood sugar control prior to the procedure will help decrease your risk of complications, such as infection.   Notify clinic and we will attempt to schedule your procedure as early in the morning as possible to minimize hypoglycemia that may occur while fasting for the procedure.  Please inform clinic if: Dr. Katharine Steve's clinic phone number is [phone]  You are pregnant or attempting to become pregnant  You have an implanted electronic device (pacemaker,  defibrillator, medication pump, stimulator, etc.)  You are having signs of infection noted above or are started on antibiotics.  You are allergic to iodinated contrast agents, local anesthetics, or steroids.  You are having other medical procedures around the time of your procedure (within 2 weeks)    Instructions for procedure day:    We ask that you please leave your valuables at home  Avoid moisturizers or scented personal products  Wear loose fitting clothing that you can easily change in & out of  Plan to not be at work on the day of the procedure.   Please remove jewelry.  If you are having a procedure done on your head or neck, please remove any metallic items or hardware, such as dental hardware, jewelry that may obscure the images of the area during the procedure.     After the procedure: You will be sent to a recovery room after the procedure. You will go home the same day.     Home Care Instructions after the procedure:    Injection site(s)  The injection sites may be covered with a dressing.  You may remove bandage at discharge from the hospital.   Bruising may be present  Avoid soaking or bathing in hot tub, bath or pool on the day of your procedure.   Showering is okay the day of procedure.   Avoid heat to the area  Notify the clinic if you are experiencing increased bleeding or drainage from the injection site(s)  Pain  Mild-moderate pain/discomfort may occur at the injection sites  Pain may be relieved with:  Ice  Tylenol (Acetaminophen)  Injection site pain typically improves after a day  Your baseline pain may take up to two weeks to improve after the procedure.  Activity/Diet:  Day of the procedure:  Do NOT:  Drive or operate heavy machinery  Perform strenuous activity, heavy lifting, bending or twisting.   Perform activity that requires skill, dexterity or coordination  If you received sedation:  For 24 hrs following the procedure DO NOT:   Drive or operate heavy machinery  Drink alcohol  Make  any important decisions  Day after the procedure:   Resume daily life activities as tolerated: Let pain be your guide. Progress slowly and try not to over exert yourself if you are feeling good. If possible, avoid activities that exacerbate your pain  Bed rest is NOT recommended   Physical Therapy (PT): You may restart your home exercise program the day following your procedure.  Diet: You may resume your normal diet as tolerated after the procedure  If nauseated, hold solid foods until nausea has resolved  Medications:  If you held any blood thinner medications for the procedure, you should have been given a specific timeline on when to restart the medication that has been determined in collaboration with your prescriber and our clinic. If you do not know when to restart, please notify clinic.  You may continue all other regular medications as prescribed.     When to call your healthcare provider [phone]  Call your healthcare provider if you have any of these symptoms:  Fever of 100.4°F (38.0°C) or higher. If you feel hot, take your temperature before notifying clinic.  New pain, weakness, tingling, or numbness in your legs. This may be expected in the first several hours after the procedure due to the local anesthetic used during the procedure.   New or worsening back pain   Redness, drainage or bleeding from site  Changes in bowel (incontinence) function  Changes in bladder (incontinence or retention) function  New or unusual headache &/or neck stiffness  Persistent nausea or vomiting with inability to tolerate clear liquids for more than 12 hours       When to seek medical attention  Call 911 right away if you have any of the following:  Chest pain  Shortness of breath  Signs of a stroke: Sudden changes in vision, changes in speech, sudden weakness in your face/arms/legs  Any other medical emergency     Follow-up: Post-procedure clinic appointment: 2-4 weeks after procedure       Dr. Katharine Steve,  M.D. [facility]  Phone: [phone]

## 2025-04-03 NOTE — PROGRESS NOTES
Pain Medicine      Chief Complaint:   Chief Complaint   Patient presents with    Neck Pain    Knee Pain       History of Present Illness: Margareth Echeverria is a 45 y.o. female referred by Dr. Nelda Thornton for Pelvic pain.      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 and she has a history of chronic pelvic pain and LBP with prior hysterectomy and b/l oophorectomy and she was feeling pretty good in regards to her chronic pelvic pain until Mother's day where she developed rather sudden increase in her pelvic pain with no clear inciting event  Location: involves her entire pelvis, but seems to be primarily localized over her sacrum area  Radiation: She feels some radiation into her anterior thighs  Timing: constant  Quality: Aching, Throbbing, Pounding, Deep, Stabbing, and nagging and swelling  Exacerbating Factors: Bowel movements, valsalva, intercourse, sitting, standing prolonged  Alleviating Factors: nothing  Associated Symptoms: She has neuropathy in her legs/feet from prior to this incident. She feels weakness in her legs. She has chronic constipation, but has been having daily BMs. She does note recent weight loss (states about 30 lbs). She does note night sweats. She denies fevers, urinary incontinence/change in function and bowel incontinence/change in function    Nothing seems to be helping, she has tried heating, cold. She saw gynecology and was sent here and to pelvic floor rehab.     She denies aggravation with urination.     Interval History (08/16/2023):  She is mostly having HA pain over the left side of her scalp. Pain radiates from the occiput to her parietal region and is constant and has been present for days. Stress seems to be aggravating it. She states she has occipital neuralgia and typically gets injections of her occipital nerves which helps her HA. Pain has been worsening because she has been under a lot of stress.  Her current physician managing her pain medicine, Dr. Anaya, has discharged her from clinic for no reason due to her having a procedure (SIJ CSI) with this clinic which helped her. She did nothing to breach her pain contract, but she has been having more difficulty with sleeping and worsening pain with all the stress he has been putting her through. She is prescribed hydrocodone/APAP 10/325 mg QID PRN and states she was not needing to take it as often until he discharged her out of the blue, just because she had her procedure done with this clinic instead of with the interventional pain physicians at the clinic that Dr. Anaya works.     Interval History (10/13/2023):  Neck pain: She is mostly having the pain in the left cervico-occipital region and this radiates into the left trapezius and down the left arm with numbness and tingling into the left hand. She denies any clear changes in strength recently. She denies changes in b/b function.     Opioid Risk Score         Value Time User    Opioid Risk Score  7 6/8/2023 10:19 AM Katharine Steve MD            Severity: Currently: 8/10   Typical Range: 5-10/10     Exacerbation: 10/10     P = 8  E = 10  G = 10  Baseline PEG Score = 9.33    Previous Therapies:  PT/OT: yes   Relevant Surgery: yes    - surgery for trigeminal neuralgia  Previous Medications:   - NSAIDS:   - Muscle Relaxants: Zanaflex 2mg   - TCAs:   - SNRIs: cymbalta  - Topicals: CBD  - Anticonvulsants:  gabapentin made her feel drunk  - Opioids: hydrocodone         Previous Interventions:  - 11/19/24: ganglion impar and sacrococcygeal joint injection w/ 100% relief ongoing  - 11/12/24: Left DANY and SHANELLE w/ excellent relief for 3-4 months  - 10/21/24: Bilateral C3-4 & C4-5 diagnostic MBB (#1/2) provided 80% relief (Pain scale went from 10/10 pre-procedure to post-procedure pain of 2/10 during anesthetic effect for 3-4 hrs following procedure).   - 8/26/24: C7-T1 IL ALEKSANDR w/ 75% relief of arm pain   - 1/8/24:  C7-T1 IL ALEKSANDR w/ 80% relief for > 6 months  - 8/16/23: left DANY and SHANELLE block in clinic with US guidance w/ 4 mg/mL dexamethasone and lido 1% which provided nearly complete relief.   - 6/23/23: B/L SIJ CSI w/ > 50% relief in pain and improved fxn.   - Procedures for neck and back pain, but not for pelvic pain      Interval History (04/03/2025):  Margareth Echeverria returns today for follow up.  At the last clinic visit, plan to schedule diagnostic medial branch block (#2/2) of the bilateral C3-4 & C4-5 facet joints if neck pain worsens.    Currently, the neck pain is worse.  Currently on the left side radiating into the back of her head.  This is similar to prior.  The prior greater and lesser occipital nerve blocks gave her several months of relief.    The ganglion impar block and sacrococcygeal joint injection provided 100% relief that is ongoing.    She is mostly having left sacroiliac joint pain currently and this is radiating into the left lateral hip.  She denies any new weakness or numbness in the legs.  This is similar to the prior left low back pain that responded well to sacroiliac joint injection.    Current Pain Scales:  Current: 8/10              Average: 7/10  Least-Worst: 4-8/10    Current PEG Score: 7.67     Current Pain Medications:  Cymbalta 30 mg  Norco 5/325mg   Tizanidine 2 mg   Gabapentin 300 mg   Baclofen 5-10    Blood Thinners: No    Full Medication List:    Current Outpatient Medications:     baclofen (LIORESAL) 10 MG tablet, Take 0.5-1 tablets (5-10 mg total) by mouth daily as needed (pain or spasm)., Disp: 30 tablet, Rfl: 2    COMPOUND HORMONE REPLACEMENT, Take by mouth once daily. Inject 1 pellet Q 3-4 months to hip, Disp: , Rfl:     lisdexamfetamine (VYVANSE) 20 MG capsule, Take 1 capsule (20 mg total) by mouth every morning., Disp: 30 capsule, Rfl: 0    nortriptyline (PAMELOR) 10 MG capsule, , Disp: , Rfl:     ondansetron (ZOFRAN) 4 MG tablet, Take 1 tablet (4 mg total) by mouth every 8  (eight) hours as needed for Nausea., Disp: 30 tablet, Rfl: 0    ondansetron (ZOFRAN-ODT) 4 MG TbDL, DISSOLVE 1 TABLET ON THE TONGUE THREE TIMES DAILY AS NEEDED FOR NAUSEA, Disp: , Rfl:     DULoxetine (CYMBALTA) 30 MG capsule, Take 1 capsule (30 mg total) by mouth once daily., Disp: 90 capsule, Rfl: 1    [START ON 4/11/2025] HYDROcodone-acetaminophen (NORCO) 5-325 mg per tablet, Take 1 tablet by mouth every 6 to 8 hours as needed for Pain., Disp: 105 tablet, Rfl: 0    nitrofurantoin (MACRODANTIN) 100 MG capsule, TAKE ONE TABLET BY MOUTH TWO TIMES DAILY FOR 5 DAYS (Patient not taking: Reported on 4/3/2025), Disp: , Rfl:     pregabalin (LYRICA) 75 MG capsule, Take 1 capsule (75 mg total) by mouth 2 (two) times daily., Disp: 60 capsule, Rfl: 5    tiZANidine (ZANAFLEX) 4 MG tablet, Take 1 tablet (4 mg total) by mouth every 6 (six) hours as needed (pain and spasm)., Disp: 120 tablet, Rfl: 5     Review of Systems:  ROS    Allergies:  Opioids - morphine analogues, Morphine, and Tramadol     Medical History:   has a past medical history of ADHD (attention deficit hyperactivity disorder), Adult hypophosphatasia, AV block, Fibromyalgia, IBS (irritable bowel syndrome), IC (interstitial cystitis), Occipital neuralgia, and Trigeminal neuralgia.    Surgical History:   has a past surgical history that includes Inguinal hernia repair (Bilateral); Laparoscopic cholecystectomy; Augmentation of breast; Fulguration of endometriosis (07/05/2016); Laparoscopy-assisted vaginal hysterectomy (11/11/2019); Laparoscopy (12/08/2019); Appendectomy; Cystoscopy with hydrodistension and biopsy of bladder (12/28/2020); Removal of breast implant (04/2021); Hysterectomy; and Breast surgery (Bilateral).    Family History:  family history includes Breast cancer in her maternal cousin; Lung cancer in her paternal grandmother; No Known Problems in her father and mother.    Social History:   reports that she quit smoking about 13 years ago. Her smoking use  "included cigarettes. She started smoking about 23 years ago. She has a 5 pack-year smoking history. She has never been exposed to tobacco smoke. She has never used smokeless tobacco. She reports current alcohol use. She reports current drug use. Drug: Marijuana.    Physical Exam:  /65   Pulse 78   Ht 5' 4" (1.626 m)   Wt 53.1 kg (117 lb)   SpO2 98%   BMI 20.08 kg/m²   GEN: No acute distress. Calm, comfortable  HENT: Normocephalic, atraumatic, moist mucous membranes  EYE: Anicteric sclera, non-injected.   CV: Non-diaphoretic. Regular Rate. Radial Pulses 2+.  RESP: Breathing comfortably. Chest expansion symmetric.  EXT: No clubbing, cyanosis.   SKIN: Warm, & dry to palpation. No visible rashes or lesions of exposed skin.   PSYCH: Pleasant mood and appropriate affect. Recent and remote memory intact.   GAIT: Independent, normal ambulation  Neck Exam:       Inspection: No erythema, bruising.       Palpation: (+) TTP of left cervical paraspinals and over SHANELLE and DANY      ROM: No significant Limitation in flexion, extension, lateral bending or rotation      Provocative Maneuvers:  (-) Spurling's bilaterally  Lumbar Spine Exam:       Inspection: No bruising, swelling. Slight erythema noted at bilateral injection sites.        Palpation: No calor to the area. (+) TTP of b/l SIJ > lumbar paraspinals b/l. (+) TTP of sacrum and coccyx         ROM:  Limited in flexion, extension, lateral bending.       (+) Facet loading bilaterally      (-) Straight Leg Raise bilaterally      (+) ISABELLA bilaterally      (+) SIJ Compression Test bilaterally      (+) Gaenslan's Test bilaterally      (+) Thigh thrust/Posterior Pelvic Pain Provocation Test bilaterally      (+) Sacral thrust  Hip/Pelvis Exam:      Inspection: No gross deformity or apparent leg length discrepancy      Palpation: (+) TTP along inguinal ligaments b/l, pubic symphysis. (+) TTP right ischial bursa and b/l piriformis and gluteal muscles, (-) TTP to bilateral " greater trochanteric bursas.       ROM:  No limitation or pain in internal rotation, external rotation b/l  Neurologic Exam:     Alert. Speech is fluent and appropriate.     Strength: 4/5 in left AbDM, wrist extension, 4/5 diffusely in the left leg, otherwise 5/5 throughout bilateral upper and lower extremities     Sensation:  Grossly intact to light touch in bilateral upper and lower extremities     Reflexes: 2+ in b/l BR, biceps, triceps, patella, achilles     Tone: No abnormality appreciated in bilateral lower extremities     No Clonus     Downgoing toes on plantar stimulation     (-) Franco bilaterally           Imaging:  - MRI C-spine 11/21/23:      - X-Ray Cervical Spine AP & Lateral 10/13/23  There is some reversal of the normal cervical lordosis.  Vertebral body heights are within normal limits.  No definite spondylolisthesis demonstrated.  Mild-to-moderate disc height loss noted at C5-6.  Mild disc height loss at C4-5. Posterior elements appear intact without acute fractures or subluxations demonstrated.  Odontoid process appears intact.  Atlantoaxial articulations appear normal.  Prevertebral soft tissues are within normal limits.  Postoperative changes are noted involving the skull on the left in the suboccipital region region.     Impression:     Degenerative findings as above    - MRI L-spine 6/13/23:  Vertebral body alignment is preserved. Vertebral body heights are maintained. Bone marrow signal is within normal limits. The conus medullaris terminates normally at the level of L2. Lumbar nerve roots have normal appearance. Preserved intervertebral disc space height and signal.    T12-L1: No significant canal or foraminal stenosis.    L1-L2: No significant canal or foraminal stenosis.    L2-L3: No significant canal or foraminal stenosis.    L3-L4: No significant canal or foraminal stenosis.    L4-L5: Broad-based disc bulge, ligamentum flavum thickening, facet hypertrophy with at most mild canal  stenosis. Mild effacement of the lateral recesses. No significant foraminal stenosis.    L5-S1: No significant canal or foraminal stenosis.        - MRI Pelvis w.o 6/13/23:  The femoral heads are well formed and seated within well formed acetabula. Bone marrow signal is normal. The sacroiliac joints are intact. There is no muscle atrophy or edema. There is no intrapelvic visceral abnormalities. Prior hysterectomy. There is no evidence of fluid collection. There is mild right hamstring origin tendinosis. The gluteus medius and minimus tendon insertions are intact. The hip adductors are intact. There is no evidence of iliopsoas or trochanteric bursitis.    Impression:    Mild right hamstring origin tendinosis. Otherwise, negative pelvic MRI.      - X-ray lumbar spine 6/8/23:  Transitional anatomy with sacralization of L5.  Vertebral body heights maintained.  No spondylolisthesis.  Disc spaces maintained.  Mild facet arthropathy.  No acute osseous abnormality.  Bilateral SI joint degenerative changes.  Constipation suspected.    - X-ray pelvis 6/8/23:  Hip joint spaces maintained.  Likely transitional anatomy with L5 sacralization.  Symmetric degenerative findings of the bilateral SI joints.  Constipation findings noted.    - Pelvis CT w/o contrast 1/6/23:  The visualized gastrointestinal tract appears normal.  Urinary bladder is normal.  Inguinal regions are normal.  No masses, fluid collections or adenopathy seen.  1. There is a cystic area on the left side of the pelvis measuring about 5.8 cm. This has a simple cystic appearance to it. Statistically this most likely represents a ovarian cyst. The patient's uterus has apparently beenremoved.  The bony elements appear intact.  Impression:  1. Cyst on the left side of the pelvis. Statistically most likely represents an ovarian cyst. Evaluation with pelvic ultrasound is recommended.    - Pelvic US 12/21/22:  Transabdominal and transvaginal pelvic ultrasound was  performed by the sonographer. Static images are submitted. Uterus is not visualized consistent with patient's history of previous partial hysterectomy. Right  ovary is not visualized. Patient provides a history of previous right oophorectomy. Left ovary measures 3.4 x 3.9 x 2.9 cm in size. Multiple left ovarian follicles are visualized. Small left ovarian cyst is visualized measuring 1.8 cm in diameter. Left ovarian blood flow is present. Trace amount of pelvic free fluid is present adjacent to the vaginal cuff.  IMPRESSION:  1. Nonvisualization of the uterus and right ovary consistent with patient's surgical history.  2. Small 1.8 cm left ovarian cyst.  3. Trace amount of pelvic free fluid.    - MRI C-spine 9/26/22:  Reversal of the normal cervical lordotic curvature. The vertebral body heights and alignment are maintained throughout the cervical spine. No abnormal vertebral body marrow signal. Multilevel intervertebral disc desiccation.  Spinal cord is normal in caliber and demonstrates normal signal. The visualized portions of the cervicomedullary junction are unremarkable.  C2-C3: Normal intervertebral disc contour. No central canal or neural foraminal narrowing.  C3-C4: Intervertebral disc bulge narrows the ventral subarachnoid space. No central canal or neural foraminal narrowing.  C4-C5: Intervertebral disc bulge eccentric to the right side. There is narrowing of the ventral subarachnoid space. The central canal is borderline measuring 10 mm. Moderate right and mild left neural foraminal narrowing.   C5-C6: Intervertebral disc bulge with superimposed left paracentral disc protrusion. There is narrowing of the ventral subarachnoid space and left subarticular recess. Bilateral uncovertebral and facet hypertrophy. Moderate bilateral neural foraminal narrowing. The central canal is narrowed to 8 mm.  C6-C7: Intervertebral disc bulge narrows the ventral subarachnoid space. The central canal is narrowed to 9 mm. No  neural foraminal narrowing.  C7-T1: Normal intervertebral disc contour. No central canal or neural foraminal narrowing.  The paravertebral soft tissues are unremarkable.  IMPRESSION:  Multilevel cervical spondylosis most prominent at C4-7. The findings are not significantly changed when compared to the prior exam.    - MRI Lumbar spine 8/17/21:  Alignment: Normal.  Vertebral bodies: Normal height and marrow signal.  T12-L1: No significant spinal canal stenosis or neuroforaminal narrowing.  L1-L2: No significant spinal canal stenosis or neuroforaminal narrowing.  L2-L3: No significant spinal canal stenosis or neuroforaminal narrowing.  L3-L4: No significant spinal canal stenosis or neuroforaminal narrowing.  L4-L5: No significant spinal canal stenosis or neuroforaminal narrowing.  L5-S1: No significant spinal canal stenosis or neuroforaminal narrowing.  Spinal cord: The cord extends down to the L1 level. The cord has a normal size, configuration and signal pattern.    - MRI Thoracic spine w/o 6/15/20:  FINDINGS:  Vertebral bodies: Normal vertebral body height, alignment and marrow signal.  C7-T1: The disc is normal. The central canal and neural foramen appear normal. No displacement or compression of the neural elements are seen.  T1-T2: The disc has a normal contour. The central canal and neural foramen appear normal. No displacement or compression of the neural elements are seen.  T2-T3: The disc has a normal contour. The central canal and neural foramen appear normal. No displacement or compression of the neural elements are seen.  T3-T4: The disc has a normal contour. The central canal and neural foramen appear normal. No displacement or compression of the neural elements are seen.  T4-T5: The disc has a normal contour. The central canal and neural foramen appear normal. No displacement or compression of the neural elements are seen.  T5-T6: The disc has a normal contour. The central canal and neural foramen  appear normal. No displacement or compression of the neural elements are seen.  T6-T7: The disc has a normal contour. The central canal and neural foramen appear normal. No displacement or compression of the neural elements are seen.  T7-T8: The disc has a normal contour. The central canal and neural foramen appear normal. No displacement or compression of the neural elements are seen.  T8-T9: The disc has a normal contour. The central canal and neural foramen appear normal. No displacement or compression of the neural elements are seen.  T9-T10: The disc has a normal contour. The central canal and neural foramen appear normal. No displacement or compression of the neural elements are seen.  T10-T11: The disc has a normal contour. The central canal and neural foramen appear normal. No displacement or compression of the neural elements are seen.  T11-T12: The disc has a normal contour. The central canal and neural foramen appear normal. No displacement or compression of the neural elements are seen.  T12-L1: The disc has a normal contour. The central canal and neural foramen appear normal. No displacement or compression of the neural elements are seen.  Spinal cord: The cord has a normal size, configuration and signal pattern.  Additional findings: None seen.      Labs:  BMP  Lab Results   Component Value Date     2023    K 5.4 (H) 2023     2023    CO2 29 2023    BUN 22.2 (H) 2023    CREATININE 0.74 2023    CALCIUM 10.0 2023    ANIONGAP 9.0 2023     Lab Results   Component Value Date    AST 16 2023     Lab Results   Component Value Date     2023       Assessment:  Margareth Echeverria is a 45 y.o. female with the following diagnoses based on history, exam, and imagin. Pelvic obliquity  -     Ambulatory Referral/Consult to Physical Therapy; Future; Expected date: 04/10/2025    2. Chronic pain syndrome  -     HYDROcodone-acetaminophen  (NORCO) 5-325 mg per tablet; Take 1 tablet by mouth every 6 to 8 hours as needed for Pain.  Dispense: 105 tablet; Refill: 0  -     tiZANidine (ZANAFLEX) 4 MG tablet; Take 1 tablet (4 mg total) by mouth every 6 (six) hours as needed (pain and spasm).  Dispense: 120 tablet; Refill: 5  -     pregabalin (LYRICA) 75 MG capsule; Take 1 capsule (75 mg total) by mouth 2 (two) times daily.  Dispense: 60 capsule; Refill: 5    3. Long term (current) use of opiate analgesic  -     HYDROcodone-acetaminophen (NORCO) 5-325 mg per tablet; Take 1 tablet by mouth every 6 to 8 hours as needed for Pain.  Dispense: 105 tablet; Refill: 0  -     tiZANidine (ZANAFLEX) 4 MG tablet; Take 1 tablet (4 mg total) by mouth every 6 (six) hours as needed (pain and spasm).  Dispense: 120 tablet; Refill: 5  -     pregabalin (LYRICA) 75 MG capsule; Take 1 capsule (75 mg total) by mouth 2 (two) times daily.  Dispense: 60 capsule; Refill: 5    4. Cervico-occipital neuralgia  -     tiZANidine (ZANAFLEX) 4 MG tablet; Take 1 tablet (4 mg total) by mouth every 6 (six) hours as needed (pain and spasm).  Dispense: 120 tablet; Refill: 5  -     pregabalin (LYRICA) 75 MG capsule; Take 1 capsule (75 mg total) by mouth 2 (two) times daily.  Dispense: 60 capsule; Refill: 5  -     Nerve Block  -     Nerve Block    5. Chronic neck pain  -     tiZANidine (ZANAFLEX) 4 MG tablet; Take 1 tablet (4 mg total) by mouth every 6 (six) hours as needed (pain and spasm).  Dispense: 120 tablet; Refill: 5  -     pregabalin (LYRICA) 75 MG capsule; Take 1 capsule (75 mg total) by mouth 2 (two) times daily.  Dispense: 60 capsule; Refill: 5    6. Cervical spondylosis  -     pregabalin (LYRICA) 75 MG capsule; Take 1 capsule (75 mg total) by mouth 2 (two) times daily.  Dispense: 60 capsule; Refill: 5    7. Sacroiliac joint dysfunction  -     pregabalin (LYRICA) 75 MG capsule; Take 1 capsule (75 mg total) by mouth 2 (two) times daily.  Dispense: 60 capsule; Refill: 5    8. Cervical  radiculopathy  -     tiZANidine (ZANAFLEX) 4 MG tablet; Take 1 tablet (4 mg total) by mouth every 6 (six) hours as needed (pain and spasm).  Dispense: 120 tablet; Refill: 5    9. Nontraumatic coccydynia  -     pregabalin (LYRICA) 75 MG capsule; Take 1 capsule (75 mg total) by mouth 2 (two) times daily.  Dispense: 60 capsule; Refill: 5          This is a pleasant 45 y.o. lady presenting with:     - Chronic pelvic pain: Pain appears multifactorial and she has a complex history with prior hysterectomy for endometriosis, interstitial cystitis, b/l oophorectomy for ovarian cyst, prior bilateral inguinal hernia repair and hypophosphatasia.    - Coccydynia currently primary painful area and is what is limiting her function the most.    - Improved   - Neck pain and cervicogenic HA and left radicular arm pain. Appears to have occipital neuralgia on left which improved after occipital nerve blocks. She also has some myofascial pain on exam.    - Left radicular arm pain improved w/ OLEGARIO    - Patient had > 50% relief relief for > 3 months with noted improved function measured on PEG scale with prior ALEKSANDR.     - Patient with moderate to severe chronic neck pain that is predominantly axial with radicular pain that resolved with ALEKSANDR, but axial pain persists.. The pain is severe enough to greatly impact quality of life &/or function as evidenced on the patients PEG score and NRS.    - Pain persists for greater than 3 months despite conservative treatment, including: Activity modification (avoiding exacerbating factors), physical therapy, and oral medications, and HEP.    - Facet joints currently suspected as primary pain generator based on clinical assessment & radiology studies, as there is no fracture, tumor, infection, and significant deformity. There is NO surgical fusion (such as Anterior Lumbar Interbody Fusion) at the spinal segment to be targeted..   - This facet level has not undergone ablation in the previous 2 years.    -  Patient reports greater than or equal to 80% relief from previous MBB at this segment with preprocedure pain score noted as 10/10 and post-procedure pain score noted as 2/10 with consistent relief for duration of local anesthetic (3-4 hrs).   - Chronic bilateral low back pain: Pain appears primarily due to SIJ dysfunction on exam, but may have facet and myofascial contributions as well.    - Back pain improved after bilateral SIJ CSI    - Patient with greater than 3 months of moderate to severe low back pain below L5 without radiculopathy or radicular pain.  - Exam reveals greater than 3/5 SIJ provocative maneuvers.  - The pain is severe enough to greatly impact quality of life & function as evidenced on the patients disability/functional score and NRS.   - Patient has failed greater than 6 weeks of conservative management including: Activity modification (avoiding exacerbating factors), physical therapy, physician-led home exercise program, and oral medications.  - History of fibromyalgia.   - History of familial hypophosphatasia and h/o polyarthralgia   - Comorbidities: Depression. ADHD. Hypophosphatasia. H/o Trigeminal neuralgia. IBS w/ constipation. H/o AV block. Former smoker. Allergy to opioids.     Treatment Plan:   - PT/OT/HEP: Refer to PT.   - Cont HEP for neck and back pain.    - Discussed benefits of exercise for pain.   - Procedures: Performed left DANY and SHANELLE blocks today with 100% improvement noted  - Schedule left sacroiliac joint therapeutic corticosteroid injection under fluoroscopic guidance with local/MAC sedation.  sedation. (CPT Codes 08802). The patient is not allergic to iodinated contrast. Patient is not currently taking blood thinners for cardiovascular prophylaxis  - Can repeat ganglion impar block & sacrococcygeal joint corticosteroid injection PRN  - If neck pain worsens, can schedule diagnostic medial branch block (#2/2) of the bilateral C3-4 & C4-5 facet joints under fluoroscopic  guidance with local sedation. (CPT Codes 35227, 34466, KX modifier & 50 modifer). If MBB successful x 2, plan for RFA. The patient is not allergic to iodinated contrast. Patient is not currently taking blood thinners for CV prophylaxis.  - Can repeat C7-T1 IL ALEKSANDR PRN if arm pain worsens  - Consider b/l superior hypogastric plexus block for chronic pelvic pain  - Medications:   - Trial pregabalin 75 mg BID.  reviewed.    - Decrease Norco 5/325 mg q 6-8 hrs PRN pain (#105 per month).    - Goal to be down to 3/d at next refill. Going slow.    - Next refill plan for TID  - Cont baclofen 5-10 mg daily PRN. Try to switch one hydrocodone pill for a baclofen tablet   - Increase tizanidine to 4mg in AM, 4 mg mid-day and 8 mg qHS.    - Cont cymbalta 30 mg daily (didn't tolerate 60 mg)   - Pain contract signed 08/16/2023    -  reviewed  - Imaging: Reviewed.   - Labs: Reviewed.        Follow Up: RTC 2 weeks after procedrue or sooner PRN.    I spent a total of 47 minutes on the day of the visit.This includes face to face time and non-face to face time preparing to see the patient (eg, review of tests), obtaining and/or reviewing separately obtained history, documenting clinical information in the electronic or other health record, independently interpreting results and communicating results to the patient/family/caregiver, or care coordinator.        Katharine Steve MD  Interventional Pain Medicine

## 2025-04-04 ENCOUNTER — TELEPHONE (OUTPATIENT)
Dept: PAIN MEDICINE | Facility: CLINIC | Age: 45
End: 2025-04-04
Payer: COMMERCIAL

## 2025-04-04 NOTE — TELEPHONE ENCOUNTER
----- Message from Teresa sent at 4/4/2025  9:21 AM CDT -----  Regarding: PA Approval  DAVID Approved Auth# [id number] for dates 04/07 to 07/06/25

## 2025-04-21 ENCOUNTER — OUTSIDE PLACE OF SERVICE (OUTPATIENT)
Dept: PAIN MEDICINE | Facility: CLINIC | Age: 45
End: 2025-04-21

## 2025-05-06 ENCOUNTER — CLINICAL SUPPORT (OUTPATIENT)
Dept: OBSTETRICS AND GYNECOLOGY | Facility: CLINIC | Age: 45
End: 2025-05-06
Payer: COMMERCIAL

## 2025-05-06 ENCOUNTER — OFFICE VISIT (OUTPATIENT)
Dept: PAIN MEDICINE | Facility: CLINIC | Age: 45
End: 2025-05-06
Payer: COMMERCIAL

## 2025-05-06 ENCOUNTER — PATIENT MESSAGE (OUTPATIENT)
Dept: PAIN MEDICINE | Facility: CLINIC | Age: 45
End: 2025-05-06

## 2025-05-06 VITALS
WEIGHT: 117 LBS | OXYGEN SATURATION: 99 % | DIASTOLIC BLOOD PRESSURE: 73 MMHG | HEIGHT: 64 IN | SYSTOLIC BLOOD PRESSURE: 117 MMHG | BODY MASS INDEX: 19.97 KG/M2 | HEART RATE: 89 BPM

## 2025-05-06 DIAGNOSIS — G89.29 CHRONIC NECK PAIN: ICD-10-CM

## 2025-05-06 DIAGNOSIS — Z79.891 LONG TERM (CURRENT) USE OF OPIATE ANALGESIC: ICD-10-CM

## 2025-05-06 DIAGNOSIS — M47.812 CERVICAL SPONDYLOSIS: ICD-10-CM

## 2025-05-06 DIAGNOSIS — Z01.89 ROUTINE LAB DRAW: Primary | ICD-10-CM

## 2025-05-06 DIAGNOSIS — M47.816 LUMBAR SPONDYLOSIS: Primary | ICD-10-CM

## 2025-05-06 DIAGNOSIS — G89.4 CHRONIC PAIN SYNDROME: ICD-10-CM

## 2025-05-06 DIAGNOSIS — G89.29 CHRONIC BILATERAL LOW BACK PAIN WITHOUT SCIATICA: ICD-10-CM

## 2025-05-06 DIAGNOSIS — M53.3 NONTRAUMATIC COCCYDYNIA: ICD-10-CM

## 2025-05-06 DIAGNOSIS — M53.3 SACROILIAC JOINT DYSFUNCTION: ICD-10-CM

## 2025-05-06 DIAGNOSIS — M54.2 CHRONIC NECK PAIN: ICD-10-CM

## 2025-05-06 DIAGNOSIS — M54.81 CERVICO-OCCIPITAL NEURALGIA: ICD-10-CM

## 2025-05-06 DIAGNOSIS — M54.50 CHRONIC BILATERAL LOW BACK PAIN WITHOUT SCIATICA: ICD-10-CM

## 2025-05-06 LAB
ALBUMIN SERPL-MCNC: 4.6 G/DL (ref 3.5–5.2)
ALBUMIN/GLOB SERPL ELPH: 2 {RATIO} (ref 1–2.7)
ALP ISOS SERPL LEV INH-CCNC: 28 U/L (ref 35–105)
ALT (SGPT): 16 U/L (ref 0–33)
ANION GAP SERPL CALC-SCNC: 9 MMOL/L (ref 8–17)
AST SERPL-CCNC: 19 U/L (ref 0–32)
BILIRUBIN, TOTAL: 0.73 MG/DL (ref 0–1.2)
BUN/CREAT SERPL: 21.3 (ref 6–20)
CALCIUM SERPL-MCNC: 9.4 MG/DL (ref 8.6–10.2)
CARBON DIOXIDE, CO2: 29 MMOL/L (ref 22–29)
CHLORIDE: 102 MMOL/L (ref 98–107)
CREAT SERPL-MCNC: 0.69 MG/DL (ref 0.5–0.9)
GFR ESTIMATION: 109 ML/MIN/1.73M2
GLOBULIN: 2.3 G/DL (ref 1.5–4.5)
GLUCOSE: 83 MG/DL (ref 74–106)
POTASSIUM: 3.7 MMOL/L (ref 3.5–5.1)
PROT SNV-MCNC: 6.9 G/DL (ref 6.4–8.3)
SODIUM: 140 MMOL/L (ref 136–145)
UREA NITROGEN (BUN): 14.7 MG/DL (ref 6–20)

## 2025-05-06 PROCEDURE — 3074F SYST BP LT 130 MM HG: CPT | Mod: CPTII,,, | Performed by: PHYSICIAN ASSISTANT

## 2025-05-06 PROCEDURE — 99215 OFFICE O/P EST HI 40 MIN: CPT | Mod: S$PBB,,, | Performed by: PHYSICIAN ASSISTANT

## 2025-05-06 PROCEDURE — 3078F DIAST BP <80 MM HG: CPT | Mod: CPTII,,, | Performed by: PHYSICIAN ASSISTANT

## 2025-05-06 PROCEDURE — 3008F BODY MASS INDEX DOCD: CPT | Mod: CPTII,,, | Performed by: PHYSICIAN ASSISTANT

## 2025-05-06 PROCEDURE — 1159F MED LIST DOCD IN RCRD: CPT | Mod: CPTII,,, | Performed by: PHYSICIAN ASSISTANT

## 2025-05-06 RX ORDER — HYDROCODONE BITARTRATE AND ACETAMINOPHEN 5; 325 MG/1; MG/1
1 TABLET ORAL
Qty: 105 TABLET | Refills: 0 | Status: SHIPPED | OUTPATIENT
Start: 2025-05-11 | End: 2025-06-10

## 2025-05-06 RX ORDER — PREGABALIN 50 MG/1
50 CAPSULE ORAL 3 TIMES DAILY
Qty: 90 CAPSULE | Refills: 5 | Status: SHIPPED | OUTPATIENT
Start: 2025-05-06 | End: 2025-11-02

## 2025-05-06 RX ORDER — VIT C/E/ZN/COPPR/LUTEIN/ZEAXAN 250MG-90MG
5000 CAPSULE ORAL
COMMUNITY

## 2025-05-06 RX ORDER — ONDANSETRON 4 MG/1
TABLET, ORALLY DISINTEGRATING ORAL
COMMUNITY
Start: 2024-11-25

## 2025-05-06 NOTE — PROGRESS NOTES
Pain Medicine      Chief Complaint:   Chief Complaint   Patient presents with    Low-back Pain       History of Present Illness: Margareth Echeverria is a 45 y.o. female referred by Dr. Nelda Thornton for Pelvic pain.      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 and she has a history of chronic pelvic pain and LBP with prior hysterectomy and b/l oophorectomy and she was feeling pretty good in regards to her chronic pelvic pain until Mother's day where she developed rather sudden increase in her pelvic pain with no clear inciting event  Location: involves her entire pelvis, but seems to be primarily localized over her sacrum area  Radiation: She feels some radiation into her anterior thighs  Timing: constant  Quality: Aching, Throbbing, Pounding, Deep, Stabbing, and nagging and swelling  Exacerbating Factors: Bowel movements, valsalva, intercourse, sitting, standing prolonged  Alleviating Factors: nothing  Associated Symptoms: She has neuropathy in her legs/feet from prior to this incident. She feels weakness in her legs. She has chronic constipation, but has been having daily BMs. She does note recent weight loss (states about 30 lbs). She does note night sweats. She denies fevers, urinary incontinence/change in function and bowel incontinence/change in function    Nothing seems to be helping, she has tried heating, cold. She saw gynecology and was sent here and to pelvic floor rehab.     She denies aggravation with urination.   Severity: Currently: 8/10   Typical Range: 5-10/10     Exacerbation: 10/10     P = 8  E = 10  G = 10  Baseline PEG Score = 9.33    Opioid Risk Score         Value Time User    Opioid Risk Score  7 6/8/2023 10:19 AM Katharine Steve MD          Previous Therapies:  PT/OT: yes   Relevant Surgery: yes    - surgery for trigeminal neuralgia  Previous Medications:   - NSAIDS:   - Muscle Relaxants: Zanaflex 2mg   - TCAs:   -  SNRIs: cymbalta  - Topicals: CBD  - Anticonvulsants:  gabapentin made her feel drunk  - Opioids: hydrocodone      Previous Interventions:  - 4/21/25: Bilateral SIJ CSI w/ limited relief.   - 11/19/24: ganglion impar and sacrococcygeal joint injection w/ 100% relief ongoing  - 11/12/24: Left DANY and SHANELLE w/ excellent relief for 3-4 months  - 10/21/24: Bilateral C3-4 & C4-5 diagnostic MBB (#1/2) provided 80% relief (Pain scale went from 10/10 pre-procedure to post-procedure pain of 2/10 during anesthetic effect for 3-4 hrs following procedure).   - 8/26/24: C7-T1 IL ALEKSANDR w/ 75% relief of arm pain   - 1/8/24: C7-T1 IL ALEKSANDR w/ 80% relief for > 6 months  - 8/16/23: left DANY and SHANELLE block in clinic with US guidance w/ 4 mg/mL dexamethasone and lido 1% which provided nearly complete relief.   - 6/23/23: B/L SIJ CSI w/ > 50% relief in pain and improved fxn.   - Procedures for neck and back pain, but not for pelvic pain    Interval History (08/16/2023):  She is mostly having HA pain over the left side of her scalp. Pain radiates from the occiput to her parietal region and is constant and has been present for days. Stress seems to be aggravating it. She states she has occipital neuralgia and typically gets injections of her occipital nerves which helps her HA. Pain has been worsening because she has been under a lot of stress. Her current physician managing her pain medicine, Dr. Anaya, has discharged her from clinic for no reason due to her having a procedure (SIJ CSI) with this clinic which helped her. She did nothing to breach her pain contract, but she has been having more difficulty with sleeping and worsening pain with all the stress he has been putting her through. She is prescribed hydrocodone/APAP 10/325 mg QID PRN and states she was not needing to take it as often until he discharged her out of the blue, just because she had her procedure done with this clinic instead of with the interventional pain physicians at the  clinic that Dr. Anaya works.     Interval History (10/13/2023):  Neck pain: She is mostly having the pain in the left cervico-occipital region and this radiates into the left trapezius and down the left arm with numbness and tingling into the left hand. She denies any clear changes in strength recently. She denies changes in b/b function.     Interval History (04/03/2025):  Margareth Echeverria returns today for follow up.  At the last clinic visit, plan to schedule diagnostic medial branch block (#2/2) of the bilateral C3-4 & C4-5 facet joints if neck pain worsens.    Currently, the neck pain is worse.  Currently on the left side radiating into the back of her head.  This is similar to prior.  The prior greater and lesser occipital nerve blocks gave her several months of relief.    The ganglion impar block and sacrococcygeal joint injection provided 100% relief that is ongoing.    She is mostly having left sacroiliac joint pain currently and this is radiating into the left lateral hip.  She denies any new weakness or numbness in the legs.  This is similar to the prior left low back pain that responded well to sacroiliac joint injection.    Current Pain Scales:  Current: 8/10              Average: 7/10  Least-Worst: 4-8/10    Current PEG Score: 7.67     Interval History (05/06/2025):  Margareth Echeverria returns today for follow up.  At the last clinic visit, scheduled left sacroiliac joint therapeutic corticosteroid injection under fluoroscopic guidance with local/MAC sedation.    Bilateral sacroiliac joint therapeutic corticosteroid injection provided 5% relief.     Currently, the low back pain is worse. She continues to have pain to bilateral lower back with radiation into bilateral buttock. Denies any changes in bowel or bladder function. Denies any new weakness or new numbness since the most recent visit. Denies any fevers or recent infections/antibiotics.     She also reports recurrence of pain to coccyx within the  past few weeks. She had excellent relief from prior ganglion impar block and sacrococcygeal joint injection. She also c/o pain to left lateral hip that radiates down lateral thigh, not past the knee.     She has not yet started PT. She did have an MRI of Lumbar spine and MRI Pelvis ordered since prior visit.     She has is taking Lyrica 75mg qHS, states daytime dose caused too much sedation.    Current Pain Scales:  Current: 6/10              Average: 7/10  Least-Worst: 6-10/10           5/6/2025     8:41 AM 5/6/2025     8:40 AM   Last 3 PDI Scores   Pain Disability Index (PDI) 32 32        Current Pain Medications:  Cymbalta 30 mg  Norco 5/325mg   Tizanidine 2 mg   Gabapentin 300 mg   Baclofen 5-10    Blood Thinners: No    Full Medication List:    Current Outpatient Medications:     baclofen (LIORESAL) 10 MG tablet, Take 0.5-1 tablets (5-10 mg total) by mouth daily as needed (pain or spasm)., Disp: 30 tablet, Rfl: 2    cholecalciferol, vitamin D3, 125 mcg (5,000 unit) capsule, Take 5,000 Units by mouth., Disp: , Rfl:     COMPOUND HORMONE REPLACEMENT, Take by mouth once daily. Inject 1 pellet Q 3-4 months to hip, Disp: , Rfl:     DULoxetine (CYMBALTA) 30 MG capsule, Take 1 capsule (30 mg total) by mouth once daily., Disp: 90 capsule, Rfl: 1    lisdexamfetamine (VYVANSE) 20 MG capsule, Take 1 capsule (20 mg total) by mouth every morning., Disp: 30 capsule, Rfl: 0    nortriptyline (PAMELOR) 10 MG capsule, , Disp: , Rfl:     ondansetron (ZOFRAN) 4 MG tablet, Take 1 tablet (4 mg total) by mouth every 8 (eight) hours as needed for Nausea., Disp: 30 tablet, Rfl: 0    ondansetron (ZOFRAN-ODT) 4 MG TbDL, 1 tab(s) orally 3 times a day PRN NAUSEA for 7 days, Disp: , Rfl:     tiZANidine (ZANAFLEX) 4 MG tablet, Take 1 tablet (4 mg total) by mouth every 6 (six) hours as needed (pain and spasm)., Disp: 120 tablet, Rfl: 5    [START ON 5/11/2025] HYDROcodone-acetaminophen (NORCO) 5-325 mg per tablet, Take 1 tablet by mouth every 6  "to 8 hours as needed for Pain., Disp: 105 tablet, Rfl: 0    pregabalin (LYRICA) 50 MG capsule, Take 1 capsule (50 mg total) by mouth 3 (three) times daily., Disp: 90 capsule, Rfl: 5     Review of Systems:  ROS    Allergies:  Opioids - morphine analogues, Morphine, and Tramadol     Medical History:   has a past medical history of ADHD (attention deficit hyperactivity disorder), Adult hypophosphatasia, AV block, Fibromyalgia, IBS (irritable bowel syndrome), IC (interstitial cystitis), Occipital neuralgia, and Trigeminal neuralgia.    Surgical History:   has a past surgical history that includes Inguinal hernia repair (Bilateral); Laparoscopic cholecystectomy; Augmentation of breast; Fulguration of endometriosis (07/05/2016); Laparoscopy-assisted vaginal hysterectomy (11/11/2019); Laparoscopy (12/08/2019); Appendectomy; Cystoscopy with hydrodistension and biopsy of bladder (12/28/2020); Removal of breast implant (04/2021); Hysterectomy; and Breast surgery (Bilateral).    Family History:  family history includes Breast cancer in her maternal cousin; Lung cancer in her paternal grandmother; No Known Problems in her father and mother.    Social History:   reports that she quit smoking about 13 years ago. Her smoking use included cigarettes. She started smoking about 23 years ago. She has a 5 pack-year smoking history. She has never been exposed to tobacco smoke. She has never used smokeless tobacco. She reports current alcohol use. She reports current drug use. Drug: Marijuana.    Physical Exam:  /73   Pulse 89   Ht 5' 4" (1.626 m)   Wt 53.1 kg (117 lb)   SpO2 99%   BMI 20.08 kg/m²   GEN: No acute distress. Calm, comfortable  HENT: Normocephalic, atraumatic, moist mucous membranes  EYE: Anicteric sclera, non-injected.   CV: Non-diaphoretic. Regular Rate. Radial Pulses 2+.  RESP: Breathing comfortably. Chest expansion symmetric.  EXT: No clubbing, cyanosis.   SKIN: Warm, & dry to palpation. No visible rashes or " lesions of exposed skin.   PSYCH: Pleasant mood and appropriate affect. Recent and remote memory intact.   GAIT: Independent, normal ambulation  Neck Exam:       Inspection: No erythema, bruising.       Palpation: (+) TTP of left cervical paraspinals and over SHANELLE and DANY      ROM: No significant Limitation in flexion, extension, lateral bending or rotation      Provocative Maneuvers:  (-) Spurling's bilaterally  Lumbar Spine Exam:       Inspection: No bruising, swelling. Slight erythema noted at bilateral injection sites.        Palpation: No calor to the area. (+) TTP of b/l SIJ > lumbar paraspinals b/l. (+) TTP of sacrum and coccyx         ROM:  Limited in flexion, extension, lateral bending.       (+) Facet loading bilaterally      (-) Straight Leg Raise bilaterally      (+) ISABELLA bilaterally      (+) SIJ Compression Test bilaterally      (+) Gaenslan's Test bilaterally      (+) Thigh thrust/Posterior Pelvic Pain Provocation Test bilaterally      (+) Sacral thrust  Hip/Pelvis Exam:      Inspection: No gross deformity or apparent leg length discrepancy      Palpation: (+) TTP along inguinal ligaments b/l, pubic symphysis. (+) TTP right ischial bursa and b/l piriformis and gluteal muscles, (-) TTP to bilateral greater trochanteric bursas.       ROM:  No limitation or pain in internal rotation, external rotation b/l  Neurologic Exam:     Alert. Speech is fluent and appropriate.     Strength: 4/5 in left AbDM, wrist extension, 4/5 diffusely in the left leg, otherwise 5/5 throughout bilateral upper and lower extremities     Sensation:  Grossly intact to light touch in bilateral upper and lower extremities     Reflexes: 2+ in b/l BR, biceps, triceps, patella, achilles     Tone: No abnormality appreciated in bilateral lower extremities     No Clonus     Downgoing toes on plantar stimulation     (-) Franco bilaterally           Imaging:  - MRI Lumbar Spine 4/25/25:  Alignment is within normal limits. Intervertebral  discs are maintained. Bone marrow signal is unremarkable. There is no abnormal contrast enhancement. The spinal cord terminates at L2. Paraspinal soft tissues are without acute abnormality.     L1-L2: Unremarkable.     L2-L3: Unremarkable.     L3-L4: Mild bilateral facet arthropathy.     L4-L5: Severe bilateral facet arthropathy.     L5-S1: Moderate bilateral facet arthropathy.     - MRI Plevis w & w/o contrast 4/25/25:  Bone marrow signal is normal. The coccyx is intact. No evidence of displaced fracture.     The sacroiliac joints are intact. No evidence of sacroiliitis, sacroiliac osteoarthritis, or sacroiliac joint effusion. No evidence of pathologic enhancement about the sacroiliac joints.     Mild bilateral hamstring origin tendinosis. The femoral heads are well formed and seated within well formed acetabula. There is no muscle atrophy or edema.       - MRI C-spine 11/21/23:      - X-Ray Cervical Spine AP & Lateral 10/13/23  There is some reversal of the normal cervical lordosis.  Vertebral body heights are within normal limits.  No definite spondylolisthesis demonstrated.  Mild-to-moderate disc height loss noted at C5-6.  Mild disc height loss at C4-5. Posterior elements appear intact without acute fractures or subluxations demonstrated.  Odontoid process appears intact.  Atlantoaxial articulations appear normal.  Prevertebral soft tissues are within normal limits.  Postoperative changes are noted involving the skull on the left in the suboccipital region region.     Impression:     Degenerative findings as above    - MRI L-spine 6/13/23:  Vertebral body alignment is preserved. Vertebral body heights are maintained. Bone marrow signal is within normal limits. The conus medullaris terminates normally at the level of L2. Lumbar nerve roots have normal appearance. Preserved intervertebral disc space height and signal.    T12-L1: No significant canal or foraminal stenosis.    L1-L2: No significant canal or  foraminal stenosis.    L2-L3: No significant canal or foraminal stenosis.    L3-L4: No significant canal or foraminal stenosis.    L4-L5: Broad-based disc bulge, ligamentum flavum thickening, facet hypertrophy with at most mild canal stenosis. Mild effacement of the lateral recesses. No significant foraminal stenosis.    L5-S1: No significant canal or foraminal stenosis.        - MRI Pelvis w.o 6/13/23:  The femoral heads are well formed and seated within well formed acetabula. Bone marrow signal is normal. The sacroiliac joints are intact. There is no muscle atrophy or edema. There is no intrapelvic visceral abnormalities. Prior hysterectomy. There is no evidence of fluid collection. There is mild right hamstring origin tendinosis. The gluteus medius and minimus tendon insertions are intact. The hip adductors are intact. There is no evidence of iliopsoas or trochanteric bursitis.    Impression:    Mild right hamstring origin tendinosis. Otherwise, negative pelvic MRI.      - X-ray lumbar spine 6/8/23:  Transitional anatomy with sacralization of L5.  Vertebral body heights maintained.  No spondylolisthesis.  Disc spaces maintained.  Mild facet arthropathy.  No acute osseous abnormality.  Bilateral SI joint degenerative changes.  Constipation suspected.    - X-ray pelvis 6/8/23:  Hip joint spaces maintained.  Likely transitional anatomy with L5 sacralization.  Symmetric degenerative findings of the bilateral SI joints.  Constipation findings noted.    - Pelvis CT w/o contrast 1/6/23:  The visualized gastrointestinal tract appears normal.  Urinary bladder is normal.  Inguinal regions are normal.  No masses, fluid collections or adenopathy seen.  1. There is a cystic area on the left side of the pelvis measuring about 5.8 cm. This has a simple cystic appearance to it. Statistically this most likely represents a ovarian cyst. The patient's uterus has apparently beenremoved.  The bony elements appear  intact.  Impression:  1. Cyst on the left side of the pelvis. Statistically most likely represents an ovarian cyst. Evaluation with pelvic ultrasound is recommended.    - Pelvic US 12/21/22:  Transabdominal and transvaginal pelvic ultrasound was performed by the sonographer. Static images are submitted. Uterus is not visualized consistent with patient's history of previous partial hysterectomy. Right  ovary is not visualized. Patient provides a history of previous right oophorectomy. Left ovary measures 3.4 x 3.9 x 2.9 cm in size. Multiple left ovarian follicles are visualized. Small left ovarian cyst is visualized measuring 1.8 cm in diameter. Left ovarian blood flow is present. Trace amount of pelvic free fluid is present adjacent to the vaginal cuff.  IMPRESSION:  1. Nonvisualization of the uterus and right ovary consistent with patient's surgical history.  2. Small 1.8 cm left ovarian cyst.  3. Trace amount of pelvic free fluid.    - MRI C-spine 9/26/22:  Reversal of the normal cervical lordotic curvature. The vertebral body heights and alignment are maintained throughout the cervical spine. No abnormal vertebral body marrow signal. Multilevel intervertebral disc desiccation.  Spinal cord is normal in caliber and demonstrates normal signal. The visualized portions of the cervicomedullary junction are unremarkable.  C2-C3: Normal intervertebral disc contour. No central canal or neural foraminal narrowing.  C3-C4: Intervertebral disc bulge narrows the ventral subarachnoid space. No central canal or neural foraminal narrowing.  C4-C5: Intervertebral disc bulge eccentric to the right side. There is narrowing of the ventral subarachnoid space. The central canal is borderline measuring 10 mm. Moderate right and mild left neural foraminal narrowing.   C5-C6: Intervertebral disc bulge with superimposed left paracentral disc protrusion. There is narrowing of the ventral subarachnoid space and left subarticular recess.  Bilateral uncovertebral and facet hypertrophy. Moderate bilateral neural foraminal narrowing. The central canal is narrowed to 8 mm.  C6-C7: Intervertebral disc bulge narrows the ventral subarachnoid space. The central canal is narrowed to 9 mm. No neural foraminal narrowing.  C7-T1: Normal intervertebral disc contour. No central canal or neural foraminal narrowing.  The paravertebral soft tissues are unremarkable.  IMPRESSION:  Multilevel cervical spondylosis most prominent at C4-7. The findings are not significantly changed when compared to the prior exam.    - MRI Lumbar spine 8/17/21:  Alignment: Normal.  Vertebral bodies: Normal height and marrow signal.  T12-L1: No significant spinal canal stenosis or neuroforaminal narrowing.  L1-L2: No significant spinal canal stenosis or neuroforaminal narrowing.  L2-L3: No significant spinal canal stenosis or neuroforaminal narrowing.  L3-L4: No significant spinal canal stenosis or neuroforaminal narrowing.  L4-L5: No significant spinal canal stenosis or neuroforaminal narrowing.  L5-S1: No significant spinal canal stenosis or neuroforaminal narrowing.  Spinal cord: The cord extends down to the L1 level. The cord has a normal size, configuration and signal pattern.    - MRI Thoracic spine w/o 6/15/20:  FINDINGS:  Vertebral bodies: Normal vertebral body height, alignment and marrow signal.  C7-T1: The disc is normal. The central canal and neural foramen appear normal. No displacement or compression of the neural elements are seen.  T1-T2: The disc has a normal contour. The central canal and neural foramen appear normal. No displacement or compression of the neural elements are seen.  T2-T3: The disc has a normal contour. The central canal and neural foramen appear normal. No displacement or compression of the neural elements are seen.  T3-T4: The disc has a normal contour. The central canal and neural foramen appear normal. No displacement or compression of the neural  elements are seen.  T4-T5: The disc has a normal contour. The central canal and neural foramen appear normal. No displacement or compression of the neural elements are seen.  T5-T6: The disc has a normal contour. The central canal and neural foramen appear normal. No displacement or compression of the neural elements are seen.  T6-T7: The disc has a normal contour. The central canal and neural foramen appear normal. No displacement or compression of the neural elements are seen.  T7-T8: The disc has a normal contour. The central canal and neural foramen appear normal. No displacement or compression of the neural elements are seen.  T8-T9: The disc has a normal contour. The central canal and neural foramen appear normal. No displacement or compression of the neural elements are seen.  T9-T10: The disc has a normal contour. The central canal and neural foramen appear normal. No displacement or compression of the neural elements are seen.  T10-T11: The disc has a normal contour. The central canal and neural foramen appear normal. No displacement or compression of the neural elements are seen.  T11-T12: The disc has a normal contour. The central canal and neural foramen appear normal. No displacement or compression of the neural elements are seen.  T12-L1: The disc has a normal contour. The central canal and neural foramen appear normal. No displacement or compression of the neural elements are seen.  Spinal cord: The cord has a normal size, configuration and signal pattern.  Additional findings: None seen.      Labs:  BMP  Lab Results   Component Value Date     05/06/2025    K 3.7 05/06/2025     05/06/2025    CO2 29 05/06/2025    BUN 14.7 05/06/2025    CREATININE 0.69 05/06/2025    CALCIUM 9.4 05/06/2025    ANIONGAP 9.0 05/06/2025     Lab Results   Component Value Date    AST 19 05/06/2025     Lab Results   Component Value Date     08/16/2023       Assessment:  Margareth Echeverria is a 45 y.o. female  with the following diagnoses based on history, exam, and imagin. Lumbar spondylosis    2. Chronic pain syndrome  -     Comprehensive Metabolic Panel; Future; Expected date: 2025    3. Long term (current) use of opiate analgesic  -     Comprehensive Metabolic Panel; Future; Expected date: 2025    4. Chronic bilateral low back pain without sciatica    5. Sacroiliac joint dysfunction    6. Nontraumatic coccydynia            This is a pleasant 45 y.o. lady presenting with:     - Chronic pelvic pain: Pain appears multifactorial and she has a complex history with prior hysterectomy for endometriosis, interstitial cystitis, b/l oophorectomy for ovarian cyst, prior bilateral inguinal hernia repair and hypophosphatasia.    - Coccydynia currently primary painful area and is what is limiting her function the most.    - Improved   - Neck pain and cervicogenic HA and left radicular arm pain. Appears to have occipital neuralgia on left which improved after occipital nerve blocks. She also has some myofascial pain on exam.    - Left radicular arm pain improved w/ OLEGARIO    - Patient had > 50% relief relief for > 3 months with noted improved function measured on PEG scale with prior ALEKSANDR.     - Patient with moderate to severe chronic neck pain that is predominantly axial with radicular pain that resolved with ALEKSANDR, but axial pain persists.. The pain is severe enough to greatly impact quality of life &/or function as evidenced on the patients PEG score and NRS.    - Pain persists for greater than 3 months despite conservative treatment, including: Activity modification (avoiding exacerbating factors), physical therapy, and oral medications, and HEP.    - Facet joints currently suspected as primary pain generator based on clinical assessment & radiology studies, as there is no fracture, tumor, infection, and significant deformity. There is NO surgical fusion (such as Anterior Lumbar Interbody Fusion) at the spinal  segment to be targeted..   - This facet level has not undergone ablation in the previous 2 years.    - Patient reports greater than or equal to 80% relief from previous MBB at this segment with preprocedure pain score noted as 10/10 and post-procedure pain score noted as 2/10 with consistent relief for duration of local anesthetic (3-4 hrs).   - Chronic bilateral low back pain: Pain appears primarily due to SIJ dysfunction on exam, but may have facet and myofascial contributions as well.    - Back pain improved after bilateral SIJ CSI initially, but not after subsequent injection.   - Patient with moderate to severe chronic low back pain that is predominantly axial with no radicular component or neurogenic claudication.. The pain is severe enough to greatly impact quality of life &/or function as evidenced on the patients functional/disability score and NRS.    - Pain persists for greater than 3 months despite conservative treatment, including: Activity modification (avoiding exacerbating factors), physical therapy, physician-led home exercise program, and oral medications.    - Facet joints currently suspected as primary pain generator based on clinical assessment & radiology studies, as there is no fracture, tumor, infection, and significant deformity. There is NO surgical fusion (such as Anterior Lumbar Interbody Fusion) at the spinal segment to be targeted..   - This facet level has not undergone ablation in the previous 2 years.   - History of fibromyalgia.   - History of familial hypophosphatasia and h/o polyarthralgia   - Comorbidities: Depression. ADHD. Hypophosphatasia. H/o Trigeminal neuralgia. IBS w/ constipation. H/o AV block. Former smoker. Allergy to opioids.     Treatment Plan:   - PT/OT/HEP: Refer to PT, pending   - Cont HEP for neck and back pain.    - Discussed benefits of exercise for pain.   - Procedures: Schedule diagnostic medial branch block of bilateral L4/5 & L5/S1 facet joints under  fluoroscopic guidance with local sedation. (CPT Codes 33695, 68033, KX modifier & 50 modifier). If MBB successful x 2, plan for RFA. The patient is not allergic to iodinated contrast. Patient is not currently taking blood thinners for cardiovascular prophylaxis   - Consider left GTB CSI if no relief with conservative treatment.   - Can repeat ganglion impar block & sacrococcygeal joint corticosteroid injection PRN  - If neck pain worsens, can schedule diagnostic medial branch block (#2/2) of the bilateral C3-4 & C4-5 facet joints under fluoroscopic guidance with local sedation. (CPT Codes 76582, 60436, KX modifier & 50 modifer). If MBB successful x 2, plan for RFA. The patient is not allergic to iodinated contrast. Patient is not currently taking blood thinners for CV prophylaxis.  - Can repeat C7-T1 IL ALEKSANDR PRN if arm pain worsens  - Consider b/l superior hypogastric plexus block for chronic pelvic pain  - Medications:   - Change to Lyrica 50mg TID.  reviewed.    - Cont Norco 5/325 mg q 6-8 hrs PRN pain (#105 per month).    - Goal to be down to 3/d at next refill. Going slow.    - Next refill plan for TID  - Cont baclofen 5-10 mg daily PRN. Try to switch one hydrocodone pill for a baclofen tablet   - Cont tizanidine to 4mg in AM, 4 mg mid-day and 8 mg qHS.    - Cont cymbalta 30 mg daily (didn't tolerate 60 mg)   - Pain contract signed 08/16/2023    -  reviewed  - Imaging: Reviewed.   - Labs: Reviewed.        Follow Up: RTC ASAP after MBB or sooner PRN    I spent a total of 65 minutes on the day of the visit.This includes face to face time and non-face to face time preparing to see the patient (eg, review of tests), obtaining and/or reviewing separately obtained history, documenting clinical information in the electronic or other health record, independently interpreting results and communicating results to the patient/family/caregiver, or care coordinator.        Ashli Cates PA-C  Interventional Pain  Medicine

## 2025-05-07 NOTE — PATIENT INSTRUCTIONS
Pre-Procedural Instructions  Interventional Pain  Medial Branch Block    Purpose:  We are performing diagnostic blocks of particular nerves in order to determine if performing a radiofrequency ablation (burning) of those nerves will provide relief of your pain.   The relief is temporary and used to determine the next step in your treatment  Please provide honest results in the amount of pain relief you obtained. Don't worry, you will not hurt our feelings.  We want to help relieve your pain and can potentially target different structures in order to provide you better relief.   Informed Consent:  These procedures are safe, but like any interventional procedure, it does carry rare risks which include:  Infection   Bleeding  Allergic Reactions  No relief of pain  Temporary sense of imbalance/dizziness can occur when performing cervical medial branch blocks, especially of the third occipital nerve.     Preparing for the procedure:    Tell your healthcare provider about all medicines you take. This includes over-the-counter medicines, herbs, vitamins, and other supplements.  Tell your healthcare provider about any other medical or dental procedures planned in proximity to your procedure.   Reduce Infection Risk:   Shower or bathe the night before and morning of your scheduled procedure.  Notify clinic if you are:  Having signs of illness, such as fevers, or signs of a urinary tract infection (UTI)  Prescribed antibiotics for an infection  Reduce bleeding risk:  For 7 days prior to procedure and during the duration of the trial (until your clinic follow-up):  Stop NSAIDs (ibuprofen/Advil, naproxen/Aleve, Meloxicam/Mobic, Goody's, or others)  Stop Demetria Taylor, Pepto Bismol, & Herbal Medication/Supplements (such as Ginko, high dose Vitamin E, Fish Oil, Turmeric, Garlic, and others)  Home Support:  You MUST have a responsible  to bring you home from the facility and assist you at home on the day of the procedure    Plan to not be at work on the day of the procedure.   Medications:  Blood thinners: Such as: Aspirin, Plavix, Warfarin (Coumadin), Eliquis, Pradaxa, Xarelto, & others  If you are taking blood thinning medications, the clinic will notify you if you need to hold and of the number days to hold the medication prior to the procedure and when to restart these after the procedure. This will be communicated with and approved by your prescribing physician.   Please notify the office if you are taking blood thinning medications and are unsure if or when you need to hold the medication.   GLP-1 agonists: Semaglutide (Ozempic, Wegovy, Rybelsus), Tirzepatide (Mounjaro, Zepbound), Dulaglutide (Trulicity), Liraglutide, Lixisenatide, Exenatide  These medications can increase the risk of regurgitation and pulmonary aspiration of gastric contents during general anesthesia and deep sedation  If you take this weekly, please hold for 1 week prior to the procedure  If you take this daily, please hold on the day of procedure  If these are utilized for blood sugar control, you will need to discuss with your managing provider on what to utilize for bridging the antidiabetic therapy to maintain blood sugar control and avoiding hyperglycemia  Please take other regular medications as scheduled.  Diet:  If you will be receiving sedation for the procedure.  Do not eat anything for 8 hours prior to your procedure.   You may drink clear liquids up to 4 hours prior to your procedure.   Clear liquids include: water, black coffee, fruit juice without pulp, clear tea  You may drink water up to 2 hours prior to your procedure.  You may use a sip of water to take your regular medications  If you are NOT receiving sedation for the procedure:  Do not eat anything for 2 hours prior to your procedure. You may eat a light meal more than 2 hours prior to your procedure.   You may use a sip of water to take your regular medications  For Diabetic  Patients:  Please discuss with your managing physician the best way to take your medications on the procedure day to minimize large increases or decreases in your blood sugar  Please notify clinic of your most recent A1c and when that was performed. Optimizing your blood sugar control prior to the procedure will help decrease your risk of complications, such as infection.   Notify clinic and we will attempt to schedule your procedure as early in the morning as possible to minimize hypoglycemia that may occur while fasting for the procedure.  Please inform clinic if: Dr. Katharine Steve's clinic phone number is [phone]  You are pregnant or attempting to become pregnant  You have an implanted electronic device (pacemaker, defibrillator, medication pump, stimulator, etc.)  The electrical current utilized to coagulate the nerves can damage, disrupt or potentially cause a discharge of the electronic device.  You are having signs of infection noted above or are started on antibiotics.  You are allergic to iodinated contrast agents, local anesthetics, or steroids.  You are having other medical procedures around the time of your procedure (within 2 weeks)    Instructions for procedure day:    If you are not having any pain in the area that is going to be evaluated with the procedure, please call and cancel the procedure. The block will not provide valuable information if you are not having any pain.   We ask that you please leave your valuables at home  Avoid moisturizers or scented personal products  Wear loose fitting clothing that you can easily change in & out of  Plan to not be at work on the day of the procedure.   Please remove jewelry.  If you are having a procedure done on your head or neck, please remove any metallic items or hardware, such as dental hardware, jewelry that may obscure the images of the area during the procedure.     After the procedure: You will be sent to a recovery room after the procedure.  You will go home the same day.     Home Care Instructions after the procedure:    Injection site(s)  The injection sites may be covered with a dressing.  You may remove bandage at discharge from the hospital.   Bruising may be present  Avoid soaking or bathing in hot tub, bath or pool on the day of your procedure.   Showering is okay the day of procedure.   Avoid heat to the area  Notify the clinic if you are experiencing increased bleeding or drainage from the injection site(s)  Pain  Mild-moderate pain/discomfort may occur at the injection sites  Pain may be relieved with:  Ice  Tylenol (Acetaminophen)  Injection site pain typically improves after a day  Your baseline pain may improve immediately after the procedure:  Assess and note in your diary on a scale of 0 - 10/10, the intensity of pain at multiple time intervals on the day of your procedure.  Activity/Diet:  Day of the procedure:  Do NOT drive or operate heavy machinery  Resume daily life activities as tolerated. Do this slowly and carefully.  Bed rest is NOT recommended  Avoid strenuous activity, heavy lifting, bending or twisting.   Åvoid activity that requires skill, dexterity or coordination  As pain improves, you can carefully attempt activities that would exacerbate your pain and assess any functional benefits from the procedure.   If you received sedation - For 24 hrs following the procedure DO NOT:   Drive or operate heavy machinery  Drink alcohol  Make any important decisions  Dizziness, vertigo or balance difficulties may occur when having the procedure on the cervical spine (the neck). This happens because the procedure anesthetizes the proprioceptors (signals informing where you are in space)  This can be worsened when looking down or looking sideways  You can help compensate for this by utilizing your visual cues. Always focus on horizontal objects, such as window frames, door frames, or the horizon itself.  This typically improves within  15-30 minutes  Day after the procedure:   Resume daily life activities as tolerated: Let pain be your guide. Progress slowly and try not to over exert yourself if you are feeling good.   If possible, avoid activities that exacerbate your pain  Bed rest is NOT recommended   Physical Therapy (PT): You may restart your home exercise program the day following your procedure.  Diet: You may resume your normal diet as tolerated after the procedure  If nauseated, hold solid foods until nausea has resolved  Medications:  If you held any blood thinner medications for the procedure, you should have been given a specific timeline on when to restart the medication that has been determined in collaboration with your prescriber and our clinic. If you do not know when to restart, please notify clinic.  You may continue all other regular medications as prescribed.     When to call your healthcare provider [phone]  Call your healthcare provider if you have any of these symptoms:  Fever of 100.4°F (38.0°C) or higher. If you feel hot, take your temperature before notifying clinic.  New pain, weakness, tingling, or numbness in your legs. This may be expected in the first several hours after the procedure due to the local anesthetic used during the procedure.   New or worsening back pain   Redness, drainage or bleeding from site  Changes in bowel (incontinence) function  Changes in bladder (incontinence or retention) function  New or unusual headache &/or neck stiffness  Persistent nausea or vomiting with inability to tolerate clear liquids for more than 12 hours       When to seek medical attention  Call 911 right away if you have any of the following:  Chest pain  Shortness of breath  Signs of a stroke: Sudden changes in vision, changes in speech, sudden weakness in your face/arms/legs  Any other medical emergency     Follow-up: Post-procedure clinic appointment: ASAP after procedure to document benefit of procedure        Katharine Steve M.D.  [facility] Interventional Pain Medicine  Phone: [phone]

## 2025-05-09 ENCOUNTER — TELEPHONE (OUTPATIENT)
Dept: PAIN MEDICINE | Facility: CLINIC | Age: 45
End: 2025-05-09
Payer: COMMERCIAL

## 2025-06-10 DIAGNOSIS — G89.4 CHRONIC PAIN SYNDROME: ICD-10-CM

## 2025-06-10 DIAGNOSIS — Z79.891 LONG TERM (CURRENT) USE OF OPIATE ANALGESIC: ICD-10-CM

## 2025-06-11 RX ORDER — HYDROCODONE BITARTRATE AND ACETAMINOPHEN 5; 325 MG/1; MG/1
1 TABLET ORAL EVERY 8 HOURS PRN
Qty: 90 TABLET | Refills: 0 | Status: SHIPPED | OUTPATIENT
Start: 2025-06-11 | End: 2025-07-11

## 2025-06-18 ENCOUNTER — TELEPHONE (OUTPATIENT)
Facility: CLINIC | Age: 45
End: 2025-06-18
Payer: COMMERCIAL

## 2025-06-23 ENCOUNTER — TELEPHONE (OUTPATIENT)
Facility: CLINIC | Age: 45
End: 2025-06-23
Payer: COMMERCIAL

## 2025-06-24 ENCOUNTER — TELEPHONE (OUTPATIENT)
Facility: CLINIC | Age: 45
End: 2025-06-24
Payer: COMMERCIAL

## 2025-07-10 ENCOUNTER — PATIENT MESSAGE (OUTPATIENT)
Dept: PAIN MEDICINE | Facility: CLINIC | Age: 45
End: 2025-07-10
Payer: COMMERCIAL

## 2025-07-10 DIAGNOSIS — Z79.891 LONG TERM (CURRENT) USE OF OPIATE ANALGESIC: ICD-10-CM

## 2025-07-10 DIAGNOSIS — G89.4 CHRONIC PAIN SYNDROME: ICD-10-CM

## 2025-07-11 RX ORDER — HYDROCODONE BITARTRATE AND ACETAMINOPHEN 5; 325 MG/1; MG/1
1 TABLET ORAL EVERY 8 HOURS PRN
Qty: 90 TABLET | Refills: 0 | Status: SHIPPED | OUTPATIENT
Start: 2025-07-11 | End: 2025-08-10

## 2025-08-07 DIAGNOSIS — G89.4 CHRONIC PAIN SYNDROME: ICD-10-CM

## 2025-08-07 DIAGNOSIS — Z79.891 LONG TERM (CURRENT) USE OF OPIATE ANALGESIC: ICD-10-CM

## 2025-08-07 RX ORDER — HYDROCODONE BITARTRATE AND ACETAMINOPHEN 5; 325 MG/1; MG/1
1 TABLET ORAL EVERY 8 HOURS PRN
Qty: 90 TABLET | Refills: 0 | OUTPATIENT
Start: 2025-08-07 | End: 2025-09-06

## 2025-08-08 ENCOUNTER — OFFICE VISIT (OUTPATIENT)
Dept: PAIN MEDICINE | Facility: CLINIC | Age: 45
End: 2025-08-08
Payer: COMMERCIAL

## 2025-08-08 VITALS
DIASTOLIC BLOOD PRESSURE: 74 MMHG | BODY MASS INDEX: 19.97 KG/M2 | SYSTOLIC BLOOD PRESSURE: 111 MMHG | HEART RATE: 68 BPM | HEIGHT: 64 IN | WEIGHT: 117 LBS

## 2025-08-08 DIAGNOSIS — M47.816 LUMBAR SPONDYLOSIS: Primary | ICD-10-CM

## 2025-08-08 DIAGNOSIS — G89.4 CHRONIC PAIN SYNDROME: ICD-10-CM

## 2025-08-08 DIAGNOSIS — Z79.891 LONG TERM (CURRENT) USE OF OPIATE ANALGESIC: ICD-10-CM

## 2025-08-08 DIAGNOSIS — M54.50 CHRONIC BILATERAL LOW BACK PAIN WITHOUT SCIATICA: ICD-10-CM

## 2025-08-08 DIAGNOSIS — G89.29 CHRONIC BILATERAL LOW BACK PAIN WITHOUT SCIATICA: ICD-10-CM

## 2025-08-08 RX ORDER — UBROGEPANT 100 MG/1
TABLET ORAL
COMMUNITY
Start: 2025-06-05

## 2025-08-08 RX ORDER — MELOXICAM 15 MG/1
TABLET ORAL
COMMUNITY
Start: 2025-06-17

## 2025-08-08 RX ORDER — GALCANEZUMAB 120 MG/ML
INJECTION, SOLUTION SUBCUTANEOUS
COMMUNITY
Start: 2025-06-20

## 2025-08-08 RX ORDER — AMITRIPTYLINE HYDROCHLORIDE 10 MG/1
10 TABLET, FILM COATED ORAL NIGHTLY PRN
COMMUNITY

## 2025-08-08 RX ORDER — HYDROCODONE BITARTRATE AND ACETAMINOPHEN 5; 325 MG/1; MG/1
1 TABLET ORAL EVERY 8 HOURS PRN
Qty: 90 TABLET | Refills: 0 | Status: SHIPPED | OUTPATIENT
Start: 2025-08-10 | End: 2025-09-09

## 2025-08-08 NOTE — PROGRESS NOTES
Pain Medicine      Chief Complaint:   Chief Complaint   Patient presents with    Low-back Pain       History of Present Illness: Margareth Echeverria is a 45 y.o. female referred by Dr. Nelda Thornton for Pelvic pain.      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 and she has a history of chronic pelvic pain and LBP with prior hysterectomy and b/l oophorectomy and she was feeling pretty good in regards to her chronic pelvic pain until Mother's day where she developed rather sudden increase in her pelvic pain with no clear inciting event  Location: involves her entire pelvis, but seems to be primarily localized over her sacrum area  Radiation: She feels some radiation into her anterior thighs  Timing: constant  Quality: Aching, Throbbing, Pounding, Deep, Stabbing, and nagging and swelling  Exacerbating Factors: Bowel movements, valsalva, intercourse, sitting, standing prolonged  Alleviating Factors: nothing  Associated Symptoms: She has neuropathy in her legs/feet from prior to this incident. She feels weakness in her legs. She has chronic constipation, but has been having daily BMs. She does note recent weight loss (states about 30 lbs). She does note night sweats. She denies fevers, urinary incontinence/change in function and bowel incontinence/change in function    Nothing seems to be helping, she has tried heating, cold. She saw gynecology and was sent here and to pelvic floor rehab.     She denies aggravation with urination.   Severity: Currently: 8/10   Typical Range: 5-10/10     Exacerbation: 10/10     P = 8  E = 10  G = 10  Baseline PEG Score = 9.33    Opioid Risk Score         Value Time User    Opioid Risk Score  7 6/8/2023 10:19 AM Katharine Steve MD          Previous Therapies:  PT/OT: yes   Relevant Surgery: yes    - surgery for trigeminal neuralgia  Previous Medications:   - NSAIDS:   - Muscle Relaxants: Zanaflex 2mg   - TCAs:   -  What Type Of Note Output Would You Prefer (Optional)?: Standard Output Hpi Title: Evaluation of Skin Lesions SNRIs: cymbalta  - Topicals: CBD  - Anticonvulsants:  gabapentin made her feel drunk  - Opioids: hydrocodone      Previous Interventions:  - 4/21/25: Bilateral SIJ CSI w/ limited relief.   -4/3/25: Left DANY & SHANELLE block w/ excellent relief.  - 11/19/24: ganglion impar and sacrococcygeal joint injection w/ 100% relief ongoing  - 11/12/24: Left DANY and SHANELLE w/ excellent relief for 3-4 months  - 10/21/24: Bilateral C3-4 & C4-5 diagnostic MBB (#1/2) provided 80% relief (Pain scale went from 10/10 pre-procedure to post-procedure pain of 2/10 during anesthetic effect for 3-4 hrs following procedure).   - 8/26/24: C7-T1 IL ALEKSANDR w/ 75% relief of arm pain   - 1/8/24: C7-T1 IL ALEKSANDR w/ 80% relief for > 6 months  - 8/16/23: left DANY and SHANELLE block in clinic with US guidance w/ 4 mg/mL dexamethasone and lido 1% which provided nearly complete relief.   - 6/23/23: B/L SIJ CSI w/ > 50% relief in pain and improved fxn.   - Procedures for neck and back pain, but not for pelvic pain    Interval History (08/16/2023):  She is mostly having HA pain over the left side of her scalp. Pain radiates from the occiput to her parietal region and is constant and has been present for days. Stress seems to be aggravating it. She states she has occipital neuralgia and typically gets injections of her occipital nerves which helps her HA. Pain has been worsening because she has been under a lot of stress. Her current physician managing her pain medicine, Dr. Anaya, has discharged her from clinic for no reason due to her having a procedure (SIJ CSI) with this clinic which helped her. She did nothing to breach her pain contract, but she has been having more difficulty with sleeping and worsening pain with all the stress he has been putting her through. She is prescribed hydrocodone/APAP 10/325 mg QID PRN and states she was not needing to take it as often until he discharged her out of the blue, just because she had her procedure done with this clinic  How Severe Are Your Spot(S)?: moderate instead of with the interventional pain physicians at the clinic that Dr. Anaya works.     Interval History (10/13/2023):  Neck pain: She is mostly having the pain in the left cervico-occipital region and this radiates into the left trapezius and down the left arm with numbness and tingling into the left hand. She denies any clear changes in strength recently. She denies changes in b/b function.     Interval History (08/08/2025):  Margareth Echeverria returns today for follow up.  At the last clinic visit, changed to Lyrica 50mg TID. Cont medications. Schedule Bilateral L4/5 and L5/S1 Diagnostic MBB    She did not have above procedure, was postponed due to illness (Mono).    Currently, the low back pain is stable.  Continues with pain to bilateral lower back without radiation into lower extremities. She reports left lateral hip pain has improved. Denies any changes in bowel or bladder function. Denies any new weakness or new numbness since the most recent visit. Denies any fevers or recent infections/antibiotics.     Patient is currently taking Lyrica, Tizanidine, Baclofen, Cymbalta, and Norco which provides 55% relief.  Denies side effects such as sedation or constipation.     She is currently taking Lyrica 50mg in daytime and 75mg qHS, this is providing relief without daytime dose causing excess sedation.     Current Pain Scales:  Current: 7/10              Average: 5/10  Least-Worst: 4-7/10           8/8/2025     1:53 PM 5/6/2025     8:41 AM 5/6/2025     8:40 AM   Last 3 PDI Scores   Pain Disability Index (PDI) 25 32 32          Current Pain Medications:  Cymbalta 30 mg  Norco 5/325mg   Tizanidine 2 mg   Gabapentin 300 mg   Baclofen 5-10  Elavil 10mg qHS    Blood Thinners: No    Full Medication List:    Current Outpatient Medications:     amitriptyline (ELAVIL) 10 MG tablet, Take 10 mg by mouth nightly as needed for Insomnia., Disp: , Rfl:     cholecalciferol, vitamin D3, 125 mcg (5,000 unit) capsule, Take 5,000  Have Your Spot(S) Been Treated In The Past?: has not been treated Units by mouth., Disp: , Rfl:     COMPOUND HORMONE REPLACEMENT, Take by mouth once daily. Inject 1 pellet Q 3-4 months to hip, Disp: , Rfl:     EMGALITY  mg/mL PnIj, INJECT 2 PEN INJECTORS SUBCUTANEOUSLY FOR 1 DAY, Disp: , Rfl:     HYDROcodone-acetaminophen (NORCO) 5-325 mg per tablet, Take 1 tablet by mouth every 8 (eight) hours as needed for Pain., Disp: 90 tablet, Rfl: 0    lisdexamfetamine (VYVANSE) 20 MG capsule, Take 1 capsule (20 mg total) by mouth every morning., Disp: 30 capsule, Rfl: 0    meloxicam (MOBIC) 15 MG tablet, TAKE 1 TABLET BY MOUTH EVERY DAY WITH MEALS, Disp: , Rfl:     nortriptyline (PAMELOR) 10 MG capsule, , Disp: , Rfl:     ondansetron (ZOFRAN) 4 MG tablet, Take 1 tablet (4 mg total) by mouth every 8 (eight) hours as needed for Nausea., Disp: 30 tablet, Rfl: 0    ondansetron (ZOFRAN-ODT) 4 MG TbDL, 1 tab(s) orally 3 times a day PRN NAUSEA for 7 days, Disp: , Rfl:     pregabalin (LYRICA) 50 MG capsule, Take 1 capsule (50 mg total) by mouth 3 (three) times daily., Disp: 90 capsule, Rfl: 5    tiZANidine (ZANAFLEX) 4 MG tablet, Take 1 tablet (4 mg total) by mouth every 6 (six) hours as needed (pain and spasm)., Disp: 120 tablet, Rfl: 5    UBRELVY 100 mg tablet, TAKE 1 TABLET BY MOUTH 1 TIME AS DIRECTED. REPEAT IN 2 HOURS AS NEEDED, Disp: , Rfl:     baclofen (LIORESAL) 10 MG tablet, Take 0.5-1 tablets (5-10 mg total) by mouth daily as needed (pain or spasm)., Disp: 30 tablet, Rfl: 2    DULoxetine (CYMBALTA) 30 MG capsule, Take 1 capsule (30 mg total) by mouth once daily., Disp: 90 capsule, Rfl: 1     Review of Systems:  ROS    Allergies:  Opioids - morphine analogues, Morphine, and Tramadol     Medical History:   has a past medical history of ADHD (attention deficit hyperactivity disorder), Adult hypophosphatasia, AV block, Fibromyalgia, IBS (irritable bowel syndrome), IC (interstitial cystitis), Occipital neuralgia, and Trigeminal neuralgia.    Surgical History:   has a past surgical  "history that includes Inguinal hernia repair (Bilateral); Laparoscopic cholecystectomy; Augmentation of breast; Fulguration of endometriosis (07/05/2016); Laparoscopy-assisted vaginal hysterectomy (11/11/2019); Laparoscopy (12/08/2019); Appendectomy; Cystoscopy with hydrodistension and biopsy of bladder (12/28/2020); Removal of breast implant (04/2021); Hysterectomy; and Breast surgery (Bilateral).    Family History:  family history includes Breast cancer in her maternal cousin; Lung cancer in her paternal grandmother; No Known Problems in her father and mother.    Social History:   reports that she quit smoking about 13 years ago. Her smoking use included cigarettes. She started smoking about 23 years ago. She has a 5 pack-year smoking history. She has never been exposed to tobacco smoke. She has never used smokeless tobacco. She reports current alcohol use. She reports current drug use. Drug: Marijuana.    Physical Exam:  /74   Pulse 68   Ht 5' 4" (1.626 m)   Wt 53.1 kg (117 lb)   BMI 20.08 kg/m²   GEN: No acute distress. Calm, comfortable  HENT: Normocephalic, atraumatic, moist mucous membranes  EYE: Anicteric sclera, non-injected.   CV: Non-diaphoretic. Regular Rate. Radial Pulses 2+.  RESP: Breathing comfortably. Chest expansion symmetric.  EXT: No clubbing, cyanosis.   SKIN: Warm, & dry to palpation. No visible rashes or lesions of exposed skin.   PSYCH: Pleasant mood and appropriate affect. Recent and remote memory intact.   GAIT: Independent, normal ambulation  Neck Exam:       Inspection: No erythema, bruising.       Palpation: (+) TTP of left cervical paraspinals and over SHANELLE and DANY      ROM: No significant Limitation in flexion, extension, lateral bending or rotation      Provocative Maneuvers:  (-) Spurling's bilaterally  Lumbar Spine Exam:       Inspection: No bruising, swelling. Slight erythema noted at bilateral injection sites.        Palpation: No calor to the area. (+) TTP of b/l SIJ > " lumbar paraspinals b/l. (+) TTP of sacrum and coccyx         ROM:  Limited in flexion, extension, lateral bending.       (+) Facet loading bilaterally      (-) Straight Leg Raise bilaterally      (+) ISABELLA bilaterally      (+) SIJ Compression Test bilaterally      (+) Gaenslan's Test bilaterally      (+) Thigh thrust/Posterior Pelvic Pain Provocation Test bilaterally      (+) Sacral thrust  Hip/Pelvis Exam:      Inspection: No gross deformity or apparent leg length discrepancy      Palpation:  (+) TTP right ischial bursa and b/l piriformis and gluteal muscles, (-) TTP to bilateral greater trochanteric bursas.       ROM:  No limitation or pain in internal rotation, external rotation b/l  Neurologic Exam:     Alert. Speech is fluent and appropriate.     Strength: 4/5 in left AbDM, wrist extension, 4/5 diffusely in the left leg, otherwise 5/5 throughout bilateral upper and lower extremities     Sensation:  Grossly intact to light touch in bilateral upper and lower extremities     Reflexes: 2+ in b/l BR, biceps, triceps, patella, achilles     Tone: No abnormality appreciated in bilateral lower extremities     No Clonus     Downgoing toes on plantar stimulation     (-) Franco bilaterally           Imaging:  - MRI Lumbar Spine 4/25/25:  Alignment is within normal limits. Intervertebral discs are maintained. Bone marrow signal is unremarkable. There is no abnormal contrast enhancement. The spinal cord terminates at L2. Paraspinal soft tissues are without acute abnormality.     L1-L2: Unremarkable.     L2-L3: Unremarkable.     L3-L4: Mild bilateral facet arthropathy.     L4-L5: Severe bilateral facet arthropathy.     L5-S1: Moderate bilateral facet arthropathy.     - MRI Plevis w & w/o contrast 4/25/25:  Bone marrow signal is normal. The coccyx is intact. No evidence of displaced fracture.     The sacroiliac joints are intact. No evidence of sacroiliitis, sacroiliac osteoarthritis, or sacroiliac joint effusion. No evidence  of pathologic enhancement about the sacroiliac joints.     Mild bilateral hamstring origin tendinosis. The femoral heads are well formed and seated within well formed acetabula. There is no muscle atrophy or edema.       - MRI C-spine 11/21/23:      - X-Ray Cervical Spine AP & Lateral 10/13/23  There is some reversal of the normal cervical lordosis.  Vertebral body heights are within normal limits.  No definite spondylolisthesis demonstrated.  Mild-to-moderate disc height loss noted at C5-6.  Mild disc height loss at C4-5. Posterior elements appear intact without acute fractures or subluxations demonstrated.  Odontoid process appears intact.  Atlantoaxial articulations appear normal.  Prevertebral soft tissues are within normal limits.  Postoperative changes are noted involving the skull on the left in the suboccipital region region.     Impression:     Degenerative findings as above    - MRI L-spine 6/13/23:  Vertebral body alignment is preserved. Vertebral body heights are maintained. Bone marrow signal is within normal limits. The conus medullaris terminates normally at the level of L2. Lumbar nerve roots have normal appearance. Preserved intervertebral disc space height and signal.    T12-L1: No significant canal or foraminal stenosis.    L1-L2: No significant canal or foraminal stenosis.    L2-L3: No significant canal or foraminal stenosis.    L3-L4: No significant canal or foraminal stenosis.    L4-L5: Broad-based disc bulge, ligamentum flavum thickening, facet hypertrophy with at most mild canal stenosis. Mild effacement of the lateral recesses. No significant foraminal stenosis.    L5-S1: No significant canal or foraminal stenosis.        - MRI Pelvis w.o 6/13/23:  The femoral heads are well formed and seated within well formed acetabula. Bone marrow signal is normal. The sacroiliac joints are intact. There is no muscle atrophy or edema. There is no intrapelvic visceral abnormalities. Prior hysterectomy.  There is no evidence of fluid collection. There is mild right hamstring origin tendinosis. The gluteus medius and minimus tendon insertions are intact. The hip adductors are intact. There is no evidence of iliopsoas or trochanteric bursitis.    Impression:    Mild right hamstring origin tendinosis. Otherwise, negative pelvic MRI.      - X-ray lumbar spine 6/8/23:  Transitional anatomy with sacralization of L5.  Vertebral body heights maintained.  No spondylolisthesis.  Disc spaces maintained.  Mild facet arthropathy.  No acute osseous abnormality.  Bilateral SI joint degenerative changes.  Constipation suspected.    - X-ray pelvis 6/8/23:  Hip joint spaces maintained.  Likely transitional anatomy with L5 sacralization.  Symmetric degenerative findings of the bilateral SI joints.  Constipation findings noted.    - Pelvis CT w/o contrast 1/6/23:  The visualized gastrointestinal tract appears normal.  Urinary bladder is normal.  Inguinal regions are normal.  No masses, fluid collections or adenopathy seen.  1. There is a cystic area on the left side of the pelvis measuring about 5.8 cm. This has a simple cystic appearance to it. Statistically this most likely represents a ovarian cyst. The patient's uterus has apparently beenremoved.  The bony elements appear intact.  Impression:  1. Cyst on the left side of the pelvis. Statistically most likely represents an ovarian cyst. Evaluation with pelvic ultrasound is recommended.    - Pelvic US 12/21/22:  Transabdominal and transvaginal pelvic ultrasound was performed by the sonographer. Static images are submitted. Uterus is not visualized consistent with patient's history of previous partial hysterectomy. Right  ovary is not visualized. Patient provides a history of previous right oophorectomy. Left ovary measures 3.4 x 3.9 x 2.9 cm in size. Multiple left ovarian follicles are visualized. Small left ovarian cyst is visualized measuring 1.8 cm in diameter. Left ovarian blood  flow is present. Trace amount of pelvic free fluid is present adjacent to the vaginal cuff.  IMPRESSION:  1. Nonvisualization of the uterus and right ovary consistent with patient's surgical history.  2. Small 1.8 cm left ovarian cyst.  3. Trace amount of pelvic free fluid.    - MRI C-spine 9/26/22:  Reversal of the normal cervical lordotic curvature. The vertebral body heights and alignment are maintained throughout the cervical spine. No abnormal vertebral body marrow signal. Multilevel intervertebral disc desiccation.  Spinal cord is normal in caliber and demonstrates normal signal. The visualized portions of the cervicomedullary junction are unremarkable.  C2-C3: Normal intervertebral disc contour. No central canal or neural foraminal narrowing.  C3-C4: Intervertebral disc bulge narrows the ventral subarachnoid space. No central canal or neural foraminal narrowing.  C4-C5: Intervertebral disc bulge eccentric to the right side. There is narrowing of the ventral subarachnoid space. The central canal is borderline measuring 10 mm. Moderate right and mild left neural foraminal narrowing.   C5-C6: Intervertebral disc bulge with superimposed left paracentral disc protrusion. There is narrowing of the ventral subarachnoid space and left subarticular recess. Bilateral uncovertebral and facet hypertrophy. Moderate bilateral neural foraminal narrowing. The central canal is narrowed to 8 mm.  C6-C7: Intervertebral disc bulge narrows the ventral subarachnoid space. The central canal is narrowed to 9 mm. No neural foraminal narrowing.  C7-T1: Normal intervertebral disc contour. No central canal or neural foraminal narrowing.  The paravertebral soft tissues are unremarkable.  IMPRESSION:  Multilevel cervical spondylosis most prominent at C4-7. The findings are not significantly changed when compared to the prior exam.    - MRI Lumbar spine 8/17/21:  Alignment: Normal.  Vertebral bodies: Normal height and marrow  signal.  T12-L1: No significant spinal canal stenosis or neuroforaminal narrowing.  L1-L2: No significant spinal canal stenosis or neuroforaminal narrowing.  L2-L3: No significant spinal canal stenosis or neuroforaminal narrowing.  L3-L4: No significant spinal canal stenosis or neuroforaminal narrowing.  L4-L5: No significant spinal canal stenosis or neuroforaminal narrowing.  L5-S1: No significant spinal canal stenosis or neuroforaminal narrowing.  Spinal cord: The cord extends down to the L1 level. The cord has a normal size, configuration and signal pattern.    - MRI Thoracic spine w/o 6/15/20:  FINDINGS:  Vertebral bodies: Normal vertebral body height, alignment and marrow signal.  C7-T1: The disc is normal. The central canal and neural foramen appear normal. No displacement or compression of the neural elements are seen.  T1-T2: The disc has a normal contour. The central canal and neural foramen appear normal. No displacement or compression of the neural elements are seen.  T2-T3: The disc has a normal contour. The central canal and neural foramen appear normal. No displacement or compression of the neural elements are seen.  T3-T4: The disc has a normal contour. The central canal and neural foramen appear normal. No displacement or compression of the neural elements are seen.  T4-T5: The disc has a normal contour. The central canal and neural foramen appear normal. No displacement or compression of the neural elements are seen.  T5-T6: The disc has a normal contour. The central canal and neural foramen appear normal. No displacement or compression of the neural elements are seen.  T6-T7: The disc has a normal contour. The central canal and neural foramen appear normal. No displacement or compression of the neural elements are seen.  T7-T8: The disc has a normal contour. The central canal and neural foramen appear normal. No displacement or compression of the neural elements are seen.  T8-T9: The disc has a  normal contour. The central canal and neural foramen appear normal. No displacement or compression of the neural elements are seen.  T9-T10: The disc has a normal contour. The central canal and neural foramen appear normal. No displacement or compression of the neural elements are seen.  T10-T11: The disc has a normal contour. The central canal and neural foramen appear normal. No displacement or compression of the neural elements are seen.  T11-T12: The disc has a normal contour. The central canal and neural foramen appear normal. No displacement or compression of the neural elements are seen.  T12-L1: The disc has a normal contour. The central canal and neural foramen appear normal. No displacement or compression of the neural elements are seen.  Spinal cord: The cord has a normal size, configuration and signal pattern.  Additional findings: None seen.      Labs:  BMP  Lab Results   Component Value Date     2025    K 3.7 2025     2025    CO2 29 2025    BUN 14.7 2025    CREATININE 0.69 2025    CALCIUM 9.4 2025    ANIONGAP 9.0 2025     Lab Results   Component Value Date    AST 19 2025     Lab Results   Component Value Date     2023       Assessment:  Margareth Echeverria is a 45 y.o. female with the following diagnoses based on history, exam, and imagin. Lumbar spondylosis    2. Chronic bilateral low back pain without sciatica    3. Chronic pain syndrome  -     Pain Clinic Drug Screen; Future; Expected date: 2025    4. Long term (current) use of opiate analgesic  -     Pain Clinic Drug Screen; Future; Expected date: 2025              This is a pleasant 45 y.o. lady presenting with:     - Chronic pelvic pain: Pain appears multifactorial and she has a complex history with prior hysterectomy for endometriosis, interstitial cystitis, b/l oophorectomy for ovarian cyst, prior bilateral inguinal hernia repair and hypophosphatasia.     - Coccydynia currently primary painful area and is what is limiting her function the most.    - Improved   - Neck pain and cervicogenic HA and left radicular arm pain. Appears to have occipital neuralgia on left which improved after occipital nerve blocks. She also has some myofascial pain on exam.    - Left radicular arm pain improved w/ OLEGARIO    - Patient had > 50% relief relief for > 3 months with noted improved function measured on PEG scale with prior ALEKSANDR.     - Patient with moderate to severe chronic neck pain that is predominantly axial with radicular pain that resolved with ALEKSANDR, but axial pain persists.. The pain is severe enough to greatly impact quality of life &/or function as evidenced on the patients PEG score and NRS.    - Pain persists for greater than 3 months despite conservative treatment, including: Activity modification (avoiding exacerbating factors), physical therapy, and oral medications, and HEP.    - Facet joints currently suspected as primary pain generator based on clinical assessment & radiology studies, as there is no fracture, tumor, infection, and significant deformity. There is NO surgical fusion (such as Anterior Lumbar Interbody Fusion) at the spinal segment to be targeted..   - This facet level has not undergone ablation in the previous 2 years.    - Patient reports greater than or equal to 80% relief from previous MBB at this segment with preprocedure pain score noted as 10/10 and post-procedure pain score noted as 2/10 with consistent relief for duration of local anesthetic (3-4 hrs).   - Chronic bilateral low back pain: Pain appears primarily due to SIJ dysfunction on exam, but may have facet and myofascial contributions as well.    - Back pain improved after bilateral SIJ CSI initially, but not after subsequent injection.   - Patient with moderate to severe chronic low back pain that is predominantly axial with no radicular component or neurogenic claudication.. The pain is  severe enough to greatly impact quality of life &/or function as evidenced on the patients functional/disability score and NRS.    - Pain persists for greater than 3 months despite conservative treatment, including: Activity modification (avoiding exacerbating factors), physical therapy, physician-led home exercise program, and oral medications.    - Facet joints currently suspected as primary pain generator based on clinical assessment & radiology studies, as there is no fracture, tumor, infection, and significant deformity. There is NO surgical fusion (such as Anterior Lumbar Interbody Fusion) at the spinal segment to be targeted..   - This facet level has not undergone ablation in the previous 2 years.   - History of fibromyalgia.   - History of familial hypophosphatasia and h/o polyarthralgia   - Comorbidities: Depression. ADHD. Hypophosphatasia. H/o Trigeminal neuralgia. IBS w/ constipation. H/o AV block. Former smoker. Allergy to opioids.     Treatment Plan:   - PT/OT/HEP: Refer to PT, pending   - Cont HEP for neck and back pain.    - Discussed benefits of exercise for pain.   - Procedures: Schedule diagnostic medial branch block of bilateral L4/5 & L5/S1 facet joints under fluoroscopic guidance with local sedation. (CPT Codes 20183, 65759, KX modifier & 50 modifier). If MBB successful x 2, plan for RFA. The patient is not allergic to iodinated contrast. Patient is not currently taking blood thinners for cardiovascular prophylaxis  - Can repeat ganglion impar block & sacrococcygeal joint corticosteroid injection PRN  - If neck pain worsens, can schedule diagnostic medial branch block (#2/2) of the bilateral C3-4 & C4-5 facet joints under fluoroscopic guidance with local sedation. (CPT Codes 25118, 92696, KX modifier & 50 modifer). If MBB successful x 2, plan for RFA. The patient is not allergic to iodinated contrast. Patient is not currently taking blood thinners for CV prophylaxis.  - Can repeat C7-T1 IL ALEKSANDR  PRN if arm pain worsens  - Consider b/l superior hypogastric plexus block for chronic pelvic pain  - Medications:   - Cont Lyrica 50mg in AM/ 75mg qHS.  reviewed.    - Cont Norco 5/325 mg q 8 hours PRN pain (#90 per month).     - Will attempt to take 2.5 tablets/day this month with plan to decrease to this dose at next fill.  - Cont baclofen 5-10 mg daily PRN. Try to switch one hydrocodone pill for a baclofen tablet   - Cont tizanidine to 4 mg in AM, 4 mg mid-day and 8 mg qHS.    - Cont cymbalta 30 mg daily (didn't tolerate 60 mg)   - Pain contract signed 08/16/2023    -  reviewed  - Imaging: Reviewed.   - Labs: Reviewed.  UDS ordered today.      Follow Up: RTC ASAP after MBB or sooner PRN    I spent a total of 35 minutes on the day of the visit.This includes face to face time and non-face to face time preparing to see the patient (eg, review of tests), obtaining and/or reviewing separately obtained history, documenting clinical information in the electronic or other health record, independently interpreting results and communicating results to the patient/family/caregiver, or care coordinator.        Ashli Cates PA-C  Interventional Pain Medicine

## 2025-08-08 NOTE — PATIENT INSTRUCTIONS
Pre-Procedural Instructions  Interventional Pain  Medial Branch Block    Purpose:  We are performing diagnostic blocks of particular nerves in order to determine if performing a radiofrequency ablation (burning) of those nerves will provide relief of your pain.   The relief is temporary and used to determine the next step in your treatment  Please provide honest results in the amount of pain relief you obtained. Don't worry, you will not hurt our feelings.  We want to help relieve your pain and can potentially target different structures in order to provide you better relief.   Informed Consent:  These procedures are safe, but like any interventional procedure, it does carry rare risks which include:  Infection   Bleeding  Allergic Reactions  No relief of pain  Temporary sense of imbalance/dizziness can occur when performing cervical medial branch blocks, especially of the third occipital nerve.     Preparing for the procedure:    Tell your healthcare provider about all medicines you take. This includes over-the-counter medicines, herbs, vitamins, and other supplements.  Tell your healthcare provider about any other medical or dental procedures planned in proximity to your procedure.   Reduce Infection Risk:   Shower or bathe the night before and morning of your scheduled procedure.  Notify clinic if you are:  Having signs of illness, such as fevers, or signs of a urinary tract infection (UTI)  Prescribed antibiotics for an infection  Reduce bleeding risk:  For 7 days prior to procedure and during the duration of the trial (until your clinic follow-up):  Stop NSAIDs (ibuprofen/Advil, naproxen/Aleve, Meloxicam/Mobic, Goody's, or others)  Stop Demetria [place], Pepto Bismol, & Herbal Medication/Supplements (such as Ginko, high dose Vitamin E, Fish Oil, Turmeric, Garlic, and others)  Home Support:  You MUST have a responsible  to bring you home from the facility and assist you at home on the day of the procedure    Plan to not be at work on the day of the procedure.   Medications:  Blood thinners: Such as: Aspirin, Plavix, Warfarin (Coumadin), Eliquis, Pradaxa, Xarelto, & others  If you are taking blood thinning medications, the clinic will notify you if you need to hold and of the number days to hold the medication prior to the procedure and when to restart these after the procedure. This will be communicated with and approved by your prescribing physician.   Please notify the office if you are taking blood thinning medications and are unsure if or when you need to hold the medication.   GLP-1 agonists: Semaglutide (Ozempic, Wegovy, Rybelsus), Tirzepatide (Mounjaro, Zepbound), Dulaglutide (Trulicity), Liraglutide, Lixisenatide, Exenatide  These medications can increase the risk of regurgitation and pulmonary aspiration of gastric contents during general anesthesia and deep sedation  If you take this weekly, please hold for 1 week prior to the procedure  If you take this daily, please hold on the day of procedure  If these are utilized for blood sugar control, you will need to discuss with your managing provider on what to utilize for bridging the antidiabetic therapy to maintain blood sugar control and avoiding hyperglycemia  Please take other regular medications as scheduled.  Diet:  If you will be receiving sedation for the procedure.  Do not eat anything for 8 hours prior to your procedure.   You may drink clear liquids up to 4 hours prior to your procedure.   Clear liquids include: water, black coffee, fruit juice without pulp, clear tea  You may drink water up to 2 hours prior to your procedure.  You may use a sip of water to take your regular medications  If you are NOT receiving sedation for the procedure:  Do not eat anything for 2 hours prior to your procedure. You may eat a light meal more than 2 hours prior to your procedure.   You may use a sip of water to take your regular medications  For Diabetic  Patients:  Please discuss with your managing physician the best way to take your medications on the procedure day to minimize large increases or decreases in your blood sugar  Please notify clinic of your most recent A1c and when that was performed. Optimizing your blood sugar control prior to the procedure will help decrease your risk of complications, such as infection.   Notify clinic and we will attempt to schedule your procedure as early in the morning as possible to minimize hypoglycemia that may occur while fasting for the procedure.  Please inform clinic if: Dr. Katharine Steve's clinic phone number is [phone]  You are pregnant or attempting to become pregnant  You have an implanted electronic device (pacemaker, defibrillator, medication pump, stimulator, etc.)  The electrical current utilized to coagulate the nerves can damage, disrupt or potentially cause a discharge of the electronic device.  You are having signs of infection noted above or are started on antibiotics.  You are allergic to iodinated contrast agents, local anesthetics, or steroids.  You are having other medical procedures around the time of your procedure (within 2 weeks)    Instructions for procedure day:    If you are not having any pain in the area that is going to be evaluated with the procedure, please call and cancel the procedure. The block will not provide valuable information if you are not having any pain.   We ask that you please leave your valuables at home  Avoid moisturizers or scented personal products  Wear loose fitting clothing that you can easily change in & out of  Plan to not be at work on the day of the procedure.   Please remove jewelry.  If you are having a procedure done on your head or neck, please remove any metallic items or hardware, such as dental hardware, jewelry that may obscure the images of the area during the procedure.     After the procedure: You will be sent to a recovery room after the procedure.  You will go home the same day.     Home Care Instructions after the procedure:    Injection site(s)  The injection sites may be covered with a dressing.  You may remove bandage at discharge from the hospital.   Bruising may be present  Avoid soaking or bathing in hot tub, bath or pool on the day of your procedure.   Showering is okay the day of procedure.   Avoid heat to the area  Notify the clinic if you are experiencing increased bleeding or drainage from the injection site(s)  Pain  Mild-moderate pain/discomfort may occur at the injection sites  Pain may be relieved with:  Ice  Tylenol (Acetaminophen)  Injection site pain typically improves after a day  Your baseline pain may improve immediately after the procedure:  Assess and note in your diary on a scale of 0 - 10/10, the intensity of pain at multiple time intervals on the day of your procedure.  Activity/Diet:  Day of the procedure:  Do NOT drive or operate heavy machinery  Resume daily life activities as tolerated. Do this slowly and carefully.  Bed rest is NOT recommended  Avoid strenuous activity, heavy lifting, bending or twisting.   Åvoid activity that requires skill, dexterity or coordination  As pain improves, you can carefully attempt activities that would exacerbate your pain and assess any functional benefits from the procedure.   If you received sedation - For 24 hrs following the procedure DO NOT:   Drive or operate heavy machinery  Drink alcohol  Make any important decisions  Dizziness, vertigo or balance difficulties may occur when having the procedure on the cervical spine (the neck). This happens because the procedure anesthetizes the proprioceptors (signals informing where you are in space)  This can be worsened when looking down or looking sideways  You can help compensate for this by utilizing your visual cues. Always focus on horizontal objects, such as window frames, door frames, or the horizon itself.  This typically improves within  15-30 minutes  Day after the procedure:   Resume daily life activities as tolerated: Let pain be your guide. Progress slowly and try not to over exert yourself if you are feeling good.   If possible, avoid activities that exacerbate your pain  Bed rest is NOT recommended   Physical Therapy (PT): You may restart your home exercise program the day following your procedure.  Diet: You may resume your normal diet as tolerated after the procedure  If nauseated, hold solid foods until nausea has resolved  Medications:  If you held any blood thinner medications for the procedure, you should have been given a specific timeline on when to restart the medication that has been determined in collaboration with your prescriber and our clinic. If you do not know when to restart, please notify clinic.  You may continue all other regular medications as prescribed.     When to call your healthcare provider [phone]  Call your healthcare provider if you have any of these symptoms:  Fever of 100.4°F (38.0°C) or higher. If you feel hot, take your temperature before notifying clinic.  New pain, weakness, tingling, or numbness in your legs. This may be expected in the first several hours after the procedure due to the local anesthetic used during the procedure.   New or worsening back pain   Redness, drainage or bleeding from site  Changes in bowel (incontinence) function  Changes in bladder (incontinence or retention) function  New or unusual headache &/or neck stiffness  Persistent nausea or vomiting with inability to tolerate clear liquids for more than 12 hours       When to seek medical attention  Call 911 right away if you have any of the following:  Chest pain  Shortness of breath  Signs of a stroke: Sudden changes in vision, changes in speech, sudden weakness in your face/arms/legs  Any other medical emergency     Follow-up: Post-procedure clinic appointment: ASAP after procedure to document benefit of procedure        Katharine Steve M.D.  [facility] Interventional Pain Medicine  Phone: [phone]

## 2025-08-11 DIAGNOSIS — M47.816 LUMBAR SPONDYLOSIS: Primary | ICD-10-CM

## 2025-08-14 ENCOUNTER — TELEPHONE (OUTPATIENT)
Dept: PAIN MEDICINE | Facility: CLINIC | Age: 45
End: 2025-08-14
Payer: COMMERCIAL